# Patient Record
Sex: FEMALE | Race: WHITE | Employment: OTHER | URBAN - METROPOLITAN AREA
[De-identification: names, ages, dates, MRNs, and addresses within clinical notes are randomized per-mention and may not be internally consistent; named-entity substitution may affect disease eponyms.]

---

## 2017-09-19 ENCOUNTER — TELEPHONE (OUTPATIENT)
Dept: INTERNAL MEDICINE CLINIC | Age: 62
End: 2017-09-19

## 2017-09-20 PROBLEM — M85.80 OSTEOPENIA: Status: ACTIVE | Noted: 2017-09-20

## 2017-09-20 PROBLEM — J30.9 ALLERGIC RHINITIS: Status: ACTIVE | Noted: 2017-09-20

## 2017-09-21 ENCOUNTER — OFFICE VISIT (OUTPATIENT)
Dept: INTERNAL MEDICINE CLINIC | Age: 62
End: 2017-09-21

## 2017-09-21 VITALS
HEART RATE: 82 BPM | BODY MASS INDEX: 23.82 KG/M2 | SYSTOLIC BLOOD PRESSURE: 130 MMHG | HEIGHT: 65 IN | DIASTOLIC BLOOD PRESSURE: 72 MMHG | WEIGHT: 143 LBS

## 2017-09-21 DIAGNOSIS — N39.0 URINARY TRACT INFECTION WITHOUT HEMATURIA, SITE UNSPECIFIED: Primary | ICD-10-CM

## 2017-09-21 LAB
BACTERIA UA POCT, BACTPOCT: ABNORMAL
BILIRUB UR QL STRIP: NEGATIVE
CASTS UA POCT: ABNORMAL
CLUE CELLS, CLUEPOCT: NEGATIVE
CRYSTALS UA POCT, CRYSPOCT: NEGATIVE
EPITHELIAL CELLS POCT, EPITHPOCT: ABNORMAL
GLUCOSE UR-MCNC: NEGATIVE MG/DL
KETONES P FAST UR STRIP-MCNC: NEGATIVE MG/DL
MUCUS UA POCT, MUCPOCT: ABNORMAL
PH UR STRIP: 6.5 [PH] (ref 5–7)
PROTEIN,URINE POC: ABNORMAL MG/DL
RBC UA POCT, RBCPOCT: ABNORMAL
SP GR UR STRIP: 1.01 (ref 1.01–1.02)
TRICH UA POCT, TRICHPOC: NEGATIVE
UA UROBILINOGEN AMB POC: NORMAL (ref 0.2–1)
URINALYSIS CLARITY POC: ABNORMAL
URINALYSIS COLOR POC: ABNORMAL
URINE BLOOD POC: ABNORMAL
URINE LEUKOCYTES POC: ABNORMAL
URINE NITRITES POC: POSITIVE
WBC UA POCT, WBCPOCT: ABNORMAL
YEAST UA POCT, YEASTPOC: NEGATIVE

## 2017-09-21 RX ORDER — CIPROFLOXACIN 250 MG/1
250 TABLET, FILM COATED ORAL 2 TIMES DAILY
Qty: 14 TAB | Refills: 0 | Status: SHIPPED | OUTPATIENT
Start: 2017-09-21 | End: 2017-09-28

## 2017-09-21 RX ORDER — BISMUTH SUBSALICYLATE 262 MG
1 TABLET,CHEWABLE ORAL DAILY
COMMUNITY
End: 2022-10-06

## 2017-09-21 NOTE — PATIENT INSTRUCTIONS
Urinary Tract Infection in Pregnancy: Care Instructions  Your Care Instructions    A urinary tract infection, or UTI, is an infection of the bladder and other urinary structures. Most UTIs occur in the bladder. They often cause pain or burning when you urinate. UTI is the most common bacterial infection in pregnancy. If untreated, a UTI could lead to problems such as a kidney infection or  labor. Most UTIs can be cured with antibiotics. Your doctor will prescribe an antibiotic that is safe during pregnancy. Be sure to finish your medicine so that the infection does not spread to your kidneys. Follow-up care is a key part of your treatment and safety. Be sure to make and go to all appointments, and call your doctor if you are having problems. It's also a good idea to know your test results and keep a list of the medicines you take. How can you care for yourself at home? · Take your antibiotics as directed. Do not stop taking them just because you feel better. You need to take the full course of antibiotics. · Drink extra water and other fluids for the next day or two. This will help wash out the bacteria causing the infection. If you have kidney, heart, or liver disease and have to limit fluids, talk with your doctor before you increase the amount of fluids you drink. · Do not drink alcohol. · Urinate often. Try to empty your bladder each time. Preventing UTIs  · Drink plenty of fluids, enough so that your urine is light yellow or clear like water. This helps you urinate often, which clears bacteria from your system. If you have kidney, heart, or liver disease and have to limit fluids, talk with your doctor before you increase the amount of fluids you drink. · Urinate when you first have the urge. · Urinate right after you have sex. This is the best way for women to avoid UTIs. · When going to the bathroom, wipe from front to back to keep bacteria from entering the vagina or urethra.   When should you call for help? Call your doctor now or seek immediate medical care if:  · Symptoms such as fever, chills, nausea, or vomiting get worse or appear for the first time. · You have new pain in your back just below your rib cage. This is called flank pain. · There is new blood or pus in your urine. · You have any problems with your antibiotic medicine. Watch closely for changes in your health, and be sure to contact your doctor if:  · You are not getting better after 1 day (24 hours). · You have new symptoms, such as blood in your urine. Where can you learn more? Go to http://joslyn-josy.info/. Enter M982 in the search box to learn more about \"Urinary Tract Infection in Pregnancy: Care Instructions. \"  Current as of: March 16, 2017  Content Version: 11.3  © 9432-6311 [employer]. Care instructions adapted under license by [employer] (which disclaims liability or warranty for this information). If you have questions about a medical condition or this instruction, always ask your healthcare professional. [address] any warranty or liability for your use of this information.

## 2017-09-21 NOTE — PROGRESS NOTES
Subjective: This note will not be viewable in [address] The patient presents to the office with complaints of a possible UTI. Complains of odor, frequency and urgency ongoing for 7 days. Denies hematuria, abdominal pain or flank pain. No fever, chills, nausea or vomiting. Past Medical History:   Diagnosis Date    Allergic rhinitis 9/20/2017    Osteopenia 9/20/2017     History reviewed. No pertinent surgical history. No Known Allergies  Current Outpatient Prescriptions   Medication Sig Dispense Refill    multivitamin (ONE A DAY) tablet Take 1 Tab by mouth daily. Social History     Social History    Marital status:      Spouse name: N/A    Number of children: N/A    Years of education: N/A     Social History Main Topics    Smoking status: Never Smoker    Smokeless tobacco: Never Used    Alcohol use No    Drug use: No    Sexual activity: Not Asked     Other Topics Concern    None     Social History Narrative     Family History   Problem Relation Age of Onset    Elevated Lipids Mother     Cancer Mother      breast    Hypertension Father     Diabetes Father     Macular Degen Father     Heart Disease Father        Review of Systems:  GEN: no fever,chills or sweats  CV: no PND, orthopnea, or chest pain  Resp: no dyspnea on exertion, no cough  Abd: no nausea, vomiting or diarrhea or abdominal pain  Back: denies flank pain  : positive for dysuria, frequency and urgency  Neurological ROS: no TIA or stroke symptoms  ROS otherwise negative      Objective:     Visit Vitals    /72 (BP 1 Location: Right arm, BP Patient Position: Sitting)    Pulse 82    Ht 5' 5\" (1.651 m)    Wt 143 lb (64.9 kg)    BMI 23.8 kg/m2     Body mass index is 23.8 kg/(m^2). General:   alert, cooperative and no distress   Skin: No rash appreciated   Neck: Supple   Heart: RRR without murmur   Lungs: Clear to auscultation bilaterally   Abdomen: Soft, nontender.   Normal bowel sounds   Back: No CVAT present Neuro: No focal deficits appreciated     Physical exam otherwise negative         Assessment/Plan:     Diagnoses and all orders for this visit:    Urinary tract infection without hematuria, site unspecified  -     AMB POC URINALYSIS DIP STICK AUTO W/ MICRO   -     CULTURE, URINE        Other instructions:   Await results of urine culture    Antibiotic held pending results of urine studies    Increase po fluids    Follow-up Disposition:  Return for As previously scheduled.     Floridalma Cano MD I was present during the key portion of the procedure.

## 2017-09-21 NOTE — MR AVS SNAPSHOT
Visit Information Date & Time Provider Department Dept. Phone Encounter #  
 9/21/2017  9:20 AM Santo Trejo MD [address] [id number] Follow-up Instructions Return for As previously scheduled. Follow-up and Disposition History Upcoming Health Maintenance Date Due Hepatitis C Screening 1955 DTaP/Tdap/Td series (1 - Tdap) 9/18/1975 PAP AKA CERVICAL CYTOLOGY 9/18/1975 BREAST CANCER SCRN MAMMOGRAM 9/18/2004 FOBT Q 1 YEAR AGE 50-75 9/18/2004 ZOSTER VACCINE AGE 60> 7/18/2014 INFLUENZA AGE 9 TO ADULT 8/1/2017 Allergies as of 9/21/2017  Review Complete On: 9/21/2017 By: Santo Trejo MD  
 No Known Allergies Current Immunizations  Never Reviewed No immunizations on file. Not reviewed this visit You Were Diagnosed With   
  
 Codes Comments Urinary tract infection without hematuria, site unspecified    -  Primary ICD-10-CM: N39.0 ICD-9-CM: 599.0 Vitals BP Pulse Height(growth percentile) Weight(growth percentile) BMI Smoking Status 130/72 (BP 1 Location: Right arm, BP Patient Position: Sitting) 82 5' 5\" (1.651 m) 143 lb (64.9 kg) 23.8 kg/m2 Never Smoker BMI and BSA Data Body Mass Index Body Surface Area  
 23.8 kg/m 2 1.73 m 2 Your Updated Medication List  
  
   
This list is accurate as of: 9/21/17  9:48 AM.  Always use your most recent med list.  
  
  
  
  
 multivitamin tablet Commonly known as:  ONE A DAY Take 1 Tab by mouth daily. We Performed the Following AMB POC URINALYSIS DIP STICK AUTO W/ MICRO  [34179 CPT(R)] CULTURE, URINE [id number] CPT(R)] Follow-up Instructions Return for As previously scheduled. Patient Instructions Urinary Tract Infection in Pregnancy: Care Instructions Your Care Instructions A urinary tract infection, or UTI, is an infection of the bladder and other urinary structures. Most UTIs occur in the bladder. They often cause pain or burning when you urinate. UTI is the most common bacterial infection in pregnancy. If untreated, a UTI could lead to problems such as a kidney infection or  labor. Most UTIs can be cured with antibiotics. Your doctor will prescribe an antibiotic that is safe during pregnancy. Be sure to finish your medicine so that the infection does not spread to your kidneys. Follow-up care is a key part of your treatment and safety. Be sure to make and go to all appointments, and call your doctor if you are having problems. It's also a good idea to know your test results and keep a list of the medicines you take. How can you care for yourself at home? · Take your antibiotics as directed. Do not stop taking them just because you feel better. You need to take the full course of antibiotics. · Drink extra water and other fluids for the next day or two. This will help wash out the bacteria causing the infection. If you have kidney, heart, or liver disease and have to limit fluids, talk with your doctor before you increase the amount of fluids you drink. · Do not drink alcohol. · Urinate often. Try to empty your bladder each time. Preventing UTIs · Drink plenty of fluids, enough so that your urine is light yellow or clear like water. This helps you urinate often, which clears bacteria from your system. If you have kidney, heart, or liver disease and have to limit fluids, talk with your doctor before you increase the amount of fluids you drink. · Urinate when you first have the urge. · Urinate right after you have sex. This is the best way for women to avoid UTIs. · When going to the bathroom, wipe from front to back to keep bacteria from entering the vagina or urethra. When should you call for help? Call your doctor now or seek immediate medical care if: · Symptoms such as fever, chills, nausea, or vomiting get worse or appear for the first time. · You have new pain in your back just below your rib cage. This is called flank pain. · There is new blood or pus in your urine. · You have any problems with your antibiotic medicine. Watch closely for changes in your health, and be sure to contact your doctor if: 
· You are not getting better after 1 day (24 hours). · You have new symptoms, such as blood in your urine. Where can you learn more? Go to http://joslyn-josy.info/. Enter M982 in the search box to learn more about \"Urinary Tract Infection in Pregnancy: Care Instructions. \" Current as of: March 16, 2017 Content Version: 11.3 © 3305-6899 [employer]. Care instructions adapted under license by [employer] (which disclaims liability or warranty for this information). If you have questions about a medical condition or this instruction, always ask your healthcare professional. [address] any warranty or liability for your use of this information. Patient Instructions History Introducing [facility] & HEALTH SERVICES! Al[employer] Perry introduces [employer]oyo patient portal. Now you can access parts of your medical record, email your doctor's office, and request medication refills online. 1. In your internet browser, go to [email or web address] 2. Click on the First Time User? Click Here link in the Sign In box. You will see the New Member Sign Up page. 3. Enter your Yoyo Access Code exactly as it appears below. You will not need to use this code after youve completed the sign-up process. If you do not sign up before the expiration date, you must request a new code. · Yoyo Access Code: [email or web address] Expires: 12/20/2017  9:25 AM 
 
4. Enter the last four digits of your Social Security Number (xxxx) and Date of Birth (mm/dd/yyyy) as indicated and click Submit. You will be taken to the next sign-up page. 5. Create a Trak ID. This will be your Trak login ID and cannot be changed, so think of one that is secure and easy to remember. 6. Create a Trak password. You can change your password at any time. 7. Enter your Password Reset Question and Answer. This can be used at a later time if you forget your password. 8. Enter your e-mail address. You will receive e-mail notification when new information is available in [address] 9. Click Sign Up. You can now view and download portions of your medical record. 10. Click the Download Summary menu link to download a portable copy of your medical information. If you have questions, please visit the Frequently Asked Questions section of the Trak website. Remember, Trak is NOT to be used for urgent needs. For medical emergencies, dial 911. Now available from your iPhone and Android! Please provide this summary of care documentation to your next provider. Your primary care clinician is listed as GABE Huizar. If you have any questions after today's visit, please call [phone].

## 2017-09-21 NOTE — PROGRESS NOTES
Vinay Whaley is a 60 y.o. female presenting for Urinary Odor  . 1. Have you been to the ER, urgent care clinic since your last visit? Hospitalized since your last visit? No    2. Have you seen or consulted any other health care providers outside of the [address] [place] [address] since your last visit? Include any pap smears or colon screening. No    No flowsheet data found. Abuse Screening Questionnaire 9/21/2017   Do you ever feel afraid of your partner? N   Are you in a relationship with someone who physically or mentally threatens you? N   Is it safe for you to go home? Y       PHQ over the last two weeks 9/21/2017   Little interest or pleasure in doing things Not at all   Feeling down, depressed or hopeless Not at all   Total Score PHQ 2 0       There are no discontinued medications.

## 2017-09-23 LAB — BACTERIA UR CULT: ABNORMAL

## 2017-12-14 ENCOUNTER — OFFICE VISIT (OUTPATIENT)
Dept: INTERNAL MEDICINE CLINIC | Age: 62
End: 2017-12-14

## 2017-12-14 VITALS
OXYGEN SATURATION: 99 % | WEIGHT: 144 LBS | HEART RATE: 94 BPM | TEMPERATURE: 97.7 F | HEIGHT: 65 IN | DIASTOLIC BLOOD PRESSURE: 70 MMHG | BODY MASS INDEX: 23.99 KG/M2 | SYSTOLIC BLOOD PRESSURE: 122 MMHG

## 2017-12-14 DIAGNOSIS — J01.00 ACUTE MAXILLARY SINUSITIS, RECURRENCE NOT SPECIFIED: Primary | ICD-10-CM

## 2017-12-14 RX ORDER — MULTIVITAMIN
1 TABLET ORAL DAILY
COMMUNITY
End: 2022-10-06

## 2017-12-14 RX ORDER — ASCORBIC ACID 500 MG
1000 TABLET ORAL DAILY
COMMUNITY
End: 2022-08-18 | Stop reason: CLARIF

## 2017-12-14 RX ORDER — CEFUROXIME AXETIL 250 MG/1
250 TABLET ORAL 2 TIMES DAILY
Qty: 20 TAB | Refills: 0 | Status: SHIPPED | OUTPATIENT
Start: 2017-12-14 | End: 2018-01-15 | Stop reason: ALTCHOICE

## 2017-12-14 NOTE — PROGRESS NOTES
This note will not be viewable in [address] Carolyn Colón is a 60 y.o. female and presents with Sinus Infection  . Subjective:  Patient presents to the office today with 2 weeks worth of an upper respiratory infection with development of sinus congestion maxillary discomfort and purulent sinus drainage she has been taking Mucinex-D without relief. There is been no fevers or chills she does have some postnasal drainage but this is only caused a minor cough there is been no neck stiffness or rash. Patient Active Problem List   Diagnosis Code    Allergic rhinitis J30.9    Osteopenia M85.80     History reviewed. No pertinent surgical history. No Known Allergies  Current Outpatient Prescriptions   Medication Sig Dispense Refill    calcium-cholecalciferol, D3, (CALTRATE 600+D) tablet Take 1 Tab by mouth daily.  cranberry extract 450 mg tab tablet Take 450 mg by mouth.  ascorbic acid, vitamin C, (VITAMIN C) 500 mg tablet Take 1,000 mg by mouth daily.  cefUROXime (CEFTIN) 250 mg tablet Take 1 Tab by mouth two (2) times a day. 20 Tab 0    multivitamin (ONE A DAY) tablet Take 1 Tab by mouth daily.        Social History     Social History    Marital status:      Spouse name: N/A    Number of children: N/A    Years of education: N/A     Social History Main Topics    Smoking status: Never Smoker    Smokeless tobacco: Never Used    Alcohol use No    Drug use: No    Sexual activity: Not Asked     Other Topics Concern    None     Social History Narrative     Family History   Problem Relation Age of Onset    Elevated Lipids Mother     Cancer Mother      breast    Hypertension Father     Diabetes Father     Macular Degen Father     Heart Disease Father        Review of Systems  Constitutional: negative for fevers, chills, anorexia and weight loss  Eyes:   negative for visual disturbance and irritation  ENT:   Positive for sinus congestion, maxillary discomfort, purulent psotnasal draingage and throat irritation  Respiratory:  negative for cough, hemoptysis, dyspnea,wheezing  CV:   negative for chest pain, palpitations, lower extremity edema  GI:   negative for nausea, vomiting, diarrhea, abdominal pain,melena  Integumentary: negative for rash and pruritus  Neurological:  negative for headaches, dizziness, vertigo, memory problems and gait       Objective:  Visit Vitals    /70 (BP 1 Location: Right arm, BP Patient Position: Sitting)    Pulse 94    Temp 97.7 °F (36.5 °C)    Ht 5' 5\" (1.651 m)    Wt 144 lb (65.3 kg)    SpO2 99%    BMI 23.96 kg/m2     Body mass index is 23.96 kg/(m^2). Physical Exam:   General appearance - alert, well appearing, and in no distress  Mental status - alert, oriented to person, place, and time  EYE-KIMBER, EOMI, sclera clear. No lid swelling or purulent drainage  ENT- TM's clear without A/F level. Pharynx slightly erythematous with drainage noted  Nose - normal and patent, no erythema,  Neck - supple, no significant adenopathy   Chest - clear to auscultation, no wheezes, rales or rhonchi, symmetric air entry   Heart - normal rate, regular rhythm, normal S1, S2, no murmurs, rubs, clicks or gallops   Skin-No rash appreciated  Neuro -alert, oriented, normal speech, no focal findings. Assessment/Plan:  Diagnoses and all orders for this visit:    Acute maxillary sinusitis, recurrence not specified  -     cefUROXime (CEFTIN) 250 mg tablet; Take 1 Tab by mouth two (2) times a day., Normal, Disp-20 Tab, R-0        Other Instructions:  Mucinex as directed    Increase po fluids    Follow-up Disposition:  Return if symptoms worsen or fail to improve. I have reviewed with the patient details of the assessment and plan and all questions were answered. Relevent patient education was performed. An After Visit Summary was printed and given to the patient.     Nika Nugent MD

## 2017-12-14 NOTE — PROGRESS NOTES
Amy Andres is a 60 y.o. female presenting for Sinus Infection  . 1. Have you been to the ER, urgent care clinic since your last visit? Hospitalized since your last visit? No    2. Have you seen or consulted any other health care providers outside of the [address] [place] [address] since your last visit? Include any pap smears or colon screening. No    No flowsheet data found. Abuse Screening Questionnaire 9/21/2017   Do you ever feel afraid of your partner? N   Are you in a relationship with someone who physically or mentally threatens you? N   Is it safe for you to go home? Y       PHQ over the last two weeks 9/21/2017   Little interest or pleasure in doing things Not at all   Feeling down, depressed or hopeless Not at all   Total Score PHQ 2 0       There are no discontinued medications.

## 2017-12-14 NOTE — MR AVS SNAPSHOT
Visit Information Date & Time Provider Department Dept. Phone Encounter #  
 12/14/2017  3:10 PM Nanette Hilton MD [facility] [id number] Follow-up Instructions Return if symptoms worsen or fail to improve. Upcoming Health Maintenance Date Due Hepatitis C Screening 1955 DTaP/Tdap/Td series (1 - Tdap) 9/18/1975 PAP AKA CERVICAL CYTOLOGY 9/18/1975 FOBT Q 1 YEAR AGE 50-75 9/18/2004 ZOSTER VACCINE AGE 60> 7/18/2014 Influenza Age 5 to Adult 8/1/2017 Allergies as of 12/14/2017  Review Complete On: 12/14/2017 By: Nanette Hilton MD  
 No Known Allergies Current Immunizations  Never Reviewed No immunizations on file. Not reviewed this visit You Were Diagnosed With   
  
 Codes Comments Acute maxillary sinusitis, recurrence not specified    -  Primary ICD-10-CM: J01.00 ICD-9-CM: 461.0 Vitals BP Pulse Temp Height(growth percentile) Weight(growth percentile) SpO2  
 122/70 (BP 1 Location: Right arm, BP Patient Position: Sitting) 94 97.7 °F (36.5 °C) 5' 5\" (1.651 m) 144 lb (65.3 kg) 99% BMI Smoking Status 23.96 kg/m2 Never Smoker BMI and BSA Data Body Mass Index Body Surface Area  
 23.96 kg/m 2 1.73 m 2 Preferred Pharmacy Pharmacy Name Phone [id number] [address] [phone] Your Updated Medication List  
  
   
This list is accurate as of: 12/14/17  3:26 PM.  Always use your most recent med list.  
  
  
  
  
 calcium-cholecalciferol (D3) tablet Commonly known as:  CALTRATE 600+D Take 1 Tab by mouth daily. cefUROXime 250 mg tablet Commonly known as:  CEFTIN Take 1 Tab by mouth two (2) times a day. cranberry extract 450 mg Tab tablet Take 450 mg by mouth.  
  
 multivitamin tablet Commonly known as:  ONE A DAY Take 1 Tab by mouth daily. VITAMIN C 500 mg tablet Generic drug:  ascorbic acid (vitamin C) Take 1,000 mg by mouth daily. Prescriptions Sent to Pharmacy Refills  
 cefUROXime (CEFTIN) 250 mg tablet 0 Sig: Take 1 Tab by mouth two (2) times a day. Class: Normal  
 Pharmacy: [facility] [id number] [facility] [address]  #: [phone] Route: Oral  
  
Follow-up Instructions Return if symptoms worsen or fail to improve. Introducing [facility] & HEALTH SERVICES! [employer] introduces [employer] patient portal. Now you can access parts of your medical record, email your doctor's office, and request medication refills online. 1. In your internet browser, go to [email or web address] 2. Click on the First Time User? Click Here link in the Sign In box. You will see the New Member Sign Up page. 3. Enter your [employer] Access Code exactly as it appears below. You will not need to use this code after youve completed the sign-up process. If you do not sign up before the expiration date, you must request a new code. · [employer] Access Code: [email or web address] Expires: 12/20/2017  8:25 AM 
 
4. Enter the last four digits of your Social Security Number (xxxx) and Date of Birth (mm/dd/yyyy) as indicated and click Submit. You will be taken to the next sign-up page. 5. Create a [employer] ID. This will be your [employer] login ID and cannot be changed, so think of one that is secure and easy to remember. 6. Create a [employer] password. You can change your password at any time. 7. Enter your Password Reset Question and Answer. This can be used at a later time if you forget your password. 8. Enter your e-mail address. You will receive e-mail notification when new information is available in [address] 9. Click Sign Up. You can now view and download portions of your medical record.  
10. Click the Download Summary menu link to download a portable copy of your medical information. If you have questions, please visit the Frequently Asked Questions section of the [employer] website. Remember, [employer]uxeo is NOT to be used for urgent needs. For medical emergencies, dial 911. Now available from your iPhone and Android! Please provide this summary of care documentation to your next provider. Your primary care clinician is listed as GABE Huizar. If you have any questions after today's visit, please call [phone].

## 2018-01-15 ENCOUNTER — OFFICE VISIT (OUTPATIENT)
Dept: INTERNAL MEDICINE CLINIC | Age: 63
End: 2018-01-15

## 2018-01-15 VITALS
HEIGHT: 65 IN | WEIGHT: 147.2 LBS | DIASTOLIC BLOOD PRESSURE: 72 MMHG | SYSTOLIC BLOOD PRESSURE: 122 MMHG | BODY MASS INDEX: 24.53 KG/M2 | TEMPERATURE: 100.2 F

## 2018-01-15 DIAGNOSIS — N39.0 URINARY TRACT INFECTION WITHOUT HEMATURIA, SITE UNSPECIFIED: Primary | ICD-10-CM

## 2018-01-15 RX ORDER — LEVOFLOXACIN 250 MG/1
250 TABLET ORAL DAILY
Qty: 5 TAB | Refills: 0 | Status: SHIPPED | OUTPATIENT
Start: 2018-01-15 | End: 2018-01-20

## 2018-01-15 NOTE — PROGRESS NOTES
Heriberto Rowe is a 60 y.o. female presenting for Bladder Infection  . 1. Have you been to the ER, urgent care clinic since your last visit? Hospitalized since your last visit? Yes When: 1-12-18 [facility]: [facility] Reason for visit: UTI- given Bactrim    2. Have you seen or consulted any other health care providers outside of the [address] [place] [address] since your last visit? Include any pap smears or colon screening. No    No flowsheet data found. Abuse Screening Questionnaire 9/21/2017   Do you ever feel afraid of your partner? N   Are you in a relationship with someone who physically or mentally threatens you? N   Is it safe for you to go home? Y       PHQ over the last two weeks 9/21/2017   Little interest or pleasure in doing things Not at all   Feeling down, depressed or hopeless Not at all   Total Score PHQ 2 0       There are no discontinued medications.

## 2018-01-15 NOTE — MR AVS SNAPSHOT
Visit Information Date & Time Provider Department Dept. Phone Encounter #  
 1/15/2018  8:50 AM [facility] [id number] Follow-up Instructions Return if symptoms worsen or fail to improve. Upcoming Health Maintenance Date Due Hepatitis C Screening 1955 DTaP/Tdap/Td series (1 - Tdap) 9/18/1975 PAP AKA CERVICAL CYTOLOGY 9/18/1975 BREAST CANCER SCRN MAMMOGRAM 9/18/2004 FOBT Q 1 YEAR AGE 50-75 9/18/2004 ZOSTER VACCINE AGE 60> 7/18/2014 Influenza Age 5 to Adult 8/1/2017 Allergies as of 1/15/2018  Review Complete On: 1/15/2018 By: Ann Matos MD  
 No Known Allergies Current Immunizations  Never Reviewed No immunizations on file. Not reviewed this visit You Were Diagnosed With   
  
 Codes Comments Urinary tract infection without hematuria, site unspecified    -  Primary ICD-10-CM: N39.0 ICD-9-CM: 599.0 Vitals BP Temp Height(growth percentile) Weight(growth percentile) BMI Smoking Status 122/72 (BP 1 Location: Left arm, BP Patient Position: Sitting) 100.2 °F (37.9 °C) (Oral) 5' 5\" (1.651 m) 147 lb 3.2 oz (66.8 kg) 24.5 kg/m2 Never Smoker BMI and BSA Data Body Mass Index Body Surface Area 24.5 kg/m 2 1.75 m 2 Preferred Pharmacy Pharmacy Name Phone [id number] [address] [phone] Your Updated Medication List  
  
   
This list is accurate as of: 1/15/18  9:16 AM.  Always use your most recent med list.  
  
  
  
  
 calcium-cholecalciferol (D3) tablet Commonly known as:  CALTRATE 600+D Take 1 Tab by mouth daily. cranberry extract 450 mg Tab tablet Take 450 mg by mouth.  
  
 levoFLOXacin 250 mg tablet Commonly known as:  Ave Patient Take 1 Tab by mouth daily for 5 days. multivitamin tablet Commonly known as:  ONE A DAY  
 Take 1 Tab by mouth daily. VITAMIN C 500 mg tablet Generic drug:  ascorbic acid (vitamin C) Take 1,000 mg by mouth daily. Prescriptions Sent to Pharmacy Refills  
 levoFLOXacin (LEVAQUIN) 250 mg tablet 0 Sig: Take 1 Tab by mouth daily for 5 days. Class: Normal  
 [facility] [address]  #: [phone] Route: Oral  
  
Follow-up Instructions Return if symptoms worsen or fail to improve. Patient Instructions Urinary Tract Infection in Women: Care Instructions Your Care Instructions A urinary tract infection, or UTI, is a general term for an infection anywhere between the kidneys and the urethra (where urine comes out). Most UTIs are bladder infections. They often cause pain or burning when you urinate. UTIs are caused by bacteria and can be cured with antibiotics. Be sure to complete your treatment so that the infection goes away. Follow-up care is a key part of your treatment and safety. Be sure to make and go to all appointments, and call your doctor if you are having problems. It's also a good idea to know your test results and keep a list of the medicines you take. How can you care for yourself at home? · Take your antibiotics as directed. Do not stop taking them just because you feel better. You need to take the full course of antibiotics. · Drink extra water and other fluids for the next day or two. This may help wash out the bacteria that are causing the infection. (If you have kidney, heart, or liver disease and have to limit fluids, talk with your doctor before you increase your fluid intake.) · Avoid drinks that are carbonated or have caffeine. They can irritate the bladder. · Urinate often. Try to empty your bladder each time.  
· To relieve pain, take a hot bath or lay a heating pad set on low over your lower belly or genital area. Never go to sleep with a heating pad in place. To prevent UTIs · Drink plenty of water each day. This helps you urinate often, which clears bacteria from your system. (If you have kidney, heart, or liver disease and have to limit fluids, talk with your doctor before you increase your fluid intake.) · Urinate when you need to. · Urinate right after you have sex. · Change sanitary pads often. · Avoid douches, bubble baths, feminine hygiene sprays, and other feminine hygiene products that have deodorants. · After going to the bathroom, wipe from front to back. When should you call for help? Call your doctor now or seek immediate medical care if: 
? · Symptoms such as fever, chills, nausea, or vomiting get worse or appear for the first time. ? · You have new pain in your back just below your rib cage. This is called flank pain. ? · There is new blood or pus in your urine. ? · You have any problems with your antibiotic medicine. ? Watch closely for changes in your health, and be sure to contact your doctor if: 
? · You are not getting better after taking an antibiotic for 2 days. ? · Your symptoms go away but then come back. Where can you learn more? Go to http://joslyn-josy.info/. Enter J261 in the search box to learn more about \"Urinary Tract Infection in Women: Care Instructions. \" Current as of: May 12, 2017 Content Version: 11.4 © 7914-8433 [employer]. Care instructions adapted under license by [employer] (which disclaims liability or warranty for this information). If you have questions about a medical condition or this instruction, always ask your healthcare professional. [place] [address] any warranty or liability for your use of this information. Introducing [facility]!    
 [address] introduces [employer] patient portal. Now you can access parts of your medical record, email your doctor's office, and request medication refills online. 1. In your internet browser, go to [email or web address] 2. Click on the First Time User? Click Here link in the Sign In box. You will see the New Member Sign Up page. 3. Enter your [employer] Access Code exactly as it appears below. You will not need to use this code after youve completed the sign-up process. If you do not sign up before the expiration date, you must request a new code. · [employer] Access Code: [email or web address] Expires: 4/15/2018  8:51 AM 
 
4. Enter the last four digits of your Social Security Number (xxxx) and Date of Birth (mm/dd/yyyy) as indicated and click Submit. You will be taken to the next sign-up page. 5. Create a [employer] ID. This will be your [employer] login ID and cannot be changed, so think of one that is secure and easy to remember. 6. Create a [employer] password. You can change your password at any time. 7. Enter your Password Reset Question and Answer. This can be used at a later time if you forget your password. 8. Enter your e-mail address. You will receive e-mail notification when new information is available in [address] 9. Click Sign Up. You can now view and download portions of your medical record. 10. Click the Download Summary menu link to download a portable copy of your medical information. If you have questions, please visit the Frequently Asked Questions section of the [employer] website. Remember, [employer] is NOT to be used for urgent needs. For medical emergencies, dial 911. Now available from your iPhone and Android! Please provide this summary of care documentation to your next provider. Your primary care clinician is listed as GABE Huizar. If you have any questions after today's visit, please call [phone].

## 2018-01-15 NOTE — PATIENT INSTRUCTIONS

## 2018-05-22 ENCOUNTER — OFFICE VISIT (OUTPATIENT)
Dept: INTERNAL MEDICINE CLINIC | Age: 63
End: 2018-05-22

## 2018-05-22 VITALS
HEIGHT: 65 IN | DIASTOLIC BLOOD PRESSURE: 78 MMHG | SYSTOLIC BLOOD PRESSURE: 132 MMHG | TEMPERATURE: 99.1 F | WEIGHT: 149 LBS | HEART RATE: 104 BPM | OXYGEN SATURATION: 95 % | BODY MASS INDEX: 24.83 KG/M2

## 2018-05-22 DIAGNOSIS — J01.00 ACUTE MAXILLARY SINUSITIS, RECURRENCE NOT SPECIFIED: Primary | ICD-10-CM

## 2018-05-22 RX ORDER — CEFUROXIME AXETIL 250 MG/1
250 TABLET ORAL 2 TIMES DAILY
Qty: 20 TAB | Refills: 0 | Status: SHIPPED | OUTPATIENT
Start: 2018-05-22 | End: 2019-02-27 | Stop reason: ALTCHOICE

## 2018-05-22 NOTE — PATIENT INSTRUCTIONS
Sinusitis: Care Instructions  Your Care Instructions    Sinusitis is an infection of the lining of the sinus cavities in your head. Sinusitis often follows a cold. It causes pain and pressure in your head and face. In most cases, sinusitis gets better on its own in 1 to 2 weeks. But some mild symptoms may last for several weeks. Sometimes antibiotics are needed. Follow-up care is a key part of your treatment and safety. Be sure to make and go to all appointments, and call your doctor if you are having problems. It's also a good idea to know your test results and keep a list of the medicines you take. How can you care for yourself at home? · Take an over-the-counter pain medicine, such as acetaminophen (Tylenol), ibuprofen (Advil, Motrin), or naproxen (Aleve). Read and follow all instructions on the label. · If the doctor prescribed antibiotics, take them as directed. Do not stop taking them just because you feel better. You need to take the full course of antibiotics. · Be careful when taking over-the-counter cold or flu medicines and Tylenol at the same time. Many of these medicines have acetaminophen, which is Tylenol. Read the labels to make sure that you are not taking more than the recommended dose. Too much acetaminophen (Tylenol) can be harmful. · Breathe warm, moist air from a steamy shower, a hot bath, or a sink filled with hot water. Avoid cold, dry air. Using a humidifier in your home may help. Follow the directions for cleaning the machine. · Use saline (saltwater) nasal washes to help keep your nasal passages open and wash out mucus and bacteria. You can buy saline nose drops at a grocery store or drugstore. Or you can make your own at home by adding 1 teaspoon of salt and 1 teaspoon of baking soda to 2 cups of distilled water. If you make your own, fill a bulb syringe with the solution, insert the tip into your nostril, and squeeze gently. Gera Clonts your nose.   · Put a hot, wet towel or a warm gel pack on your face 3 or 4 times a day for 5 to 10 minutes each time. · Try a decongestant nasal spray like oxymetazoline (Afrin). Do not use it for more than 3 days in a row. Using it for more than 3 days can make your congestion worse. When should you call for help? Call your doctor now or seek immediate medical care if:  ? · You have new or worse swelling or redness in your face or around your eyes. ? · You have a new or higher fever. ? Watch closely for changes in your health, and be sure to contact your doctor if:  ? · You have new or worse facial pain. ? · The mucus from your nose becomes thicker (like pus) or has new blood in it. ? · You are not getting better as expected. Where can you learn more? Go to http://joslyn-josy.info/. Enter B787 in the search box to learn more about \"Sinusitis: Care Instructions. \"  Current as of: May 12, 2017  Content Version: 11.4  © 7518-4328 [facility]. Care instructions adapted under license by [employer] (which disclaims liability or warranty for this information). If you have questions about a medical condition or this instruction, always ask your healthcare professional. [address] any warranty or liability for your use of this information.

## 2018-05-22 NOTE — PROGRESS NOTES
Ellie Heck is a 60 y.o. female presenting for Cold Symptoms  . 1. Have you been to the ER, urgent care clinic since your last visit? Hospitalized since your last visit? No    2. Have you seen or consulted any other health care providers outside of the [address] [place] [address] since your last visit? Include any pap smears or colon screening. No    No flowsheet data found. Abuse Screening Questionnaire 9/21/2017   Do you ever feel afraid of your partner? N   Are you in a relationship with someone who physically or mentally threatens you? N   Is it safe for you to go home? Y       PHQ over the last two weeks 5/22/2018   Little interest or pleasure in doing things Not at all   Feeling down, depressed or hopeless Not at all   Total Score PHQ 2 0       There are no discontinued medications.

## 2018-05-22 NOTE — PROGRESS NOTES
This note will not be viewable in [address] Erasmo Angel is a 60 y.o. female and presents with Cold Symptoms  . Subjective:  Mrs. Anali Rosales presents to the office today with complaints of an upper respiratory infection which began approximately a month ago with sinus congestion drainage without cough. Over the last 2 weeks she is started to develop some facial pressure but denies any severe headaches. She has been running low-grade fevers and having postnasal drainage with mild  irritation. She has had no significant cough. She is tried over-the-counter medication without relief she denies neck stiffness or rash. Patient Active Problem List   Diagnosis Code    Allergic rhinitis J30.9    Osteopenia M85.80     History reviewed. No pertinent surgical history. No Known Allergies  Current Outpatient Prescriptions   Medication Sig Dispense Refill    cefUROXime (CEFTIN) 250 mg tablet Take 1 Tab by mouth two (2) times a day. 20 Tab 0    calcium-cholecalciferol, D3, (CALTRATE 600+D) tablet Take 1 Tab by mouth daily.  cranberry extract 450 mg tab tablet Take 450 mg by mouth.  ascorbic acid, vitamin C, (VITAMIN C) 500 mg tablet Take 1,000 mg by mouth daily.  multivitamin (ONE A DAY) tablet Take 1 Tab by mouth daily.        Social History     Social History    Marital status:      Spouse name: N/A    Number of children: N/A    Years of education: N/A     Social History Main Topics    Smoking status: Never Smoker    Smokeless tobacco: Never Used    Alcohol use No    Drug use: No    Sexual activity: Not Asked     Other Topics Concern    None     Social History Narrative     Family History   Problem Relation Age of Onset    Elevated Lipids Mother     Cancer Mother      breast    Hypertension Father     Diabetes Father     Macular Degen Father     Heart Disease Father        Review of Systems  Constitutional: negative for fevers, chills, anorexia and weight loss  Eyes: negative for visual disturbance and irritation  ENT:   Positive for sinus congestion, maxillary discomfort, purulent psotnasal draingage and throat irritation  Respiratory:  negative for cough, hemoptysis, dyspnea,wheezing  CV:   negative for chest pain, palpitations, lower extremity edema  GI:   negative for nausea, vomiting, diarrhea, abdominal pain,melena  Integumentary: negative for rash and pruritus  Neurological:  negative for headaches, dizziness, vertigo, memory problems and gait       Objective:  Visit Vitals    /78 (BP 1 Location: Right arm, BP Patient Position: Sitting)    Pulse (!) 104    Temp 99.1 °F (37.3 °C)    Ht 5' 5\" (1.651 m)    Wt 149 lb (67.6 kg)    SpO2 95%    BMI 24.79 kg/m2     Body mass index is 24.79 kg/(m^2). Physical Exam:   General appearance - alert, well appearing, and in no distress  Mental status - alert, oriented to person, place, and time  EYE-KIMBER, EOMI, sclera clear. No lid swelling or purulent drainage  ENT- TM's clear without A/F level. Pharynx slightly erythematous with drainage noted  Nose - normal and patent, no erythema,  Neck - supple, no significant adenopathy   Chest - clear to auscultation, no wheezes, rales or rhonchi, symmetric air entry   Heart - normal rate, regular rhythm, normal S1, S2, no murmurs, rubs, clicks or gallops   Skin-No rash appreciated  Neuro -alert, oriented, normal speech, no focal findings. Assessment/Plan:  Diagnoses and all orders for this visit:    Acute maxillary sinusitis, recurrence not specified  -     cefUROXime (CEFTIN) 250 mg tablet; Take 1 Tab by mouth two (2) times a day., Normal, Disp-20 Tab, R-0        Other Instructions:  Mucinex as directed    Increase po fluids    Follow-up Disposition:  Return if symptoms worsen or fail to improve. I have reviewed with the patient details of the assessment and plan and all questions were answered. Relevent patient education was performed.     An After Visit Summary was printed and given to the patient.     Cherrie Dancer, MD

## 2018-05-22 NOTE — MR AVS SNAPSHOT
Alexys Burgos 
 
 
 [address] [id number] [phone] Patient: Vandana Ramirez MRN: [id number] Visit Information Date & Time Provider Department Dept. Phone Encounter #  
 5/22/2018  3:40 PM Abel Salazar MD [facility] [id number] Follow-up Instructions Return if symptoms worsen or fail to improve. Upcoming Health Maintenance Date Due Hepatitis C Screening 1955 DTaP/Tdap/Td series (1 - Tdap) 9/18/1975 PAP AKA CERVICAL CYTOLOGY 9/18/1975 BREAST CANCER SCRN MAMMOGRAM 9/18/2004 FOBT Q 1 YEAR AGE 50-75 9/18/2004 ZOSTER VACCINE AGE 60> 7/18/2014 Influenza Age 5 to Adult 8/1/2018 Allergies as of 5/22/2018  Review Complete On: 5/22/2018 By: Abel Salazar MD  
 No Known Allergies Current Immunizations  Never Reviewed No immunizations on file. Not reviewed this visit You Were Diagnosed With   
  
 Codes Comments Acute maxillary sinusitis, recurrence not specified    -  Primary ICD-10-CM: J01.00 ICD-9-CM: 461.0 Vitals BP Pulse Temp Height(growth percentile) Weight(growth percentile) SpO2  
 132/78 (BP 1 Location: Right arm, BP Patient Position: Sitting) (!) 104 99.1 °F (37.3 °C) 5' 5\" (1.651 m) 149 lb (67.6 kg) 95% BMI Smoking Status 24.79 kg/m2 Never Smoker BMI and BSA Data Body Mass Index Body Surface Area 24.79 kg/m 2 1.76 m 2 Preferred Pharmacy Pharmacy Name Phone [id number], [place] [facility] [address] [phone] Your Updated Medication List  
  
   
This list is accurate as of 5/22/18  4:03 PM.  Always use your most recent med list.  
  
  
  
  
 calcium-cholecalciferol (D3) tablet Commonly known as:  CALTRATE 600+D Take 1 Tab by mouth daily. cefUROXime 250 mg tablet Commonly known as:  CEFTIN Take 1 Tab by mouth two (2) times a day. cranberry extract 450 mg Tab tablet Take 450 mg by mouth.  
  
 multivitamin tablet Commonly known as:  ONE A DAY Take 1 Tab by mouth daily. VITAMIN C 500 mg tablet Generic drug:  ascorbic acid (vitamin C) Take 1,000 mg by mouth daily. Prescriptions Sent to Pharmacy Refills  
 cefUROXime (CEFTIN) 250 mg tablet 0 Sig: Take 1 Tab by mouth two (2) times a day. Class: Normal  
 Pharmacy: [facility]/pharmacy [address] [place] [address] [address]  #: [phone] Route: Oral  
  
Follow-up Instructions Return if symptoms worsen or fail to improve. Introducing [facility]! [facility] introduces [employer] patient portal. Now you can access parts of your medical record, email your doctor's office, and request medication refills online. 1. In your internet browser, go to [email or web address] 2. Click on the First Time User? Click Here link in the Sign In box. You will see the New Member Sign Up page. 3. Enter your [employer] Access Code exactly as it appears below. You will not need to use this code after youve completed the sign-up process. If you do not sign up before the expiration date, you must request a new code. · [employer] Access Code: [email or web address] Expires: 8/20/2018  4:03 PM 
 
4. Enter the last four digits of your Social Security Number (xxxx) and Date of Birth (mm/dd/yyyy) as indicated and click Submit. You will be taken to the next sign-up page. 5. Create a [employer] ID. This will be your [employer] login ID and cannot be changed, so think of one that is secure and easy to remember. 6. Create a [employer] password. You can change your password at any time. 7. Enter your Password Reset Question and Answer. This can be used at a later time if you forget your password. 8. Enter your e-mail address. You will receive e-mail notification when new information is available in [address] 9. Click Sign Up. You can now view and download portions of your medical record. 10. Click the Download Summary menu link to download a portable copy of your medical information. If you have questions, please visit the Frequently Asked Questions section of the [employer] website. Remember, [employer] is NOT to be used for urgent needs. For medical emergencies, dial 911. Now available from your iPhone and Android! Please provide this summary of care documentation to your next provider. Your primary care clinician is listed as GABE Palomino 21. If you have any questions after today's visit, please call [phone].

## 2019-02-27 ENCOUNTER — OFFICE VISIT (OUTPATIENT)
Dept: INTERNAL MEDICINE CLINIC | Age: 64
End: 2019-02-27

## 2019-02-27 VITALS
OXYGEN SATURATION: 97 % | DIASTOLIC BLOOD PRESSURE: 76 MMHG | TEMPERATURE: 98 F | WEIGHT: 154.2 LBS | HEIGHT: 65 IN | SYSTOLIC BLOOD PRESSURE: 118 MMHG | BODY MASS INDEX: 25.69 KG/M2 | HEART RATE: 94 BPM

## 2019-02-27 DIAGNOSIS — J01.00 ACUTE MAXILLARY SINUSITIS, RECURRENCE NOT SPECIFIED: Primary | ICD-10-CM

## 2019-02-27 RX ORDER — SULFAMETHOXAZOLE AND TRIMETHOPRIM 800; 160 MG/1; MG/1
1 TABLET ORAL 2 TIMES DAILY
Qty: 20 TAB | Refills: 0 | Status: SHIPPED | OUTPATIENT
Start: 2019-02-27 | End: 2019-03-09

## 2019-02-27 NOTE — PROGRESS NOTES
Narayan Rust is a 58 y.o. female presenting for Sinus Infection  . 1. Have you been to the ER, urgent care clinic since your last visit? Hospitalized since your last visit? No    2. Have you seen or consulted any other health care providers outside of the [address] [place] [address] since your last visit? Include any pap smears or colon screening. No    No flowsheet data found. Abuse Screening Questionnaire 9/21/2017   Do you ever feel afraid of your partner? N   Are you in a relationship with someone who physically or mentally threatens you? N   Is it safe for you to go home? Y       3 most recent PHQ Screens 5/22/2018   Little interest or pleasure in doing things Not at all   Feeling down, depressed, irritable, or hopeless Not at all   Total Score PHQ 2 0       There are no discontinued medications.

## 2019-02-27 NOTE — PATIENT INSTRUCTIONS
Sinusitis: Care Instructions  Your Care Instructions    Sinusitis is an infection of the lining of the sinus cavities in your head. Sinusitis often follows a cold. It causes pain and pressure in your head and face. In most cases, sinusitis gets better on its own in 1 to 2 weeks. But some mild symptoms may last for several weeks. Sometimes antibiotics are needed. Follow-up care is a key part of your treatment and safety. Be sure to make and go to all appointments, and call your doctor if you are having problems. It's also a good idea to know your test results and keep a list of the medicines you take. How can you care for yourself at home? · Take an over-the-counter pain medicine, such as acetaminophen (Tylenol), ibuprofen (Advil, Motrin), or naproxen (Aleve). Read and follow all instructions on the label. · If the doctor prescribed antibiotics, take them as directed. Do not stop taking them just because you feel better. You need to take the full course of antibiotics. · Be careful when taking over-the-counter cold or flu medicines and Tylenol at the same time. Many of these medicines have acetaminophen, which is Tylenol. Read the labels to make sure that you are not taking more than the recommended dose. Too much acetaminophen (Tylenol) can be harmful. · Breathe warm, moist air from a steamy shower, a hot bath, or a sink filled with hot water. Avoid cold, dry air. Using a humidifier in your home may help. Follow the directions for cleaning the machine. · Use saline (saltwater) nasal washes to help keep your nasal passages open and wash out mucus and bacteria. You can buy saline nose drops at a grocery store or drugstore. Or you can make your own at home by adding 1 teaspoon of salt and 1 teaspoon of baking soda to 2 cups of distilled water. If you make your own, fill a bulb syringe with the solution, insert the tip into your nostril, and squeeze gently. Magalie Stager your nose.   · Put a hot, wet towel or a warm gel pack on your face 3 or 4 times a day for 5 to 10 minutes each time. · Try a decongestant nasal spray like oxymetazoline (Afrin). Do not use it for more than 3 days in a row. Using it for more than 3 days can make your congestion worse. When should you call for help? Call your doctor now or seek immediate medical care if:    · You have new or worse swelling or redness in your face or around your eyes.     · You have a new or higher fever.    Watch closely for changes in your health, and be sure to contact your doctor if:    · You have new or worse facial pain.     · The mucus from your nose becomes thicker (like pus) or has new blood in it.     · You are not getting better as expected. Where can you learn more? Go to http://joslyn-josy.info/. Enter M971 in the search box to learn more about \"Sinusitis: Care Instructions. \"  Current as of: March 27, 2018  Content Version: 11.9  © 4040-7138 [employer]. Care instructions adapted under license by [employer] (which disclaims liability or warranty for this information). If you have questions about a medical condition or this instruction, always ask your healthcare professional. [place] [address] any warranty or liability for your use of this information. Learning About Cutting Calories  How do calories affect your weight? Food gives your body energy. Energy from the food you eat is measured in calories. This energy keeps your heart beating, your brain active, and your muscles working. Your body needs a certain number of calories each day. After your body uses the calories it needs, it stores extra calories as fat. To lose weight safely, you have to eat fewer calories while eating in a healthy way. How many calories do you need each day? The more active you are, the more calories you need. When you are less active, you need fewer calories.  How many calories you need each day also depends on several things, including your age and whether you are male or female. Here are some general guidelines for adults:  · Less active women and older adults need 1,600 to 2,000 calories each day. · Active women and less active men need 2,000 to 2,400 calories each day. · Active men need 2,400 to 3,000 calories each day. How can you cut calories and eat healthy meals? Whole grains, vegetables and fruits, and dried beans are good lower-calorie foods. They give you lots of nutrients and fiber. And they fill you up. Sweets, energy drinks, and soda pop are high in calories. They give you few nutrients and no fiber. Try to limit soda pop, fruit juice, and energy drinks. Drink water instead. Some fats can be part of a healthy diet. But cutting back on fats from highly processed foods like fast foods and many snack foods is a good way to lower the calories in your diet. Also, use smaller amounts of fats like butter, margarine, salad dressing, and mayonnaise. Add fresh garlic, lemon, or herbs to your meals to add flavor without adding fat. Meats and dairy products can be a big source of hidden fats. Try to choose lean or low-fat versions of these products. Fat-free cookies, candies, chips, and frozen treats can still be high in sugar and calories. Some fat-free foods have more calories than regular ones. Eat fat-free treats in moderation, as you would other foods. If your favorite foods are high in fat, salt, sugar, or calories, limit how often you eat them. Eat smaller servings, or look for healthy substitutes. Fill up on fruits, vegetables, and whole grains. Eating at home  · Use meat as a side dish instead of as the main part of your meal.  · Try main dishes that use whole wheat pasta, brown rice, dried beans, or vegetables. · Find ways to cook with little or no fat, such as broiling, steaming, or grilling. · Use cooking spray instead of oil.  If you use oil, use a monounsaturated oil, such as canola or olive oil.  · Trim fat from meats before you cook them. · Drain off fat after you brown the meat or while you roast it. · Chill soups and stews after you cook them. Then skim the fat off the top after it hardens. Eating out  · Order foods that are broiled or poached rather than fried or breaded. · Cut back on the amount of butter or margarine that you use on bread. · Order sauces, gravies, and salad dressings on the side, and use only a little. · When you order pasta, choose tomato sauce rather than cream sauce. · Ask for salsa with your baked potato instead of sour cream, butter, cheese, or riggins. · Order meals in a small size instead of upgrading to a large. · Share an entree, or take part of your food home to eat as another meal.  · Share appetizers and desserts. Where can you learn more? Go to http://joslyn-josy.info/. Enter 99 [id number] in the search box to learn more about \"Learning About Cutting Calories. \"  Current as of: March 28, 2018  Content Version: 11.9  © 6812-4343 [employer]. Care instructions adapted under license by [employer] (which disclaims liability or warranty for this information). If you have questions about a medical condition or this instruction, always ask your healthcare professional. [address] any warranty or liability for your use of this information.

## 2019-02-27 NOTE — PROGRESS NOTES
This note will not be viewable in [address] Vinay Whaley is a 58 y.o. female and presents with Sinus Infection  . Subjective: This Hunsucker presents to the office today with over 10 days worth of an upper respiratory infection with the development of sinus congestion maxillary discomfort retro-orbital headaches and purulent sinus drainage. She has had a slight scratchy throat but no significant cough. She has been taking Mucinex and Sudafed along with Afrin nasal spray. She denies any neck stiffness or rash. Patient Active Problem List   Diagnosis Code    Allergic rhinitis J30.9    Osteopenia M85.80     History reviewed. No pertinent surgical history. No Known Allergies  Current Outpatient Medications   Medication Sig Dispense Refill    trimethoprim-sulfamethoxazole (BACTRIM DS, SEPTRA DS) 160-800 mg per tablet Take 1 Tab by mouth two (2) times a day for 10 days. 20 Tab 0    calcium-cholecalciferol, D3, (CALTRATE 600+D) tablet Take 1 Tab by mouth daily.  cranberry extract 450 mg tab tablet Take 450 mg by mouth.  ascorbic acid, vitamin C, (VITAMIN C) 500 mg tablet Take 1,000 mg by mouth daily.  multivitamin (ONE A DAY) tablet Take 1 Tab by mouth daily.        Social History     Socioeconomic History    Marital status:      Spouse name: Not on file    Number of children: Not on file    Years of education: Not on file    Highest education level: Not on file   Tobacco Use    Smoking status: Never Smoker    Smokeless tobacco: Never Used   Substance and Sexual Activity    Alcohol use: No    Drug use: No     Family History   Problem Relation Age of Onset    Elevated Lipids Mother     Cancer Mother         breast    Hypertension Father     Diabetes Father     Macular Degen Father     Heart Disease Father        Review of Systems  Constitutional: negative for fevers, chills, anorexia and weight loss  Eyes:   negative for visual disturbance and irritation  ENT: Positive for sinus congestion, maxillary discomfort, purulent psotnasal draingage and throat irritation  Respiratory:  negative for cough, hemoptysis, dyspnea,wheezing  CV:   negative for chest pain, palpitations, lower extremity edema  GI:   negative for nausea, vomiting, diarrhea, abdominal pain,melena  Integumentary: negative for rash and pruritus  Neurological:  negative for headaches, dizziness, vertigo, memory problems and gait       Objective:  Visit Vitals  /76 (BP 1 Location: Left arm, BP Patient Position: Sitting)   Pulse 94   Temp 98 °F (36.7 °C) (Oral)   Ht 5' 5\" (1.651 m)   Wt 154 lb 3.2 oz (69.9 kg)   SpO2 97%   BMI 25.66 kg/m²     Body mass index is 25.66 kg/m². Physical Exam:   General appearance - alert, well appearing, and in no distress  Mental status - alert, oriented to person, place, and time  EYE-KIMBER, EOMI, sclera clear. No lid swelling or purulent drainage  ENT- TM's clear without A/F level. Pharynx slightly erythematous with drainage noted  Nose - normal and patent, no erythema,  Neck - supple, no significant adenopathy   Chest - clear to auscultation, no wheezes, rales or rhonchi, symmetric air entry   Heart - normal rate, regular rhythm, normal S1, S2, no murmurs, rubs, clicks or gallops   Skin-No rash appreciated  Neuro -alert, oriented, normal speech, no focal findings. Assessment/Plan:  Diagnoses and all orders for this visit:    Acute maxillary sinusitis, recurrence not specified    Other orders  -     trimethoprim-sulfamethoxazole (BACTRIM DS, SEPTRA DS) 160-800 mg per tablet; Take 1 Tab by mouth two (2) times a day for 10 days. , Normal, Disp-20 Tab, R-0        Other Instructions:  Mucinex and Sudafed as directed    Increase po fluids    Follow-up Disposition:  Return if symptoms worsen or fail to improve. I have reviewed with the patient details of the assessment and plan and all questions were answered. Relevent patient education was performed.     An After Visit Summary was printed and given to the patient.     Amber Vazquez MD

## 2019-04-15 ENCOUNTER — OFFICE VISIT (OUTPATIENT)
Dept: INTERNAL MEDICINE CLINIC | Age: 64
End: 2019-04-15

## 2019-04-15 VITALS
HEART RATE: 84 BPM | SYSTOLIC BLOOD PRESSURE: 128 MMHG | TEMPERATURE: 98.2 F | RESPIRATION RATE: 18 BRPM | HEIGHT: 65 IN | OXYGEN SATURATION: 97 % | WEIGHT: 154 LBS | BODY MASS INDEX: 25.66 KG/M2 | DIASTOLIC BLOOD PRESSURE: 78 MMHG

## 2019-04-15 DIAGNOSIS — J01.00 ACUTE MAXILLARY SINUSITIS, RECURRENCE NOT SPECIFIED: Primary | ICD-10-CM

## 2019-04-15 RX ORDER — CEFUROXIME AXETIL 500 MG/1
500 TABLET ORAL 2 TIMES DAILY
Qty: 20 TAB | Refills: 0 | Status: SHIPPED | OUTPATIENT
Start: 2019-04-15 | End: 2019-04-25

## 2019-04-15 NOTE — PROGRESS NOTES
Jasson Palencia is a 58 y.o. female presenting for Sinus Infection  . 1. Have you been to the ER, urgent care clinic since your last visit? Hospitalized since your last visit? No    2. Have you seen or consulted any other health care providers outside of the [address] [place] [address] since your last visit? Include any pap smears or colon screening. No    No flowsheet data found. Abuse Screening Questionnaire 4/15/2019   Do you ever feel afraid of your partner? N   Are you in a relationship with someone who physically or mentally threatens you? N   Is it safe for you to go home? Y       3 most recent PHQ Screens 4/15/2019   Little interest or pleasure in doing things Not at all   Feeling down, depressed, irritable, or hopeless Not at all   Total Score PHQ 2 0       There are no discontinued medications.

## 2019-04-15 NOTE — PROGRESS NOTES
This note will not be viewable in [address] Alen Serrano is a 58 y.o. female and presents with Sinus Infection  . Subjective:  Mrs. Humble Abdi presents to the office today with 7-10 days worth of an upper respiratory infection with the development of sinus congestion, maxillary discomfort and purulent sinus drainage. The drainage has been yellow in color. She has had mild retro-orbital headaches but no facial pain or gum discomfort. She notes that the postnasal drainage is caused a slight cough. There is been no fevers or chills. She has been taking over-the-counter medication without relief. Patient Active Problem List   Diagnosis Code    Allergic rhinitis J30.9    Osteopenia M85.80     History reviewed. No pertinent surgical history. No Known Allergies  Current Outpatient Medications   Medication Sig Dispense Refill    cefUROXime (CEFTIN) 500 mg tablet Take 1 Tab by mouth two (2) times a day for 10 days. 20 Tab 0    calcium-cholecalciferol, D3, (CALTRATE 600+D) tablet Take 1 Tab by mouth daily.  cranberry extract 450 mg tab tablet Take 450 mg by mouth.  ascorbic acid, vitamin C, (VITAMIN C) 500 mg tablet Take 1,000 mg by mouth daily.  multivitamin (ONE A DAY) tablet Take 1 Tab by mouth daily.        Social History     Socioeconomic History    Marital status:      Spouse name: Not on file    Number of children: Not on file    Years of education: Not on file    Highest education level: Not on file   Tobacco Use    Smoking status: Never Smoker    Smokeless tobacco: Never Used   Substance and Sexual Activity    Alcohol use: No    Drug use: No     Family History   Problem Relation Age of Onset    Elevated Lipids Mother     Cancer Mother         breast    Hypertension Father     Diabetes Father     Macular Degen Father     Heart Disease Father        Review of Systems  Constitutional: negative for fevers, chills, anorexia and weight loss  Eyes:   negative for visual disturbance and irritation  ENT:   Positive for sinus congestion, maxillary discomfort, purulent psotnasal draingage and throat irritation  Respiratory:  negative for cough, hemoptysis, dyspnea,wheezing  CV:   negative for chest pain, palpitations, lower extremity edema  GI:   negative for nausea, vomiting, diarrhea, abdominal pain,melena  Integumentary: negative for rash and pruritus  Neurological:  negative for headaches, dizziness, vertigo, memory problems and gait       Objective:  Visit Vitals  /78 (BP 1 Location: Right arm, BP Patient Position: Sitting)   Pulse 84   Temp 98.2 °F (36.8 °C) (Oral)   Resp 18   Ht 5' 5\" (1.651 m)   Wt 154 lb (69.9 kg)   SpO2 97%   BMI 25.63 kg/m²     Body mass index is 25.63 kg/m². Physical Exam:   General appearance - alert, well appearing, and in no distress  Mental status - alert, oriented to person, place, and time  EYE-KIMBER, EOMI, sclera clear. No lid swelling or purulent drainage  ENT- TM's clear without A/F level. Pharynx slightly erythematous with drainage noted  Nose - normal and patent, no erythema,  Neck - supple, no significant adenopathy   Chest - clear to auscultation, no wheezes, rales or rhonchi, symmetric air entry   Heart - normal rate, regular rhythm, normal S1, S2, no murmurs, rubs, clicks or gallops   Skin-No rash appreciated  Neuro -alert, oriented, normal speech, no focal findings. Assessment/Plan:  Diagnoses and all orders for this visit:    Acute maxillary sinusitis, recurrence not specified  -     cefUROXime (CEFTIN) 500 mg tablet; Take 1 Tab by mouth two (2) times a day for 10 days. , Normal, Disp-20 Tab, R-0        Other Instructions:  Mucinex as directed    Increase po fluids    Follow-up and Dispositions    · Return if symptoms worsen or fail to improve. I have reviewed with the patient details of the assessment and plan and all questions were answered. Relevent patient education was performed.     An After Visit Summary was printed and given to the patient.     Mary Neumann MD

## 2019-04-15 NOTE — PATIENT INSTRUCTIONS
Sinusitis: Care Instructions  Your Care Instructions    Sinusitis is an infection of the lining of the sinus cavities in your head. Sinusitis often follows a cold. It causes pain and pressure in your head and face. In most cases, sinusitis gets better on its own in 1 to 2 weeks. But some mild symptoms may last for several weeks. Sometimes antibiotics are needed. Follow-up care is a key part of your treatment and safety. Be sure to make and go to all appointments, and call your doctor if you are having problems. It's also a good idea to know your test results and keep a list of the medicines you take. How can you care for yourself at home? · Take an over-the-counter pain medicine, such as acetaminophen (Tylenol), ibuprofen (Advil, Motrin), or naproxen (Aleve). Read and follow all instructions on the label. · If the doctor prescribed antibiotics, take them as directed. Do not stop taking them just because you feel better. You need to take the full course of antibiotics. · Be careful when taking over-the-counter cold or flu medicines and Tylenol at the same time. Many of these medicines have acetaminophen, which is Tylenol. Read the labels to make sure that you are not taking more than the recommended dose. Too much acetaminophen (Tylenol) can be harmful. · Breathe warm, moist air from a steamy shower, a hot bath, or a sink filled with hot water. Avoid cold, dry air. Using a humidifier in your home may help. Follow the directions for cleaning the machine. · Use saline (saltwater) nasal washes to help keep your nasal passages open and wash out mucus and bacteria. You can buy saline nose drops at a grocery store or drugstore. Or you can make your own at home by adding 1 teaspoon of salt and 1 teaspoon of baking soda to 2 cups of distilled water. If you make your own, fill a bulb syringe with the solution, insert the tip into your nostril, and squeeze gently. Alease Ruffini your nose.   · Put a hot, wet towel or a warm gel pack on your face 3 or 4 times a day for 5 to 10 minutes each time. · Try a decongestant nasal spray like oxymetazoline (Afrin). Do not use it for more than 3 days in a row. Using it for more than 3 days can make your congestion worse. When should you call for help? Call your doctor now or seek immediate medical care if:    · You have new or worse swelling or redness in your face or around your eyes.     · You have a new or higher fever.    Watch closely for changes in your health, and be sure to contact your doctor if:    · You have new or worse facial pain.     · The mucus from your nose becomes thicker (like pus) or has new blood in it.     · You are not getting better as expected. Where can you learn more? Go to http://joslyn-josy.info/. Enter Y842 in the search box to learn more about \"Sinusitis: Care Instructions. \"  Current as of: March 27, 2018  Content Version: 11.9  © 2908-4472 [employer]. Care instructions adapted under license by [employer] (which disclaims liability or warranty for this information). If you have questions about a medical condition or this instruction, always ask your healthcare professional. [place] [address] any warranty or liability for your use of this information.

## 2019-05-30 ENCOUNTER — OFFICE VISIT (OUTPATIENT)
Dept: INTERNAL MEDICINE CLINIC | Age: 64
End: 2019-05-30

## 2019-05-30 VITALS
WEIGHT: 156.2 LBS | DIASTOLIC BLOOD PRESSURE: 80 MMHG | OXYGEN SATURATION: 96 % | BODY MASS INDEX: 26.02 KG/M2 | HEIGHT: 65 IN | HEART RATE: 100 BPM | TEMPERATURE: 98.4 F | SYSTOLIC BLOOD PRESSURE: 118 MMHG

## 2019-05-30 DIAGNOSIS — N39.0 URINARY TRACT INFECTION WITHOUT HEMATURIA, SITE UNSPECIFIED: Primary | ICD-10-CM

## 2019-05-30 LAB
BACTERIA,BACTU: ABNORMAL
BILIRUB UR QL: NEGATIVE
CLARITY: ABNORMAL
COLOR UR: ABNORMAL
GLUCOSE 24H UR-MRATE: NEGATIVE G/(24.H)
HGB UR QL STRIP: ABNORMAL
KETONES UR QL STRIP.AUTO: NEGATIVE
LEUKOCYTE ESTERASE: ABNORMAL
NITRITE UR QL STRIP.AUTO: NEGATIVE
PH UR STRIP: 7 [PH] (ref 5–7)
PROT UR STRIP-MCNC: ABNORMAL MG/DL
RBC #/AREA URNS HPF: ABNORMAL #/HPF
SP GR UR REFRACTOMETRY: 1.01 (ref 1–1.03)
UROBILINOGEN UR QL STRIP.AUTO: NEGATIVE
WBC URNS QL MICRO: ABNORMAL #/HPF

## 2019-05-30 RX ORDER — SULFAMETHOXAZOLE AND TRIMETHOPRIM 800; 160 MG/1; MG/1
1 TABLET ORAL 2 TIMES DAILY
Qty: 14 TAB | Refills: 0 | Status: SHIPPED | OUTPATIENT
Start: 2019-05-30 | End: 2019-06-06

## 2019-05-30 NOTE — PROGRESS NOTES
This note will not be viewable in [address] Subjective: The patient presents to the office with complaints of a possible UTI. Complains of dysuria, frequency and urgency ongoing for 3-4 days. Denies hematuria, abdominal pain or flank pain. No fever, chills, nausea or vomiting. Past Medical History:   Diagnosis Date    Allergic rhinitis 9/20/2017    Osteopenia 9/20/2017     History reviewed. No pertinent surgical history. No Known Allergies  Current Outpatient Medications   Medication Sig Dispense Refill    trimethoprim-sulfamethoxazole (BACTRIM DS, SEPTRA DS) 160-800 mg per tablet Take 1 Tab by mouth two (2) times a day for 7 days. 14 Tab 0    calcium-cholecalciferol, D3, (CALTRATE 600+D) tablet Take 1 Tab by mouth daily.  cranberry extract 450 mg tab tablet Take 450 mg by mouth.  ascorbic acid, vitamin C, (VITAMIN C) 500 mg tablet Take 1,000 mg by mouth daily.  multivitamin (ONE A DAY) tablet Take 1 Tab by mouth daily.        Social History     Socioeconomic History    Marital status:      Spouse name: Not on file    Number of children: Not on file    Years of education: Not on file    Highest education level: Not on file   Tobacco Use    Smoking status: Never Smoker    Smokeless tobacco: Never Used   Substance and Sexual Activity    Alcohol use: No    Drug use: No     Family History   Problem Relation Age of Onset    Elevated Lipids Mother     Cancer Mother         breast    Hypertension Father     Diabetes Father     Macular Degen Father     Heart Disease Father        Review of Systems:  GEN: no fever,chills or sweats  CV: no PND, orthopnea, or chest pain  Resp: no dyspnea on exertion, no cough  Abd: no nausea, vomiting or diarrhea or abdominal pain  Back: denies flank pain  : positive for dysuria, frequency and urgency  Neurological ROS: no TIA or stroke symptoms  ROS otherwise negative      Objective:     Visit Vitals  /80 (BP 1 Location: Right arm, BP Patient Position: Sitting)   Pulse 100   Temp 98.4 °F (36.9 °C) (Oral)   Ht 5' 5\" (1.651 m)   Wt 156 lb 3.2 oz (70.9 kg)   SpO2 96%   BMI 25.99 kg/m²     Body mass index is 25.99 kg/m². General:   alert, cooperative and no distress   Skin: No rash appreciated   Neck: Supple   Heart: RRR without murmur   Lungs: Clear to auscultation bilaterally   Abdomen: Soft, nontender. Normal bowel sounds   Back: No CVAT present   Neuro: No focal deficits appreciated     Physical exam otherwise negative         Assessment/Plan:     Diagnoses and all orders for this visit:    Urinary tract infection without hematuria, site unspecified  -     CULTURE, URINE  -     URINALYSIS W/MICROSCOPIC  -     trimethoprim-sulfamethoxazole (BACTRIM DS, SEPTRA DS) 160-800 mg per tablet; Take 1 Tab by mouth two (2) times a day for 7 days. , Normal, Disp-14 Tab, R-0        Other instructions:   Await results of urine culture    Azo-standard as needed for discomfort    Increase po fluids    Follow-up and Dispositions    · Return if symptoms worsen or fail to improve.          Tita Cabrera MD

## 2019-05-30 NOTE — PATIENT INSTRUCTIONS
Urinary Tract Infection in Women: Care Instructions Your Care Instructions A urinary tract infection, or UTI, is a general term for an infection anywhere between the kidneys and the urethra (where urine comes out). Most UTIs are bladder infections. They often cause pain or burning when you urinate. UTIs are caused by bacteria and can be cured with antibiotics. Be sure to complete your treatment so that the infection goes away. Follow-up care is a key part of your treatment and safety. Be sure to make and go to all appointments, and call your doctor if you are having problems. It's also a good idea to know your test results and keep a list of the medicines you take. How can you care for yourself at home? · Take your antibiotics as directed. Do not stop taking them just because you feel better. You need to take the full course of antibiotics. · Drink extra water and other fluids for the next day or two. This may help wash out the bacteria that are causing the infection. (If you have kidney, heart, or liver disease and have to limit fluids, talk with your doctor before you increase your fluid intake.) · Avoid drinks that are carbonated or have caffeine. They can irritate the bladder. · Urinate often. Try to empty your bladder each time. · To relieve pain, take a hot bath or lay a heating pad set on low over your lower belly or genital area. Never go to sleep with a heating pad in place. To prevent UTIs · Drink plenty of water each day. This helps you urinate often, which clears bacteria from your system. (If you have kidney, heart, or liver disease and have to limit fluids, talk with your doctor before you increase your fluid intake.) · Urinate when you need to. · Urinate right after you have sex. · Change sanitary pads often. · Avoid douches, bubble baths, feminine hygiene sprays, and other feminine hygiene products that have deodorants. · After going to the bathroom, wipe from front to back. When should you call for help? Call your doctor now or seek immediate medical care if: 
  · Symptoms such as fever, chills, nausea, or vomiting get worse or appear for the first time.  
  · You have new pain in your back just below your rib cage. This is called flank pain.  
  · There is new blood or pus in your urine.  
  · You have any problems with your antibiotic medicine.  
 Watch closely for changes in your health, and be sure to contact your doctor if: 
  · You are not getting better after taking an antibiotic for 2 days.  
  · Your symptoms go away but then come back. Where can you learn more? Go to http://joslyn-josy.info/. Enter E400 in the search box to learn more about \"Urinary Tract Infection in Women: Care Instructions. \" Current as of: March 20, 2018 Content Version: 11.9 © 3806-1320 [employer]. Care instructions adapted under license by [employer] (which disclaims liability or warranty for this information). If you have questions about a medical condition or this instruction, always ask your healthcare professional. [address] any warranty or liability for your use of this information.

## 2019-05-30 NOTE — PROGRESS NOTES
Mel Guerrero is a 58 y.o. female presenting for Bladder Infection  . 1. Have you been to the ER, urgent care clinic since your last visit? Hospitalized since your last visit? No    2. Have you seen or consulted any other health care providers outside of the [address] [place] [address] since your last visit? Include any pap smears or colon screening. Yes When: 5-22-19 Where: ENT Reason for visit: ear infection. No flowsheet data found. Abuse Screening Questionnaire 4/15/2019   Do you ever feel afraid of your partner? N   Are you in a relationship with someone who physically or mentally threatens you? N   Is it safe for you to go home? Y       3 most recent PHQ Screens 4/15/2019   Little interest or pleasure in doing things Not at all   Feeling down, depressed, irritable, or hopeless Not at all   Total Score PHQ 2 0       There are no discontinued medications.

## 2019-06-02 LAB — BACTERIA UR CULT: ABNORMAL

## 2019-06-02 NOTE — PROGRESS NOTES
Urine infected with bacteria resistant to current antibiotic - stop this  Change to ceftin 250 mg BID #14

## 2019-06-03 ENCOUNTER — TELEPHONE (OUTPATIENT)
Dept: INTERNAL MEDICINE CLINIC | Age: 64
End: 2019-06-03

## 2019-06-03 RX ORDER — CEFUROXIME AXETIL 250 MG/1
250 TABLET ORAL 2 TIMES DAILY
Qty: 14 TAB | Refills: 0 | Status: SHIPPED | OUTPATIENT
Start: 2019-06-03 | End: 2019-09-10 | Stop reason: ALTCHOICE

## 2019-06-03 NOTE — TELEPHONE ENCOUNTER
Requested Prescriptions     Pending Prescriptions Disp Refills    cefUROXime (CEFTIN) 250 mg tablet 14 Tab 0     Sig: Take 1 Tab by mouth two (2) times a day.

## 2019-06-03 NOTE — TELEPHONE ENCOUNTER
----- Message from Doug Bass MD sent at 6/2/2019  7:41 AM EDT -----  Urine infected with bacteria resistant to current antibiotic - stop this  Change to ceftin 250 mg BID #14

## 2019-06-03 NOTE — TELEPHONE ENCOUNTER
Patient is calling in regards to a missed call from [facility] in regards to her most recent urinalysis.     Best call back # for patient: [phone]

## 2019-09-10 ENCOUNTER — TELEPHONE (OUTPATIENT)
Dept: INTERNAL MEDICINE CLINIC | Age: 64
End: 2019-09-10

## 2019-09-10 ENCOUNTER — OFFICE VISIT (OUTPATIENT)
Dept: INTERNAL MEDICINE CLINIC | Age: 64
End: 2019-09-10

## 2019-09-10 VITALS
WEIGHT: 156.2 LBS | HEART RATE: 94 BPM | SYSTOLIC BLOOD PRESSURE: 130 MMHG | RESPIRATION RATE: 20 BRPM | HEIGHT: 65 IN | BODY MASS INDEX: 26.02 KG/M2 | TEMPERATURE: 98.4 F | DIASTOLIC BLOOD PRESSURE: 80 MMHG | OXYGEN SATURATION: 97 %

## 2019-09-10 DIAGNOSIS — J01.00 ACUTE MAXILLARY SINUSITIS, RECURRENCE NOT SPECIFIED: Primary | ICD-10-CM

## 2019-09-10 RX ORDER — CEFDINIR 300 MG/1
300 CAPSULE ORAL 2 TIMES DAILY
Qty: 20 CAP | Refills: 0 | Status: SHIPPED | OUTPATIENT
Start: 2019-09-10 | End: 2019-09-20

## 2019-09-10 NOTE — PATIENT INSTRUCTIONS
Sinusitis: Care Instructions  Your Care Instructions    Sinusitis is an infection of the lining of the sinus cavities in your head. Sinusitis often follows a cold. It causes pain and pressure in your head and face. In most cases, sinusitis gets better on its own in 1 to 2 weeks. But some mild symptoms may last for several weeks. Sometimes antibiotics are needed. Follow-up care is a key part of your treatment and safety. Be sure to make and go to all appointments, and call your doctor if you are having problems. It's also a good idea to know your test results and keep a list of the medicines you take. How can you care for yourself at home? · Take an over-the-counter pain medicine, such as acetaminophen (Tylenol), ibuprofen (Advil, Motrin), or naproxen (Aleve). Read and follow all instructions on the label. · If the doctor prescribed antibiotics, take them as directed. Do not stop taking them just because you feel better. You need to take the full course of antibiotics. · Be careful when taking over-the-counter cold or flu medicines and Tylenol at the same time. Many of these medicines have acetaminophen, which is Tylenol. Read the labels to make sure that you are not taking more than the recommended dose. Too much acetaminophen (Tylenol) can be harmful. · Breathe warm, moist air from a steamy shower, a hot bath, or a sink filled with hot water. Avoid cold, dry air. Using a humidifier in your home may help. Follow the directions for cleaning the machine. · Use saline (saltwater) nasal washes to help keep your nasal passages open and wash out mucus and bacteria. You can buy saline nose drops at a grocery store or drugstore. Or you can make your own at home by adding 1 teaspoon of salt and 1 teaspoon of baking soda to 2 cups of distilled water. If you make your own, fill a bulb syringe with the solution, insert the tip into your nostril, and squeeze gently. Roberto Ok your nose.   · Put a hot, wet towel or a warm gel pack on your face 3 or 4 times a day for 5 to 10 minutes each time. · Try a decongestant nasal spray like oxymetazoline (Afrin). Do not use it for more than 3 days in a row. Using it for more than 3 days can make your congestion worse. When should you call for help? Call your doctor now or seek immediate medical care if:    · You have new or worse swelling or redness in your face or around your eyes.     · You have a new or higher fever.    Watch closely for changes in your health, and be sure to contact your doctor if:    · You have new or worse facial pain.     · The mucus from your nose becomes thicker (like pus) or has new blood in it.     · You are not getting better as expected. Where can you learn more? Go to http://joslyn-josy.info/. Enter R319 in the search box to learn more about \"Sinusitis: Care Instructions. \"  Current as of: October 21, 2018  Content Version: 12.1  © 0136-0512 [employer]. Care instructions adapted under license by [employer] (which disclaims liability or warranty for this information). If you have questions about a medical condition or this instruction, always ask your healthcare professional. [address] any warranty or liability for your use of this information.

## 2019-09-10 NOTE — PROGRESS NOTES
Esme Lima is a 58 y.o. female presenting for Cough and Sinus Infection  . 1. Have you been to the ER, urgent care clinic since your last visit? Hospitalized since your last visit? No    2. Have you seen or consulted any other health care providers outside of the [address] [place] [address] since your last visit? Include any pap smears or colon screening. No    No flowsheet data found. Abuse Screening Questionnaire 4/15/2019   Do you ever feel afraid of your partner? N   Are you in a relationship with someone who physically or mentally threatens you? N   Is it safe for you to go home? Y       3 most recent PHQ Screens 4/15/2019   Little interest or pleasure in doing things Not at all   Feeling down, depressed, irritable, or hopeless Not at all   Total Score PHQ 2 0       There are no discontinued medications.

## 2019-09-10 NOTE — PROGRESS NOTES
This note will not be viewable in [address] Felicita Seay is a 58 y.o. female and presents with Cough and Sinus Infection  . Subjective:  Mrs. Jaz Brunson presents to the office today with complaints of an upper respiratory infection ongoing over the last week with the development of sinus congestion maxillary discomfort retro-orbital headaches and purulent green postnasal drainage. She is started with a cough which is been somewhat paroxysmal.  She has been taking Mucinex D without relief. She denies high fever or shaking chills and there is been no neck stiffness or rash. Patient Active Problem List   Diagnosis Code    Allergic rhinitis J30.9    Osteopenia M85.80     History reviewed. No pertinent surgical history. No Known Allergies  Current Outpatient Medications   Medication Sig Dispense Refill    cefdinir (OMNICEF) 300 mg capsule Take 1 Cap by mouth two (2) times a day for 10 days. 20 Cap 0    calcium-cholecalciferol, D3, (CALTRATE 600+D) tablet Take 1 Tab by mouth daily.  cranberry extract 450 mg tab tablet Take 450 mg by mouth.  ascorbic acid, vitamin C, (VITAMIN C) 500 mg tablet Take 1,000 mg by mouth daily.  multivitamin (ONE A DAY) tablet Take 1 Tab by mouth daily.        Social History     Socioeconomic History    Marital status:      Spouse name: Not on file    Number of children: Not on file    Years of education: Not on file    Highest education level: Not on file   Tobacco Use    Smoking status: Never Smoker    Smokeless tobacco: Never Used   Substance and Sexual Activity    Alcohol use: No    Drug use: No     Family History   Problem Relation Age of Onset    Elevated Lipids Mother     Cancer Mother         breast    Hypertension Father     Diabetes Father     Macular Degen Father     Heart Disease Father        Review of Systems  Constitutional: negative for fevers, chills, anorexia and weight loss  Eyes:   negative for visual disturbance and irritation  ENT:   Positive for sinus congestion, maxillary discomfort, purulent psotnasal draingage and throat irritation  Respiratory:  negative for cough, hemoptysis, dyspnea,wheezing  CV:   negative for chest pain, palpitations, lower extremity edema  GI:   negative for nausea, vomiting, diarrhea, abdominal pain,melena  Integumentary: negative for rash and pruritus  Neurological:  negative for headaches, dizziness, vertigo, memory problems and gait       Objective:  Visit Vitals  /80 (BP 1 Location: Right arm, BP Patient Position: Sitting)   Pulse 94   Temp 98.4 °F (36.9 °C) (Oral)   Resp 20   Ht 5' 5\" (1.651 m)   Wt 156 lb 3.2 oz (70.9 kg)   SpO2 97%   BMI 25.99 kg/m²     Body mass index is 25.99 kg/m². Physical Exam:   General appearance - alert, well appearing, and in no distress  Mental status - alert, oriented to person, place, and time  EYE-KIMBER, EOMI, sclera clear. No lid swelling or purulent drainage  ENT- TM's clear without A/F level. Pharynx slightly erythematous with drainage noted  Nose - normal and patent, no erythema,  Neck - supple, no significant adenopathy   Chest - clear to auscultation, no wheezes, rales or rhonchi, symmetric air entry   Heart - normal rate, regular rhythm, normal S1, S2, no murmurs, rubs, clicks or gallops   Skin-No rash appreciated  Neuro -alert, oriented, normal speech, no focal findings. Assessment/Plan:  Diagnoses and all orders for this visit:    Acute maxillary sinusitis, recurrence not specified  -     cefdinir (OMNICEF) 300 mg capsule; Take 1 Cap by mouth two (2) times a day for 10 days. , Normal, Disp-20 Cap, R-0        Other Instructions:  Mucinex as directed    Increase po fluids    Follow-up and Dispositions    · Return if symptoms worsen or fail to improve. I have reviewed with the patient details of the assessment and plan and all questions were answered. Relevent patient education was performed.     An After Visit Summary was printed and given to the patient.     Halle Huerta MD

## 2019-11-05 ENCOUNTER — TELEPHONE (OUTPATIENT)
Dept: INTERNAL MEDICINE CLINIC | Age: 64
End: 2019-11-05

## 2019-11-05 NOTE — TELEPHONE ENCOUNTER
Tremors in her hand. Advised she may need to be evaluated first. Would like an appointment on a Wednesday if possible.

## 2019-11-08 ENCOUNTER — OFFICE VISIT (OUTPATIENT)
Dept: INTERNAL MEDICINE CLINIC | Age: 64
End: 2019-11-08

## 2019-11-08 VITALS
OXYGEN SATURATION: 97 % | SYSTOLIC BLOOD PRESSURE: 126 MMHG | HEART RATE: 77 BPM | HEIGHT: 65 IN | BODY MASS INDEX: 25.89 KG/M2 | RESPIRATION RATE: 20 BRPM | TEMPERATURE: 98.1 F | DIASTOLIC BLOOD PRESSURE: 80 MMHG | WEIGHT: 155.4 LBS

## 2019-11-08 DIAGNOSIS — R25.1 TREMOR OF LEFT HAND: Primary | ICD-10-CM

## 2019-11-08 NOTE — PATIENT INSTRUCTIONS
Benign Essential Tremor: Care Instructions  Your Care Instructions    Benign essential tremor is a medical term for shaking that you can't control. Your hand or fingers may shake when you lift a cup or point at something. Or your voice may shake when you speak. This type of tremor is not harmful. It is not caused by a stroke or Parkinson's disease. Some things can affect how much you shake. For example, drinking or eating something with caffeine may make tremors worse for a while. Some medicines also can increase tremors. These include antidepressants and too much thyroid replacement. Talk to your doctor if you think one of your medicines makes your tremors worse. If you are self-conscious about your tremors, there are some things you can do to reduce them or make them less noticeable. This includes taking medicine. Follow-up care is a key part of your treatment and safety. Be sure to make and go to all appointments, and call your doctor if you are having problems. It's also a good idea to know your test results and keep a list of the medicines you take. How can you care for yourself at home? · Take your medicines exactly as prescribed. Call your doctor if you think you are having a problem with your medicine. Some medicines that help control tremors have to be taken every day, even if you are not having tremors. You will get more details on the specific medicines your doctor prescribes. · Get plenty of rest.  · Eat a balanced, healthy diet. · Try to reduce stress. Regular exercise and massages may help. · Limit alcohol. Heavy drinking can make your tremors worse. · Avoid drinks or foods with caffeine if they make your tremors worse. These include tea, cola, coffee, and chocolate. · Wear a heavy bracelet or watch. This adds a little weight to your hand. The extra weight may reduce tremors. · Drink from cups or glasses that are only half full. You may also want to try drinking with a straw.   When should you call for help? Watch closely for changes in your health, and be sure to contact your doctor if:    · You notice your tremors are getting worse.     · You can't do your everyday activities because of your tremors.     · You are sad and embarrassed about your shaking. Where can you learn more? Go to http://joslyn-josy.info/. Enter B746 in the search box to learn more about \"Benign Essential Tremor: Care Instructions. \"  Current as of: March 28, 2019  Content Version: 12.2  © 0723-3908 [employer]. Care instructions adapted under license by [employer] (which disclaims liability or warranty for this information). If you have questions about a medical condition or this instruction, always ask your healthcare professional. [address] any warranty or liability for your use of this information.

## 2019-11-08 NOTE — PROGRESS NOTES
Aracely Frye is a 71 y.o. female presenting for Tremors (L hand)  . 1. Have you been to the ER, urgent care clinic since your last visit? Hospitalized since your last visit? No    2. Have you seen or consulted any other health care providers outside of the [address] [place] [address] since your last visit? Include any pap smears or colon screening. No    No flowsheet data found. Abuse Screening Questionnaire 4/15/2019   Do you ever feel afraid of your partner? N   Are you in a relationship with someone who physically or mentally threatens you? N   Is it safe for you to go home? Y       3 most recent PHQ Screens 4/15/2019   Little interest or pleasure in doing things Not at all   Feeling down, depressed, irritable, or hopeless Not at all   Total Score PHQ 2 0       There are no discontinued medications.

## 2019-11-08 NOTE — PROGRESS NOTES
This note will not be viewable in [address] Subjective:     Mrs. Yaneli Mishra presents to the office today with complaints of a fine tremor involving her left hand. She is only recently noticed this within the last month. She notes that the tremor occurs generally at rest and is very fine. She notes no tremor in her arm or any other extremity. She has had no difficulty with walking and denies any neurological dysfunction otherwise. She notes that the tremor does not seem to be present when she is moving or using her hand. She has been concerned because another family member has recently been diagnosed with Parkinson's disease. Past Medical History:   Diagnosis Date    Allergic rhinitis 9/20/2017    Osteopenia 9/20/2017     History reviewed. No pertinent surgical history. No Known Allergies  Current Outpatient Medications   Medication Sig Dispense Refill    calcium-cholecalciferol, D3, (CALTRATE 600+D) tablet Take 1 Tab by mouth daily.  cranberry extract 450 mg tab tablet Take 450 mg by mouth.  ascorbic acid, vitamin C, (VITAMIN C) 500 mg tablet Take 1,000 mg by mouth daily.  multivitamin (ONE A DAY) tablet Take 1 Tab by mouth daily.        Social History     Socioeconomic History    Marital status:      Spouse name: Not on file    Number of children: Not on file    Years of education: Not on file    Highest education level: Not on file   Tobacco Use    Smoking status: Never Smoker    Smokeless tobacco: Never Used   Substance and Sexual Activity    Alcohol use: No    Drug use: No     Family History   Problem Relation Age of Onset    Elevated Lipids Mother     Cancer Mother         breast    Hypertension Father     Diabetes Father     Macular Degen Father     Heart Disease Father        Review of Systems:  GEN: no weight loss, weight gain, fatigue or night sweats  CV: no PND, orthopnea, or palpitations  Resp: no dyspnea on exertion, no cough  Abd: no nausea, vomiting or diarrhea  EXT: denies edema, claudication  Endocrine: no hair loss, excessive thirst or polyuria  Neurological ROS: no TIA or stroke symptoms. Complains of fine left hand tremor  ROS otherwise negative      Objective:     Visit Vitals  /80 (BP 1 Location: Left arm, BP Patient Position: Sitting)   Pulse 77   Temp 98.1 °F (36.7 °C) (Oral)   Resp 20   Ht 5' 5\" (1.651 m)   Wt 155 lb 6.4 oz (70.5 kg)   SpO2 97%   BMI 25.86 kg/m²     Body mass index is 25.86 kg/m². General:   alert, cooperative and no distress   Eyes: conjunctivae/sclerae clear. PERRL, EOM's intact   Mouth:  No oral lesions, no pharyngeal erythema, no exudates   Neck: Trachea midline, no thyromegaly, no bruits   Heart: S1 and S2 normal,no murmurs noted    Lungs: Clear to auscultation bilaterally, no increased work of breathing   Abdomen: Soft, nontender. Normal bowel sounds   Extremities: No edema or cyanosis   Neuro: ..alert, oriented x3,speech normal in context and clarity, cranial nerves II-XII intact,motor strength: full proximally and distally,gait: normal  reflexes: full and symmetric. A very fine hand tremor is noted but is not constant. A pill-rolling tremor was not identified. Physical exam otherwise negative         Assessment/Plan:     Diagnoses and all orders for this visit:    Tremor of left hand  -     REFERRAL TO NEUROLOGY        Other instructions: The patient's medications were reviewed and reconciled. She has a very fine intermittent tremor of her left hand suggestive of a benign familial tremor. A neurological consultation will be obtained to make sure that she does not have a early Parkinson's symptoms. Follow-up here as previously scheduled    Follow-up and Dispositions    · Return for As previously scheduled.          Angus Decker MD

## 2019-11-22 ENCOUNTER — OFFICE VISIT (OUTPATIENT)
Dept: INTERNAL MEDICINE CLINIC | Age: 64
End: 2019-11-22

## 2019-11-22 VITALS
SYSTOLIC BLOOD PRESSURE: 120 MMHG | HEIGHT: 63 IN | TEMPERATURE: 97.4 F | RESPIRATION RATE: 18 BRPM | OXYGEN SATURATION: 97 % | HEART RATE: 68 BPM | DIASTOLIC BLOOD PRESSURE: 80 MMHG | BODY MASS INDEX: 27.29 KG/M2 | WEIGHT: 154 LBS

## 2019-11-22 DIAGNOSIS — N39.0 URINARY TRACT INFECTION WITHOUT HEMATURIA, SITE UNSPECIFIED: Primary | ICD-10-CM

## 2019-11-22 LAB
BACTERIA,BACTU: ABNORMAL
BILIRUB UR QL: NEGATIVE
CLARITY: ABNORMAL
COLOR UR: ABNORMAL
GLUCOSE 24H UR-MRATE: NEGATIVE G/(24.H)
HGB UR QL STRIP: ABNORMAL
KETONES UR QL STRIP.AUTO: NEGATIVE
LEUKOCYTE ESTERASE: ABNORMAL
NITRITE UR QL STRIP.AUTO: NEGATIVE
PH UR STRIP: 6.5 [PH] (ref 5–7)
PROT UR STRIP-MCNC: ABNORMAL MG/DL
RBC #/AREA URNS HPF: ABNORMAL #/HPF
SP GR UR REFRACTOMETRY: 1.01 (ref 1–1.03)
UROBILINOGEN UR QL STRIP.AUTO: NEGATIVE
WBC URNS QL MICRO: ABNORMAL #/HPF

## 2019-11-22 RX ORDER — NITROFURANTOIN 25; 75 MG/1; MG/1
100 CAPSULE ORAL 2 TIMES DAILY
Qty: 14 CAP | Refills: 0 | Status: SHIPPED | OUTPATIENT
Start: 2019-11-22 | End: 2019-11-29

## 2019-11-22 NOTE — PROGRESS NOTES
This note will not be viewable in [address] Subjective: The patient presents to the office with complaints of a possible UTI. Complains of dysuria, frequency and urgency ongoing for 7-10 days. Denies hematuria, abdominal pain or flank pain. No fever, chills, nausea or vomiting. Past Medical History:   Diagnosis Date    Allergic rhinitis 9/20/2017    Osteopenia 9/20/2017     History reviewed. No pertinent surgical history. No Known Allergies  Current Outpatient Medications   Medication Sig Dispense Refill    nitrofurantoin, macrocrystal-monohydrate, (MACROBID) 100 mg capsule Take 1 Cap by mouth two (2) times a day for 7 days. 14 Cap 0    calcium-cholecalciferol, D3, (CALTRATE 600+D) tablet Take 1 Tab by mouth daily.  cranberry extract 450 mg tab tablet Take 450 mg by mouth.  ascorbic acid, vitamin C, (VITAMIN C) 500 mg tablet Take 1,000 mg by mouth daily.  multivitamin (ONE A DAY) tablet Take 1 Tab by mouth daily.        Social History     Socioeconomic History    Marital status:      Spouse name: Not on file    Number of children: Not on file    Years of education: Not on file    Highest education level: Not on file   Tobacco Use    Smoking status: Never Smoker    Smokeless tobacco: Never Used   Substance and Sexual Activity    Alcohol use: No    Drug use: No     Family History   Problem Relation Age of Onset    Elevated Lipids Mother     Cancer Mother         breast    Hypertension Father     Diabetes Father     Macular Degen Father     Heart Disease Father        Review of Systems:  GEN: no fever,chills or sweats  CV: no PND, orthopnea, or chest pain  Resp: no dyspnea on exertion, no cough  Abd: no nausea, vomiting or diarrhea or abdominal pain  Back: denies flank pain  : positive for dysuria, frequency and urgency  Neurological ROS: no TIA or stroke symptoms  ROS otherwise negative      Objective:     Visit Vitals  /80 (BP 1 Location: Left arm, BP Patient Position: Sitting)   Pulse 68   Temp 97.4 °F (36.3 °C) (Oral)   Resp 18   Ht 5' 3.25\" (1.607 m)   Wt 154 lb (69.9 kg)   SpO2 97%   BMI 27.06 kg/m²     Body mass index is 27.06 kg/m². General:   alert, cooperative and no distress   Skin: No rash appreciated   Neck: Supple   Heart: RRR without murmur   Lungs: Clear to auscultation bilaterally   Abdomen: Soft, nontender. Normal bowel sounds   Back: No CVAT present   Neuro: No focal deficits appreciated     Physical exam otherwise negative         Assessment/Plan:     Diagnoses and all orders for this visit:    Urinary tract infection without hematuria, site unspecified  -     URINALYSIS W/O MICRO  -     CULTURE, URINE  -     nitrofurantoin, macrocrystal-monohydrate, (MACROBID) 100 mg capsule; Take 1 Cap by mouth two (2) times a day for 7 days. , Normal, Disp-14 Cap, R-0        Other instructions:   Await results of urine culture    Azo-standard as needed for discomfort    Increase po fluids    Follow-up and Dispositions    · Return if symptoms worsen or fail to improve.          Ama Cam MD

## 2019-11-22 NOTE — PATIENT INSTRUCTIONS

## 2019-11-22 NOTE — PROGRESS NOTES
Orlando Duran is a 71 y.o. female presenting for Bladder Infection  . 1. Have you been to the ER, urgent care clinic since your last visit? Hospitalized since your last visit? No    2. Have you seen or consulted any other health care providers outside of the [address] [place] [address] since your last visit? Include any pap smears or colon screening. No    Fall Risk Assessment, last 12 mths 11/8/2019   Able to walk? Yes   Fall in past 12 months? No         Abuse Screening Questionnaire 4/15/2019   Do you ever feel afraid of your partner? N   Are you in a relationship with someone who physically or mentally threatens you? N   Is it safe for you to go home? Y       3 most recent PHQ Screens 4/15/2019   Little interest or pleasure in doing things Not at all   Feeling down, depressed, irritable, or hopeless Not at all   Total Score PHQ 2 0       There are no discontinued medications.

## 2019-11-24 LAB — BACTERIA UR CULT: NORMAL

## 2019-11-24 NOTE — PROGRESS NOTES
Urine had too numerous to count WBC's but urine culture did not grow out any specific organism. Would finish course of antibiotics if she is improved since she started it.

## 2020-03-12 ENCOUNTER — OFFICE VISIT (OUTPATIENT)
Dept: INTERNAL MEDICINE CLINIC | Age: 65
End: 2020-03-12

## 2020-03-12 VITALS
WEIGHT: 154.6 LBS | BODY MASS INDEX: 27.39 KG/M2 | HEART RATE: 85 BPM | OXYGEN SATURATION: 97 % | TEMPERATURE: 97.5 F | DIASTOLIC BLOOD PRESSURE: 74 MMHG | RESPIRATION RATE: 20 BRPM | SYSTOLIC BLOOD PRESSURE: 110 MMHG | HEIGHT: 63 IN

## 2020-03-12 DIAGNOSIS — J01.00 ACUTE MAXILLARY SINUSITIS, RECURRENCE NOT SPECIFIED: Primary | ICD-10-CM

## 2020-03-12 RX ORDER — CEFDINIR 300 MG/1
300 CAPSULE ORAL 2 TIMES DAILY
Qty: 20 CAP | Refills: 0 | Status: SHIPPED | OUTPATIENT
Start: 2020-03-12 | End: 2020-03-22

## 2020-03-12 NOTE — PATIENT INSTRUCTIONS
Sinusitis: Care Instructions  Your Care Instructions    Sinusitis is an infection of the lining of the sinus cavities in your head. Sinusitis often follows a cold. It causes pain and pressure in your head and face. In most cases, sinusitis gets better on its own in 1 to 2 weeks. But some mild symptoms may last for several weeks. Sometimes antibiotics are needed. Follow-up care is a key part of your treatment and safety. Be sure to make and go to all appointments, and call your doctor if you are having problems. It's also a good idea to know your test results and keep a list of the medicines you take. How can you care for yourself at home? · Take an over-the-counter pain medicine, such as acetaminophen (Tylenol), ibuprofen (Advil, Motrin), or naproxen (Aleve). Read and follow all instructions on the label. · If the doctor prescribed antibiotics, take them as directed. Do not stop taking them just because you feel better. You need to take the full course of antibiotics. · Be careful when taking over-the-counter cold or flu medicines and Tylenol at the same time. Many of these medicines have acetaminophen, which is Tylenol. Read the labels to make sure that you are not taking more than the recommended dose. Too much acetaminophen (Tylenol) can be harmful. · Breathe warm, moist air from a steamy shower, a hot bath, or a sink filled with hot water. Avoid cold, dry air. Using a humidifier in your home may help. Follow the directions for cleaning the machine. · Use saline (saltwater) nasal washes to help keep your nasal passages open and wash out mucus and bacteria. You can buy saline nose drops at a grocery store or drugstore. Or you can make your own at home by adding 1 teaspoon of salt and 1 teaspoon of baking soda to 2 cups of distilled water. If you make your own, fill a bulb syringe with the solution, insert the tip into your nostril, and squeeze gently. Ryan Chad your nose.   · Put a hot, wet towel or a warm gel pack on your face 3 or 4 times a day for 5 to 10 minutes each time. · Try a decongestant nasal spray like oxymetazoline (Afrin). Do not use it for more than 3 days in a row. Using it for more than 3 days can make your congestion worse. When should you call for help? Call your doctor now or seek immediate medical care if:    · You have new or worse swelling or redness in your face or around your eyes.     · You have a new or higher fever.    Watch closely for changes in your health, and be sure to contact your doctor if:    · You have new or worse facial pain.     · The mucus from your nose becomes thicker (like pus) or has new blood in it.     · You are not getting better as expected. Where can you learn more? Go to http://joslyn-josy.info/. Enter D825 in the search box to learn more about \"Sinusitis: Care Instructions. \"  Current as of: October 21, 2018  Content Version: 12.2  © 9946-6614 [employer]. Care instructions adapted under license by [employer] (which disclaims liability or warranty for this information). If you have questions about a medical condition or this instruction, always ask your healthcare professional. [place] [address] any warranty or liability for your use of this information.

## 2020-03-12 NOTE — PROGRESS NOTES
This note will not be viewable in [address] Erich Marroquin is a 71 y.o. female and presents with Cough and Nasal Congestion  . Subjective:  Mrs. Juwan Reyes presents the office today with 2 to 2-1/2 weeks worth of an upper respiratory infection with the development of sinus congestion maxillary discomfort and yellowish-green postnasal drainage. She has had some facial discomfort but no severe headaches. She denies any fevers or chills and has had no significant cough or shortness of breath. Patient Active Problem List   Diagnosis Code    Allergic rhinitis J30.9    Osteopenia M85.80    Tremor of left hand R25.1     No past surgical history on file. No Known Allergies  Current Outpatient Medications   Medication Sig Dispense Refill    cefdinir (OMNICEF) 300 mg capsule Take 1 Cap by mouth two (2) times a day for 10 days. 20 Cap 0    calcium-cholecalciferol, D3, (CALTRATE 600+D) tablet Take 1 Tab by mouth daily.  cranberry extract 450 mg tab tablet Take 450 mg by mouth.  ascorbic acid, vitamin C, (VITAMIN C) 500 mg tablet Take 1,000 mg by mouth daily.  multivitamin (ONE A DAY) tablet Take 1 Tab by mouth daily.        Social History     Socioeconomic History    Marital status:      Spouse name: Not on file    Number of children: Not on file    Years of education: Not on file    Highest education level: Not on file   Tobacco Use    Smoking status: Never Smoker    Smokeless tobacco: Never Used   Substance and Sexual Activity    Alcohol use: No    Drug use: No     Family History   Problem Relation Age of Onset    Elevated Lipids Mother     Cancer Mother         breast    Hypertension Father     Diabetes Father     Macular Degen Father     Heart Disease Father        Review of Systems  Constitutional: negative for fevers, chills, anorexia and weight loss  Eyes:   negative for visual disturbance and irritation  ENT:   Positive for sinus congestion, maxillary discomfort, purulent psotnasal draingage and throat irritation  Respiratory:  negative for cough, hemoptysis, dyspnea,wheezing  CV:   negative for chest pain, palpitations, lower extremity edema  GI:   negative for nausea, vomiting, diarrhea, abdominal pain,melena  Integumentary: negative for rash and pruritus  Neurological:  negative for headaches, dizziness, vertigo, memory problems and gait       Objective:  Visit Vitals  /74 (BP 1 Location: Left arm, BP Patient Position: Sitting)   Pulse 85   Temp 97.5 °F (36.4 °C) (Oral)   Resp 20   Ht 5' 3.25\" (1.607 m)   Wt 154 lb 9.6 oz (70.1 kg)   SpO2 97%   BMI 27.17 kg/m²     Body mass index is 27.17 kg/m². Physical Exam:   General appearance - alert, well appearing, and in no distress  Mental status - alert, oriented to person, place, and time  EYE-KIMBER, EOMI, sclera clear. No lid swelling or purulent drainage  ENT- TM's clear without A/F level. Pharynx slightly erythematous with drainage noted  Nose - normal and patent, no erythema,  Neck - supple, no significant adenopathy   Chest - clear to auscultation, no wheezes, rales or rhonchi, symmetric air entry   Heart - normal rate, regular rhythm, normal S1, S2, no murmurs, rubs, clicks or gallops   Skin-No rash appreciated  Neuro -alert, oriented, normal speech, no focal findings. Assessment/Plan:  Diagnoses and all orders for this visit:    Acute maxillary sinusitis, recurrence not specified  -     cefdinir (OMNICEF) 300 mg capsule; Take 1 Cap by mouth two (2) times a day for 10 days. , Normal, Disp-20 Cap, R-0        Other Instructions:  Mucinex as directed    Increase po fluids    Follow-up and Dispositions    · Return if symptoms worsen or fail to improve. Please note that this dictation was completed with [employer] the [employer] voice recognition software. Quite often unanticipated grammatical, syntax, homophones, and other interpretive errors are inadvertently transcribed by the computer software.   Please disregard these errors. Please excuse any errors that have escaped final proofreading. I have reviewed with the patient details of the assessment and plan and all questions were answered. Relevent patient education was performed. An After Visit Summary was printed and given to the patient.     Max Peacock MD

## 2020-03-12 NOTE — PROGRESS NOTES
Reviewed record in preparation for visit and have obtained necessary documentation. Identified pt with two pt identifiers(name and ). Chief Complaint   Patient presents with    Cough    Nasal Congestion        Coordination of Care Questionnaire:  :     1) Have you been to an emergency room, urgent care clinic since your last visit? No     Hospitalized since your last visit? No               2) Have you seen or consulted any other health care providers outside of [address] [place] [address] since your last visit?   No

## 2020-06-29 ENCOUNTER — TELEPHONE (OUTPATIENT)
Dept: INTERNAL MEDICINE CLINIC | Age: 65
End: 2020-06-29

## 2020-06-29 NOTE — TELEPHONE ENCOUNTER
Patient has been dizzy since yesterday. BP has been normal. She would like to come in to be evaluated and to have her blood sugar checked.

## 2020-06-30 ENCOUNTER — TELEPHONE (OUTPATIENT)
Dept: INTERNAL MEDICINE CLINIC | Age: 65
End: 2020-06-30

## 2020-06-30 NOTE — TELEPHONE ENCOUNTER
Patient states she was seen at [facility] and was diagnosed with a UTI. .     States she would like to see Dr Stormy Butts because she feels they gave her the wrong antibiotic      [phone]

## 2020-07-01 NOTE — TELEPHONE ENCOUNTER
These check to see what antibiotic they gave her. Also we will need to have the results of any urine culture that was done in the emergency room before we can make any changes in her antibiotic.

## 2020-07-02 NOTE — TELEPHONE ENCOUNTER
Patient was put on Keflex Monday at [facility] ER for UTI. She said she is feeling better each day. Would like to come in to give a follow up urine next week to make sure shes clear.

## 2020-07-02 NOTE — TELEPHONE ENCOUNTER
Needs to be off of the antibiotic greater than 48 hours prior to giving us a follow-up urine specimen

## 2020-07-17 ENCOUNTER — TELEPHONE (OUTPATIENT)
Dept: INTERNAL MEDICINE CLINIC | Age: 65
End: 2020-07-17

## 2020-07-17 NOTE — TELEPHONE ENCOUNTER
Patient states her bladder infection has not improved. States she went to [facility] urgent [facility] in [place]. States the medication they gave her did not work.

## 2022-01-11 ENCOUNTER — TELEPHONE (OUTPATIENT)
Dept: INTERNAL MEDICINE CLINIC | Age: 67
End: 2022-01-11

## 2022-01-11 ENCOUNTER — LAB ONLY (OUTPATIENT)
Dept: INTERNAL MEDICINE CLINIC | Age: 67
End: 2022-01-11

## 2022-01-11 DIAGNOSIS — N39.0 URINARY TRACT INFECTION WITHOUT HEMATURIA, SITE UNSPECIFIED: Primary | ICD-10-CM

## 2022-01-11 NOTE — TELEPHONE ENCOUNTER
Patient states she has a uti. She has urgency and pressure when she urinates. She states she has not been here in a while because when she calls it  would go to voicemail and when she got an appt it was too far out. She has been to [facility] for this before and would like it taken care of here first if possible. Please advise.      [phone]

## 2022-01-12 LAB
APPEARANCE UR: ABNORMAL
BACTERIA URNS QL MICRO: ABNORMAL /HPF
BILIRUB UR QL: NEGATIVE
COLOR UR: ABNORMAL
EPITH CASTS URNS QL MICRO: ABNORMAL /LPF
GLUCOSE UR STRIP.AUTO-MCNC: NEGATIVE MG/DL
HGB UR QL STRIP: ABNORMAL
KETONES UR QL STRIP.AUTO: NEGATIVE MG/DL
LEUKOCYTE ESTERASE UR QL STRIP.AUTO: ABNORMAL
NITRITE UR QL STRIP.AUTO: NEGATIVE
PH UR STRIP: 6 [PH] (ref 5–8)
PROT UR STRIP-MCNC: NEGATIVE MG/DL
RBC #/AREA URNS HPF: ABNORMAL /HPF (ref 0–5)
SP GR UR REFRACTOMETRY: 1.01 (ref 1–1.03)
UROBILINOGEN UR QL STRIP.AUTO: 0.2 EU/DL (ref 0.2–1)
WBC URNS QL MICRO: >100 /HPF (ref 0–4)

## 2022-01-14 ENCOUNTER — TELEPHONE (OUTPATIENT)
Dept: INTERNAL MEDICINE CLINIC | Age: 67
End: 2022-01-14

## 2022-01-14 LAB
BACTERIA SPEC CULT: ABNORMAL
BACTERIA SPEC CULT: ABNORMAL
CC UR VC: ABNORMAL
SERVICE CMNT-IMP: ABNORMAL

## 2022-01-14 RX ORDER — SULFAMETHOXAZOLE AND TRIMETHOPRIM 800; 160 MG/1; MG/1
1 TABLET ORAL 2 TIMES DAILY
Qty: 14 TABLET | Refills: 0 | Status: SHIPPED | OUTPATIENT
Start: 2022-01-14 | End: 2022-05-10

## 2022-01-14 NOTE — TELEPHONE ENCOUNTER
----- Message from Claudette Preciado MD sent at 1/14/2022 10:18 AM EST -----  Urine culture positive for infection with 2 organisms. Rx Bactrim DS twice daily #14

## 2022-01-14 NOTE — TELEPHONE ENCOUNTER
Patient advised of lab results- please send pended Rx to [facility]:  Requested Prescriptions     Pending Prescriptions Disp Refills    trimethoprim-sulfamethoxazole (BACTRIM DS, SEPTRA DS) 160-800 mg per tablet 14 Tablet 0     Sig: Take 1 Tablet by mouth two (2) times a day.

## 2022-03-18 PROBLEM — M85.80 OSTEOPENIA: Status: ACTIVE | Noted: 2017-09-20

## 2022-03-19 PROBLEM — R25.1 TREMOR OF LEFT HAND: Status: ACTIVE | Noted: 2019-11-08

## 2022-03-19 PROBLEM — J30.9 ALLERGIC RHINITIS: Status: ACTIVE | Noted: 2017-09-20

## 2022-05-05 NOTE — PROGRESS NOTES
Subjective:     Chief Complaint   Patient presents with   [address]       Bridgette Clark is a 78 y.o. F.  She has not been seen in this clinic in a couple years. Her previous primary care provider was Dr. Narcisa Rush, with her last appointment being in March 2020. She had complained at the time of a cough with nasal congestion. Physical examination at the time had been unremarkable. She was felt to have acute maxillary sinusitis and was given a prescription for cefdinir. Today, the patient comes in for establishment and a request for laboratory studies. Since her last visit here, she has been diagnosed with Parkinson's disease and continues to be followed by a neurologist for this, though she does not currently use any medications for this. She says that the main symptom she has related to this is her tremor, which does not overly bother her, and she wishes to avoid medications as much as possible. She has a history of eczema, for which she has seen a number of dermatologists. She had apparently been offered Dupixent but did not want to start this; she instead is now seeing a homeopathic provider who has advised her to obtain numerous labs, notably including Vitamin D, TSH, and ferritin levels. She is using a homeopathic remedy for this at the moment but is not using other medications otherwise. She gets much of her preventive care at [facility]. She is noted to have a history of osteopenia but says that her last DEXA last year was stable; I do not see a FRAX score. She also notes that her last mammogram and pap smear were both done very recently there as well. She is overdue for screening colonoscopy but requests a Cologuard as her  relies upon her and she does not have another . She denies any family history of colon cancer or any active symptoms of hematochezia, melena, abdominal pain, or weight loss. She declines vaccinations today.  Her ROS is otherwise negative. Physical examination is notable for eczematous rash on her extensor surfaces but otherwise negative. There is a prominent pill rolling tremor in the left hand. No bradykinesia or cogwheeling is noted. Gait is normal.    Past Medical History:  Past Medical History:   Diagnosis Date    Allergic rhinitis 9/20/2017    Eczema 2019    Prev numerous dermatology referrals; currently under care with homeopathy    Osteopenia 9/20/2017    Parkinson's disease ([facility] [address] 2019    L handed tremor; no Rx as of 5/22; Neurology Following    Vitamin D deficiency        Past Surgical Histor:  History reviewed. No pertinent surgical history. Allergies:  No Known Allergies    Medications:  Current Outpatient Medications   Medication Sig Dispense Refill    calcium-cholecalciferol, D3, (CALTRATE 600+D) tablet Take 1 Tab by mouth daily.  cranberry extract 450 mg tab tablet Take 450 mg by mouth.  ascorbic acid, vitamin C, (VITAMIN C) 500 mg tablet Take 1,000 mg by mouth daily.  multivitamin (ONE A DAY) tablet Take 1 Tab by mouth daily.          Social History:  Social History     Socioeconomic History    Marital status:    Tobacco Use    Smoking status: Never Smoker    Smokeless tobacco: Never Used   Substance and Sexual Activity    Alcohol use: No    Drug use: No       Family History:  Family History   Problem Relation Age of Onset    Elevated Lipids Mother     Cancer Mother         breast    Hypertension Father     Diabetes Father     Macular Degen Father     Heart Disease Father        Immunizations:  Immunization History   Administered Date(s) Administered    Influenza Vaccine 10/01/2018    Influenza Vaccine (Quad) PF (>6 Mo Flulaval, Fluarix, and >3 Yrs Afluria, Fluzone 83685) 10/18/2016, 10/20/2017    Influenza Vaccine PF 10/26/2018        Healthcare Maintenance:  Health Maintenance   Topic Date Due    Hepatitis C Screening  Never done    Depression Screen  Never done    COVID-19 Vaccine (1) Never done    DTaP/Tdap/Td series (1 - Tdap) Never done    Lipid Screen  Never done    Colorectal Cancer Screening Combo  Never done    Shingrix Vaccine Age 50> (1 of 2) Never done    Breast Cancer Screen Mammogram  Never done    Bone Densitometry (Dexa) Screening  Never done    Pneumococcal 65+ years (1 - PCV) Never done    Flu Vaccine (Season Ended) 09/01/2022    Medicare Yearly Exam  05/11/2023        Review of Systems:  ROS:  Review of Systems   Constitutional: Negative. HENT: Negative. Eyes: Negative. Respiratory: Negative. Cardiovascular: Negative. Gastrointestinal: Negative. Genitourinary: Negative. Musculoskeletal: Negative. Skin: Positive for itching and rash. Neurological: Negative. Endo/Heme/Allergies: Negative. Psychiatric/Behavioral: Negative. ROS otherwise negative      Objective:     Vital Signs:  Visit Vitals  /74 (BP 1 Location: Right arm, BP Patient Position: Sitting, BP Cuff Size: Large adult)   Pulse 86   Temp 98.4 °F (36.9 °C) (Oral)   Resp 20   Ht 5' 3\" (1.6 m)   Wt 144 lb 12.8 oz (65.7 kg)   SpO2 97%   BMI 25.65 kg/m²       BMI:  Body mass index is 25.65 kg/m². Physical Examination:  Physical Exam  Constitutional:       Appearance: Normal appearance. She is normal weight. HENT:      Head: Normocephalic and atraumatic. Right Ear: External ear normal.      Left Ear: External ear normal.      Nose: Nose normal.      Mouth/Throat:      Mouth: Mucous membranes are moist.      Pharynx: Oropharynx is clear. No oropharyngeal exudate or posterior oropharyngeal erythema. Cardiovascular:      Rate and Rhythm: Normal rate and regular rhythm. Pulses: Normal pulses. Heart sounds: Normal heart sounds. No murmur heard. No friction rub. No gallop. Pulmonary:      Effort: Pulmonary effort is normal. No respiratory distress. Breath sounds: Normal breath sounds. No wheezing, rhonchi or rales.    Abdominal:      General: Abdomen is flat. Bowel sounds are normal. There is no distension. Palpations: Abdomen is soft. Tenderness: There is no abdominal tenderness. There is no guarding. Musculoskeletal:         General: No swelling, tenderness or deformity. Normal range of motion. Cervical back: Normal range of motion and neck supple. No rigidity or tenderness. Skin:     General: Skin is warm and dry. Findings: Rash present. No erythema or lesion. Neurological:      General: No focal deficit present. Mental Status: She is alert and oriented to person, place, and time. Mental status is at baseline. Sensory: No sensory deficit. Motor: No weakness.       Gait: Gait normal.      Deep Tendon Reflexes: Reflexes normal.   Psychiatric:         Mood and Affect: Mood normal.         Behavior: Behavior normal.         Judgment: Judgment normal.          Physical exam otherwise negative    Diagnostic Testing:    Laboratory Studies:  Lab Only on 01/11/2022   Component Date Value Ref Range Status    Special Requests: 01/11/2022 NO SPECIAL REQUESTS    Final    Fenwick Count 01/11/2022     Final                    Value:>100,000  COLONIES/mL      Culture result: 01/11/2022 STAPHYLOCOCCUS AUREUS*   Final    Culture result: 01/11/2022 PROTEUS MIRABILIS (10,000 COL/ML)*   Final    Color 01/11/2022 YELLOW/STRAW    Final    Color Reference Range: Straw, Yellow or Dark Yellow    Appearance 01/11/2022 TURBID* CLEAR   Final    Specific gravity 01/11/2022 1.014  1.003 - 1.030   Final    pH (UA) 01/11/2022 6.0  5.0 - 8.0   Final    Protein 01/11/2022 Negative  Negative mg/dL Final    Glucose 01/11/2022 Negative  Negative mg/dL Final    Ketone 01/11/2022 Negative  Negative mg/dL Final    Bilirubin 01/11/2022 Negative  Negative   Final    Blood 01/11/2022 SMALL* Negative   Final    Urobilinogen 01/11/2022 0.2  0.2 - 1.0 EU/dL Final    Nitrites 01/11/2022 Negative  Negative   Final    Leukocyte Esterase 01/11/2022 LARGE* Negative   Final    WBC 01/11/2022 >100* 0 - 4 /hpf Final    RBC 01/11/2022 0-5  0 - 5 /hpf Final    Epithelial cells 01/11/2022 FEW  FEW /lpf Final    Bacteria 01/11/2022 1+* Negative /hpf Final         Radiographic Studies:  XR Results (most recent):  No results found for this or any previous visit. MELIDA Results (most recent):  No results found for this or any previous visit. CT Results (most recent):  No results found for this or any previous visit. DEXA Results (most recent):  No results found for this or any previous visit. MRI Results (most recent):  Results from [place] encounter on 02/14/12    MRI ANKLE RIGHT WITHOUT CONTRAST    Narrative  **Final Report**      ICD Codes / Adm. Diagnosis: 845.00  727.06 / SPRAIN OF ANKLE, UNSPECIFIED S  TENOSYNOVITIS OF FOOT AND AN  Examination:  MR ANKLE MRI WO CON RT  - [id number] - Feb 14 2012  2:35PM  Accession No:  [id number]  Reason:  Pain in limb, tenosynovitis of ankle, sprain of ankle      REPORT:  INDICATION: Right ankle pain. EXAM: MRI right ankle without contrast.    Comparisons: None    Sequences include sagittal T1, sagittal, coronal, and axial T2 fat  saturation, axial proton density with fat saturation. Sagittal  fat-suppressed spoiled gradient echo images. Findings: Examination of the marrow demonstrates no evidence of stress  reaction, fracture or marrow replacing process. Alignment of the ankle is  normal. The talar dome is intact. There are no discrete cartilage defect. No  evidence of coalition. The sinus tarsi fat is normal. The plantar fascia is  not thickened or inflamed. There is high-grade partial thickness tearing of  the peroneus brevis and distal to the lateral malleolus. The peroneus longus  is thickened proximal to the lateral malleolus compatible with tendinopathy. Remaining ankle tendons are intact. Ankle ligaments are intact. No discrete  mass lesions. IMPRESSION:  1.  High-grade partial thickness tear of the peroneus brevis distal to the  lateral malleolus  2. Significant tendinopathy of the peroneus longus        Signing/Reading Doctor: Ej Salgado ([id number])  Approved: Ej Salgado ([id number])  02/14/2012       Assessment/Plan:       ICD-10-CM ICD-9-CM    1. Routine adult health maintenance  Z00.00 V70.0 HEMOGLOBIN A1C WITH EAG      CBC WITH AUTOMATED DIFF      METABOLIC PANEL, COMPREHENSIVE      LIPID PANEL      HEMOGLOBIN A1C WITH EAG      CBC WITH AUTOMATED DIFF      METABOLIC PANEL, COMPREHENSIVE      LIPID PANEL   2. Need for hepatitis C screening test  Z11.59 V73.89 HEPATITIS C AB      HEPATITIS C AB   3. Screen for colon cancer  Z12.11 V76.51 COLOGUARD TEST (FECAL DNA COLORECTAL CANCER SCREENING)   4. Parkinson's disease ([facility] [address]  G20 332.0    5. Osteopenia, unspecified location  M85.80 733.90    6. Eczema, unspecified type  L30.9 692.9 FERRITIN      TSH 3RD GENERATION      FERRITIN      TSH 3RD GENERATION   7. Pruritus, unspecified   L29.9 698.9 FERRITIN      TSH 3RD GENERATION      FERRITIN      TSH 3RD GENERATION   8. Medicare annual wellness visit, subsequent  Z00.00 V70.0    9. Vitamin D deficiency  E55.9 268.9 VITAMIN D, 25 HYDROXY      VITAMIN D, 25 HYDROXY          HCM:  - labs as per above  - Discussed TDAP, Prevnar-20, Shingrix vaccines; patient precontemplative  - HCV screening as per above  - Cologuard today  - Pt reports UTD on MMG, Pap, DEXA-- advised to bring in records for incorporation here    Parkinson's Disease:  - Tremor noted on examination as per above  - Following with Neurology; no active Rx at the moment  - Continue to monitor    Osteopenia:  - unclear FRAX risk  - Advised patient to get records from [facility] to bring here for review  - Consider bisphosphonate vs. Prolia if meets WHO/NOF criteria    Eczema:  - Labs as per above  - I am not convinced that homeopathy will prove successful.  Awaiting labs; consider referral back to Derm fo consideration of Dupixent. Follow-up and Dispositions    · Return in about 1 year (around 5/10/2023), or if symptoms worsen or fail to improve. Génesis Jackson MD    Please note that this dictation was completed with MogiMe, the computer voice recognition software. Quite often unanticipated grammatical, syntax, homophones, and other interpretive errors are inadvertently transcribed by the computer software. Please disregard these errors. Please excuse any errors that have escaped final proofreading. This is the Subsequent Medicare Annual Wellness Exam, performed 12 months or more after the Initial AWV or the last Subsequent AWV    I have reviewed the patient's medical history in detail and updated the computerized patient record. Assessment/Plan   Education and counseling provided:  Are appropriate based on today's review and evaluation  Cardiovascular screening blood test  Diabetes screening test    1. Routine adult health maintenance  -     HEMOGLOBIN A1C WITH EAG; Future  -     CBC WITH AUTOMATED DIFF; Future  -     METABOLIC PANEL, COMPREHENSIVE; Future  -     LIPID PANEL; Future  2. Need for hepatitis C screening test  -     HEPATITIS C AB; Future  3. Screen for colon cancer  -     COLOGUARD TEST (FECAL DNA COLORECTAL CANCER SCREENING); Future  4. Parkinson's disease ([facility] [address]  5. Osteopenia, unspecified location  6. Eczema, unspecified type  -     FERRITIN; Future  -     TSH 3RD GENERATION; Future  7. Pruritus, unspecified   -     FERRITIN; Future  -     TSH 3RD GENERATION; Future  8. Medicare annual wellness visit, subsequent  9.  Vitamin D deficiency  -     VITAMIN D, 25 HYDROXY; Future       Depression Risk Factor Screening     3 most recent PHQ Screens 4/15/2019   Little interest or pleasure in doing things Not at all   Feeling down, depressed, irritable, or hopeless Not at all   Total Score PHQ 2 0       Alcohol & Drug Abuse Risk Screen    Do you average more than 1 drink per night or more than 7 drinks a week:  No    On any one occasion in the past three months have you have had more than 3 drinks containing alcohol:  No          Functional Ability and Level of Safety    Hearing: Hearing is good. Activities of Daily Living: The home contains: no safety equipment. Patient does total self care      Ambulation: with no difficulty     Fall Risk:  Fall Risk Assessment, last 12 mths 11/8/2019   Able to walk? Yes   Fall in past 12 months? No      Abuse Screen:  Patient is not abused       Cognitive Screening    Has your family/caregiver stated any concerns about your memory: no     Cognitive Screening: Normal - Verbal Fluency Test    Health Maintenance Due     Health Maintenance Due   Topic Date Due    Hepatitis C Screening  Never done    Depression Screen  Never done    COVID-19 Vaccine (1) Never done    DTaP/Tdap/Td series (1 - Tdap) Never done    Lipid Screen  Never done    Colorectal Cancer Screening Combo  Never done    Shingrix Vaccine Age 50> (1 of 2) Never done    Breast Cancer Screen Mammogram  Never done    Bone Densitometry (Dexa) Screening  Never done    Pneumococcal 65+ years (1 - PCV) Never done       Patient Care Team   Patient Care Team:  Chen Johnson MD as PCP - General (Internal Medicine Physician)  Chen Johnson MD as PCP [facility] Empaneled Provider    History     Patient Active Problem List   Diagnosis Code    Allergic rhinitis J30.9    Osteopenia M85.80    Tremor of left hand R25.1    Parkinson's disease ([address] G20    Eczema L30.9    Vitamin D deficiency E55.9     Past Medical History:   Diagnosis Date    Allergic rhinitis 9/20/2017    Eczema 2019    Prev numerous dermatology referrals; currently under care with homeopathy    Osteopenia 9/20/2017    Parkinson's disease ([address] 2019    L handed tremor; no Rx as of 5/22; Neurology Following    Vitamin D deficiency       History reviewed. No pertinent surgical history.   Current Outpatient Medications   Medication Sig Dispense Refill    calcium-cholecalciferol, D3, (CALTRATE 600+D) tablet Take 1 Tab by mouth daily.  cranberry extract 450 mg tab tablet Take 450 mg by mouth.  ascorbic acid, vitamin C, (VITAMIN C) 500 mg tablet Take 1,000 mg by mouth daily.  multivitamin (ONE A DAY) tablet Take 1 Tab by mouth daily.        No Known Allergies    Family History   Problem Relation Age of Onset    Elevated Lipids Mother     Cancer Mother         breast    Hypertension Father     Diabetes Father     Macular Degen Father     Heart Disease Father      Social History     Tobacco Use    Smoking status: Never Smoker    Smokeless tobacco: Never Used   Substance Use Topics    Alcohol use: No         Aime Rust MD

## 2022-05-10 ENCOUNTER — OFFICE VISIT (OUTPATIENT)
Dept: INTERNAL MEDICINE CLINIC | Age: 67
End: 2022-05-10
Payer: MEDICARE

## 2022-05-10 VITALS
SYSTOLIC BLOOD PRESSURE: 131 MMHG | WEIGHT: 144.8 LBS | BODY MASS INDEX: 25.66 KG/M2 | DIASTOLIC BLOOD PRESSURE: 74 MMHG | TEMPERATURE: 98.4 F | HEART RATE: 86 BPM | OXYGEN SATURATION: 97 % | HEIGHT: 63 IN | RESPIRATION RATE: 20 BRPM

## 2022-05-10 DIAGNOSIS — L30.9 ECZEMA, UNSPECIFIED TYPE: ICD-10-CM

## 2022-05-10 DIAGNOSIS — M85.80 OSTEOPENIA, UNSPECIFIED LOCATION: ICD-10-CM

## 2022-05-10 DIAGNOSIS — Z00.00 ROUTINE ADULT HEALTH MAINTENANCE: Primary | ICD-10-CM

## 2022-05-10 DIAGNOSIS — Z11.59 NEED FOR HEPATITIS C SCREENING TEST: ICD-10-CM

## 2022-05-10 DIAGNOSIS — Z00.00 MEDICARE ANNUAL WELLNESS VISIT, SUBSEQUENT: ICD-10-CM

## 2022-05-10 DIAGNOSIS — Z12.11 SCREEN FOR COLON CANCER: ICD-10-CM

## 2022-05-10 DIAGNOSIS — L29.9 PRURITUS, UNSPECIFIED: ICD-10-CM

## 2022-05-10 DIAGNOSIS — G20 PARKINSON'S DISEASE (HCC): ICD-10-CM

## 2022-05-10 DIAGNOSIS — E55.9 VITAMIN D DEFICIENCY: ICD-10-CM

## 2022-05-10 PROBLEM — G20.A1 PARKINSON'S DISEASE: Status: ACTIVE | Noted: 2019-01-01

## 2022-05-10 PROCEDURE — G0439 PPPS, SUBSEQ VISIT: HCPCS | Performed by: INTERNAL MEDICINE

## 2022-05-10 PROCEDURE — 99213 OFFICE O/P EST LOW 20 MIN: CPT | Performed by: INTERNAL MEDICINE

## 2022-05-10 NOTE — PATIENT INSTRUCTIONS
I have included information about the Prevnar (antipneumococcal) vaccination below. Please consider getting this to prevent invasive, high-risk pneumonia. Please bring in your records of your bone density test, mammogram, and other testing done by [facility] as soon as you can. Medicare Wellness Visit, Female     The best way to live healthy is to have a lifestyle where you eat a well-balanced diet, exercise regularly, limit alcohol use, and quit all forms of tobacco/nicotine, if applicable. Regular preventive services are another way to keep healthy. Preventive services (vaccines, screening tests, monitoring & exams) can help personalize your care plan, which helps you manage your own care. Screening tests can find health problems at the earliest stages, when they are easiest to treat. [facility] follows the current, evidence-based guidelines published by the [facility] [place] (USPSTF) when recommending preventive services for our patients. Because we follow these guidelines, sometimes recommendations change over time as research supports it. (For example, mammograms used to be recommended annually. Even though Medicare will still pay for an annual mammogram, the newer guidelines recommend a mammogram every two years for women of average risk). Of course, you and your doctor may decide to screen more often for some diseases, based on your risk and your co-morbidities (chronic disease you are already diagnosed with). Preventive services for you include:  - Medicare offers their members a free annual wellness visit, which is time for you and your primary care provider to discuss and plan for your preventive service needs. Take advantage of this benefit every year!  -All adults over the age of 72 should receive the recommended pneumonia vaccines.  Current USPSTF guidelines recommend a series of two vaccines for the best pneumonia protection.   -All adults should have a flu vaccine yearly and a tetanus vaccine every 10 years.   -All adults age 48 and older should receive the shingles vaccines (series of two vaccines). -All adults age 38-68 who are overweight should have a diabetes screening test once every three years.   -All adults born between [birth year] and 1966 should be screened once for Hepatitis C.  -Other screening tests and preventive services for persons with diabetes include: an eye exam to screen for diabetic retinopathy, a kidney function test, a foot exam, and stricter control over your cholesterol.   -Cardiovascular screening for adults with routine risk involves an electrocardiogram (ECG) at intervals determined by your doctor.   -Colorectal cancer screenings should be done for adults age 54-65 with no increased risk factors for colorectal cancer. There are a number of acceptable methods of screening for this type of cancer. Each test has its own benefits and drawbacks. Discuss with your doctor what is most appropriate for you during your annual wellness visit. The different tests include: colonoscopy (considered the best screening method), a fecal occult blood test, a fecal DNA test, and sigmoidoscopy.    -A bone mass density test is recommended when a woman turns 65 to screen for osteoporosis. This test is only recommended one time, as a screening. Some providers will use this same test as a disease monitoring tool if you already have osteoporosis. -Breast cancer screenings are recommended every other year for women of normal risk, age 54-69.  -Cervical cancer screenings for women over age 72 are only recommended with certain risk factors.      Here is a list of your current Health Maintenance items (your personalized list of preventive services) with a due date:  Health Maintenance Due   Topic Date Due    Hepatitis C Test  Never done    Depresssion Screening  Never done    COVID-19 Vaccine (1) Never done    DTaP/Tdap/Td (1 - Tdap) Never done    Cholesterol Test   Never done    Colorectal Screening  Never done    Shingles Vaccine (1 of 2) Never done    Mammogram  Never done    Bone Mineral Density   Never done    Pneumococcal Vaccine (1 - PCV) Never done

## 2022-05-10 NOTE — PROGRESS NOTES
Chief Complaint   Patient presents with    Establish Care     Visit Vitals  /74 (BP 1 Location: Right arm, BP Patient Position: Sitting, BP Cuff Size: Large adult)   Pulse 86   Temp 98.4 °F (36.9 °C) (Oral)   Resp 20   Ht 5' 3\" (1.6 m)   Wt 144 lb 12.8 oz (65.7 kg)   SpO2 97%   BMI 25.65 kg/m²         1. \"Have you been to the ER, urgent care clinic since your last visit? Hospitalized since your last visit? \" No    2. \"Have you seen or consulted any other health care providers outside of the [address] [place] [address] since your last visit? \" No     3. For patients aged 39-70: Has the patient had a colonoscopy / FIT/ Cologuard? No      If the patient is female:    4. For patients aged 41-77: Has the patient had a mammogram within the past 2 years? No      5. For patients aged 21-65: Has the patient had a pap smear?  No

## 2022-05-11 DIAGNOSIS — Z12.11 SPECIAL SCREENING FOR MALIGNANT NEOPLASMS, COLON: Primary | ICD-10-CM

## 2022-05-11 LAB
25(OH)D3 SERPL-MCNC: 46.9 NG/ML (ref 30–100)
ALBUMIN SERPL-MCNC: 4.2 G/DL (ref 3.5–5)
ALBUMIN/GLOB SERPL: 1.2 {RATIO} (ref 1.1–2.2)
ALP SERPL-CCNC: 137 U/L (ref 45–117)
ALT SERPL-CCNC: 84 U/L (ref 12–78)
ANION GAP SERPL CALC-SCNC: 6 MMOL/L (ref 5–15)
AST SERPL-CCNC: 71 U/L (ref 15–37)
BASOPHILS # BLD: 0.1 K/UL (ref 0–0.1)
BASOPHILS NFR BLD: 1 % (ref 0–1)
BILIRUB SERPL-MCNC: 0.4 MG/DL (ref 0.2–1)
BUN SERPL-MCNC: 12 MG/DL (ref 6–20)
BUN/CREAT SERPL: 18 (ref 12–20)
CALCIUM SERPL-MCNC: 9.9 MG/DL (ref 8.5–10.1)
CHLORIDE SERPL-SCNC: 105 MMOL/L (ref 97–108)
CHOLEST SERPL-MCNC: 217 MG/DL
CO2 SERPL-SCNC: 26 MMOL/L (ref 21–32)
CREAT SERPL-MCNC: 0.68 MG/DL (ref 0.55–1.02)
DIFFERENTIAL METHOD BLD: NORMAL
EOSINOPHIL # BLD: 0.3 K/UL (ref 0–0.4)
EOSINOPHIL NFR BLD: 5 % (ref 0–7)
ERYTHROCYTE [DISTWIDTH] IN BLOOD BY AUTOMATED COUNT: 13.1 % (ref 11.5–14.5)
EST. AVERAGE GLUCOSE BLD GHB EST-MCNC: 126 MG/DL
FERRITIN SERPL-MCNC: 88 NG/ML (ref 8–252)
GLOBULIN SER CALC-MCNC: 3.4 G/DL (ref 2–4)
GLUCOSE SERPL-MCNC: 124 MG/DL (ref 65–100)
HBA1C MFR BLD: 6 % (ref 4–5.6)
HCT VFR BLD AUTO: 41.3 % (ref 35–47)
HCV AB SERPL QL IA: NONREACTIVE
HDLC SERPL-MCNC: 74 MG/DL
HDLC SERPL: 2.9 {RATIO} (ref 0–5)
HGB BLD-MCNC: 13.6 G/DL (ref 11.5–16)
IMM GRANULOCYTES # BLD AUTO: 0 K/UL (ref 0–0.04)
IMM GRANULOCYTES NFR BLD AUTO: 0 % (ref 0–0.5)
LDLC SERPL CALC-MCNC: 122.8 MG/DL (ref 0–100)
LYMPHOCYTES # BLD: 1 K/UL (ref 0.8–3.5)
LYMPHOCYTES NFR BLD: 18 % (ref 12–49)
MCH RBC QN AUTO: 29.4 PG (ref 26–34)
MCHC RBC AUTO-ENTMCNC: 32.9 G/DL (ref 30–36.5)
MCV RBC AUTO: 89.4 FL (ref 80–99)
MONOCYTES # BLD: 0.4 K/UL (ref 0–1)
MONOCYTES NFR BLD: 7 % (ref 5–13)
NEUTS SEG # BLD: 4 K/UL (ref 1.8–8)
NEUTS SEG NFR BLD: 69 % (ref 32–75)
NRBC # BLD: 0 K/UL (ref 0–0.01)
NRBC BLD-RTO: 0 PER 100 WBC
PLATELET # BLD AUTO: 251 K/UL (ref 150–400)
PMV BLD AUTO: 10.9 FL (ref 8.9–12.9)
POTASSIUM SERPL-SCNC: 4.1 MMOL/L (ref 3.5–5.1)
PROT SERPL-MCNC: 7.6 G/DL (ref 6.4–8.2)
RBC # BLD AUTO: 4.62 M/UL (ref 3.8–5.2)
SODIUM SERPL-SCNC: 137 MMOL/L (ref 136–145)
TRIGL SERPL-MCNC: 101 MG/DL (ref ?–150)
TSH SERPL DL<=0.05 MIU/L-ACNC: 1.11 UIU/ML (ref 0.36–3.74)
VLDLC SERPL CALC-MCNC: 20.2 MG/DL
WBC # BLD AUTO: 5.8 K/UL (ref 3.6–11)

## 2022-05-13 DIAGNOSIS — Z11.59 ENCOUNTER FOR SCREENING FOR OTHER VIRAL DISEASES: ICD-10-CM

## 2022-05-13 DIAGNOSIS — R74.01 TRANSAMINITIS: Primary | ICD-10-CM

## 2022-05-13 NOTE — PROGRESS NOTES
ICD-10-CM ICD-9-CM    1. Transaminitis  R74.01 790.4 US ABD LTD      ANTI-NUCLEAR AB BY IFA (RDL)      MITOCHONDRIAL M2 AB      ACTIN (SMOOTH MUSCLE) ANTIBODY      HBV CORE AB, IGG/IGM      HEP B SURFACE AB      HEP B SURFACE AG   2. Encounter for screening for other viral diseases   Z11.59 V73.89 HBV CORE AB, IGG/IGM      HEP B SURFACE AB      HEP B SURFACE AG       Lab Results   Component Value Date/Time    ALT (SGPT) 84 (H) 05/10/2022 12:19 PM    AST (SGOT) 71 (H) 05/10/2022 12:19 PM    Alk.  phosphatase 137 (H) 05/10/2022 12:19 PM    Bilirubin, total 0.4 05/10/2022 12:19 PM

## 2022-05-13 NOTE — PROGRESS NOTES
Please let the patient know that her labs were consistent with pre diabetes, with an elevated A1C and blood glucose. In addition, her cholesterol was elevated. She also had a mild elevation in her liver enzymes. While this was mild, additional evaluation would be warranted. Additional labs and an US will be ordered.

## 2022-05-18 ENCOUNTER — LAB ONLY (OUTPATIENT)
Dept: INTERNAL MEDICINE CLINIC | Age: 67
End: 2022-05-18

## 2022-05-18 DIAGNOSIS — Z11.59 ENCOUNTER FOR SCREENING FOR OTHER VIRAL DISEASES: ICD-10-CM

## 2022-05-18 DIAGNOSIS — R74.01 TRANSAMINITIS: ICD-10-CM

## 2022-05-19 LAB
HBV SURFACE AB SER QL: REACTIVE
HBV SURFACE AB SER-ACNC: 11.4 MIU/ML
HBV SURFACE AG SER QL: 0.38 INDEX
HBV SURFACE AG SER QL: NEGATIVE

## 2022-05-20 ENCOUNTER — HOSPITAL ENCOUNTER (OUTPATIENT)
Dept: ULTRASOUND IMAGING | Age: 67
Discharge: HOME | End: 2022-05-20
Attending: INTERNAL MEDICINE
Payer: MEDICARE

## 2022-05-20 DIAGNOSIS — R74.01 TRANSAMINITIS: ICD-10-CM

## 2022-05-20 LAB
HBV CORE AB SERPL QL IA: NEGATIVE
HBV CORE IGM SERPL QL IA: NEGATIVE
MITOCHONDRIA M2 IGG SER-ACNC: <20 UNITS (ref 0–20)
SMA IGG SER-ACNC: 22 UNITS (ref 0–19)

## 2022-05-20 PROCEDURE — 76705 ECHO EXAM OF ABDOMEN: CPT

## 2022-05-21 NOTE — PROGRESS NOTES
Please let the patient know that her auto antibody panels so far suggest the possibility of autoimmune hepatitis, although they are not conclusive. 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 We are still waiting for one more test to come back. I will refer the patient to gastroenterology for additional evaluation and treatment.

## 2022-05-22 DIAGNOSIS — R74.01 TRANSAMINITIS: Primary | ICD-10-CM

## 2022-05-29 LAB — ANTI-NUCLEAR AB BY IFA (RDL): NEGATIVE

## 2022-05-31 ENCOUNTER — TELEPHONE (OUTPATIENT)
Dept: INTERNAL MEDICINE CLINIC | Age: 67
End: 2022-05-31

## 2022-05-31 NOTE — TELEPHONE ENCOUNTER
Patient called regarding her results from her most recent lab work and imaging. Please call and advise as soon as possible. Patient also wanted to advise Dr. Saurabh Knox that she went to [facility] in [place] on Saturday because she believed that she had a uti. She was experiencing some frequency of urination and pain with urination. Stated she was given macrobid and they did complete a culture which is pending. Records requested.

## 2022-05-31 NOTE — TELEPHONE ENCOUNTER
Please let the patient know that her right upper quadrant ultrasound was effectively unremarkable except for the presence of gallstones. It is not clear that these are contributory to her liver enzyme elevation. More recently, the patient had other laboratory studies obtained to evaluate a finding of transaminitis. As mentioned previously, her liver enzyme elevation was relatively mild. All of the different serologies obtained so far, the only abnormal study was her anti-smooth muscle antibody, which can sometimes be elevated in situations of autoimmune hepatitis, less likely primary biliary cirrhosis. The patient was previously referred to gastroenterology for additional evaluation, and I recommend that she maintain this appointment, although it is possible that no alteration in her management may be warranted by these findings.

## 2022-06-02 DIAGNOSIS — R19.5 POSITIVE COLORECTAL CANCER SCREENING USING COLOGUARD TEST: Primary | ICD-10-CM

## 2022-06-02 NOTE — PROGRESS NOTES
ICD-10-CM ICD-9-CM    1.  Positive colorectal cancer screening using Cologuard test  R19.5 787.7 REFERRAL TO GASTROENTEROLOGY

## 2022-06-09 NOTE — TELEPHONE ENCOUNTER
Patient states she received the Cologuard test results and she was positive. She states she did the test on 5/25/22 and shipped it and on May 25th she went to [facility] for a uti. She is taking Augmentin for the uti. She states she would like an order for a colonoscopy with Dr Ayse Angela and [facility]. Please call when complete.      [phone]

## 2022-08-19 ENCOUNTER — ANESTHESIA EVENT (OUTPATIENT)
Dept: ENDOSCOPY | Age: 67
End: 2022-08-19
Payer: MEDICARE

## 2022-08-19 ENCOUNTER — ANESTHESIA (OUTPATIENT)
Dept: ENDOSCOPY | Age: 67
End: 2022-08-19
Payer: MEDICARE

## 2022-08-19 ENCOUNTER — HOSPITAL ENCOUNTER (OUTPATIENT)
Age: 67
Setting detail: OUTPATIENT SURGERY
Discharge: HOME | End: 2022-08-19
Attending: INTERNAL MEDICINE | Admitting: INTERNAL MEDICINE
Payer: MEDICARE

## 2022-08-19 VITALS
DIASTOLIC BLOOD PRESSURE: 55 MMHG | HEART RATE: 71 BPM | BODY MASS INDEX: 23.05 KG/M2 | SYSTOLIC BLOOD PRESSURE: 132 MMHG | OXYGEN SATURATION: 100 % | TEMPERATURE: 97.9 F | WEIGHT: 135 LBS | RESPIRATION RATE: 15 BRPM | HEIGHT: 64 IN

## 2022-08-19 PROCEDURE — 76040000019: Performed by: INTERNAL MEDICINE

## 2022-08-19 PROCEDURE — 74011250636 HC RX REV CODE- 250/636: Performed by: INTERNAL MEDICINE

## 2022-08-19 PROCEDURE — 2709999900 HC NON-CHARGEABLE SUPPLY: Performed by: INTERNAL MEDICINE

## 2022-08-19 PROCEDURE — 76060000031 HC ANESTHESIA FIRST 0.5 HR: Performed by: INTERNAL MEDICINE

## 2022-08-19 PROCEDURE — 74011250636 HC RX REV CODE- 250/636: Performed by: NURSE ANESTHETIST, CERTIFIED REGISTERED

## 2022-08-19 RX ORDER — DEXTROMETHORPHAN/PSEUDOEPHED 2.5-7.5/.8
1.2 DROPS ORAL
Status: DISCONTINUED | OUTPATIENT
Start: 2022-08-19 | End: 2022-08-19 | Stop reason: HOSPADM

## 2022-08-19 RX ORDER — NALOXONE HYDROCHLORIDE 0.4 MG/ML
0.4 INJECTION, SOLUTION INTRAMUSCULAR; INTRAVENOUS; SUBCUTANEOUS
Status: DISCONTINUED | OUTPATIENT
Start: 2022-08-19 | End: 2022-08-19 | Stop reason: HOSPADM

## 2022-08-19 RX ORDER — PROPOFOL 10 MG/ML
INJECTION, EMULSION INTRAVENOUS AS NEEDED
Status: DISCONTINUED | OUTPATIENT
Start: 2022-08-19 | End: 2022-08-19 | Stop reason: HOSPADM

## 2022-08-19 RX ORDER — SODIUM CHLORIDE 9 MG/ML
100 INJECTION, SOLUTION INTRAVENOUS CONTINUOUS
Status: DISCONTINUED | OUTPATIENT
Start: 2022-08-19 | End: 2022-08-19 | Stop reason: HOSPADM

## 2022-08-19 RX ORDER — SODIUM CHLORIDE 0.9 % (FLUSH) 0.9 %
5-40 SYRINGE (ML) INJECTION EVERY 8 HOURS
Status: DISCONTINUED | OUTPATIENT
Start: 2022-08-19 | End: 2022-08-19 | Stop reason: HOSPADM

## 2022-08-19 RX ORDER — FLUMAZENIL 0.1 MG/ML
0.2 INJECTION INTRAVENOUS
Status: DISCONTINUED | OUTPATIENT
Start: 2022-08-19 | End: 2022-08-19 | Stop reason: HOSPADM

## 2022-08-19 RX ORDER — ATROPINE SULFATE 0.1 MG/ML
0.5 INJECTION INTRAVENOUS
Status: DISCONTINUED | OUTPATIENT
Start: 2022-08-19 | End: 2022-08-19 | Stop reason: HOSPADM

## 2022-08-19 RX ORDER — MIDAZOLAM HYDROCHLORIDE 1 MG/ML
.25-5 INJECTION, SOLUTION INTRAMUSCULAR; INTRAVENOUS
Status: DISCONTINUED | OUTPATIENT
Start: 2022-08-19 | End: 2022-08-19 | Stop reason: HOSPADM

## 2022-08-19 RX ORDER — SODIUM CHLORIDE 0.9 % (FLUSH) 0.9 %
5-40 SYRINGE (ML) INJECTION AS NEEDED
Status: DISCONTINUED | OUTPATIENT
Start: 2022-08-19 | End: 2022-08-19 | Stop reason: HOSPADM

## 2022-08-19 RX ORDER — EPINEPHRINE 0.1 MG/ML
1 INJECTION INTRACARDIAC; INTRAVENOUS
Status: DISCONTINUED | OUTPATIENT
Start: 2022-08-19 | End: 2022-08-19 | Stop reason: HOSPADM

## 2022-08-19 RX ADMIN — PROPOFOL 200 MG: 10 INJECTION, EMULSION INTRAVENOUS at 10:21

## 2022-08-19 RX ADMIN — SODIUM CHLORIDE 100 ML/HR: 9 INJECTION, SOLUTION INTRAVENOUS at 10:08

## 2022-08-19 NOTE — PROCEDURES
[facility]                  Colonoscopy Operative Report    8/19/2022      Herb Lunsford  [id number]  1955    Procedure Type:   Colonoscopy --diagnostic     Indications:    Occult blood in stool, Positive Cologuard      Pre-operative Diagnosis: see indication above    Post-operative Diagnosis:  See findings below    :  Shannon Todd MD    Referring Provider: Jaime Koenig MD      Sedation:  MAC anesthesia Propofol    Pre-Procedural Exam:      Airway: clear,  No airway problems anticipated  Heart: RRR, without gallops or rubs  Lungs: clear bilaterally without wheezes, crackles, or rhonchi  Abdomen: soft, nontender, nondistended, bowel sounds present  Mental Status: awake, alert and oriented to person, place and time     Procedure Details:  After informed consent was obtained with all risks and benefits of procedure explained and preoperative exam completed, the patient was taken to the endoscopy suite and placed in the left lateral decubitus position. Upon sequential sedation as per above, a digital rectal exam was performed . The Olympus videocolonoscope  was inserted in the rectum and carefully advanced to the cecum, which was identified by the ileocecal valve and appendiceal orifice. The cecum was identified by the ileocecal valve and appendiceal orifice. The quality of preparation was good. The colonoscope was slowly withdrawn with careful evaluation between folds. Retroflexion in the rectum was completed demonstrating internal hemorrhoids. Findings:   Rectum: Grade 2 internal hemorrhoid(s); Sigmoid:     - Diverticulosis  Descending Colon:     - Diverticulosis  Transverse Colon: normal  Ascending Colon: normal  Cecum: normal  Terminal Ileum: not intubated      Specimen Removed:  none    Complications: None. EBL:  None.     Impression:    diverticulosis,  Moderate in degree, involving the descending colon and the sigmoid  hemorrhoids internal, Large in size    Recommendations: --For colon cancer screening in this average-risk patient, colonoscopy may be repeated in 10 years. High fiber diet. Resume normal medication(s). Discharge Disposition:  Home in the company of a  when able to ambulate. Farhat Maher MD    8/19/2022     DONAVON Champion MD  [facility], [address] [facility]  [address]  [phone]  [email or web address]

## 2022-08-19 NOTE — ROUTINE PROCESS
TRANSFER - IN REPORT:    Verbal report received from Janna(name) on Stefanie De La Garza  being received from regino(unit) for routine progression of care      Report consisted of patients Situation, Background, Assessment and   Recommendations(SBAR). Information from the following report(s) SBAR, Procedure Summary, and MAR was reviewed with the receiving nurse. Opportunity for questions and clarification was provided. Assessment completed upon patients arrival to unit and care assumed.

## 2022-08-19 NOTE — DISCHARGE INSTRUCTIONS
Val Copeland MD  [facility], [address]  [facility], [address] [facility]  [phone]  [email or web address]  [id number]  1955    COLON DISCHARGE INSTRUCTIONS  Discomfort:  Redness at IV site- apply warm compress to area; if redness or soreness persist- contact your physician  There may be a slight amount of blood passed from the rectum  Gaseous discomfort- walking, belching will help relieve any discomfort  You may not operate a vehicle for 12 hours  You may not engage in an occupation involving machinery or appliances for rest of today  You may not drink alcoholic beverages for at least 12 hours  Avoid making any critical decisions for at least 24 hour  DIET:   High fiber diet. - however -  remember your colon is empty and a heavy meal will produce gas. Avoid these foods:  vegetables, fried / greasy foods, carbonated drinks for today      ACTIVITY:  You may resume your normal daily activities it is recommended that you spend the remainder of the day resting -  avoid any strenuous activity. CALL M.D. ANY SIGN OF:   Increasing pain, nausea, vomiting  Abdominal distension (swelling)  New increased bleeding (oral or rectal)  Fever (chills)  Pain in chest area  Bloody discharge from nose or mouth  Shortness of breath     COLONOSCOPY FINDINGS:  Your colonoscopy showed: large internal hemorrhoids and diverticuloosis. Follow-up Instructions:   Call Dr. Val Copeland if any questions or problems. Telephone # [phone]  Should have a repeat colonoscopy in 10 years.

## 2022-08-19 NOTE — H&P
Funmi Gould MD  [facility], [address]  [phone]  [email or web address]      Gastroenterology Outpatient History and Physical    Patient: Eryn Bill    Physician: Taz Davies MD    Vital Signs: Blood pressure (!) 162/85, pulse (!) 113, temperature 97.3 °F (36.3 °C), resp. rate 16, height 5' 4\" (1.626 m), weight 61.2 kg (135 lb), SpO2 98 %, not currently breastfeeding.     Allergies: No Known Allergies    Chief Complaint: positive Cologuard    History of Present Illness: Here for colonoscopy to evaluate positive Cologuard    History:  Past Medical History:   Diagnosis Date    Allergic rhinitis 2017    Eczema 2019    Prev numerous dermatology referrals; currently under care with homeopathy    Ill-defined condition     parkinson's    Ill-defined condition     osteopenia    Ill-defined condition     essential tremors    Liver disease     elevated liver enzymes    Osteopenia 2017    Parkinson's disease ([facility] [address] 2019    L handed tremor; no Rx as of ; Neurology Following    Vitamin D deficiency       Past Surgical History:   Procedure Laterality Date    HX  SECTION      x 1    HX HEENT      deviated septum repair    HX HYSTERECTOMY      HX OTHER SURGICAL  2008    colonoscopy    HX TONSILLECTOMY      HX UROLOGICAL      bladder sling      Social History     Socioeconomic History    Marital status:    Tobacco Use    Smoking status: Never    Smokeless tobacco: Never   Vaping Use    Vaping Use: Never used   Substance and Sexual Activity    Alcohol use: No    Drug use: No      Family History   Problem Relation Age of Onset    Elevated Lipids Mother     Cancer Mother         breast    Hypertension Father     Diabetes Father     Macular Degen Father     Heart Disease Father       Patient Active Problem List   Diagnosis Code    Allergic rhinitis J30.9    Osteopenia M85.80    Tremor of left hand R25.1    Parkinson's disease ([facility] [address] G20    Eczema L30.9    Vitamin D deficiency E55.9         Medications:   Prior to Admission medications    Medication Sig Start Date End Date Taking? Authorizing Provider   ELDERBERRY FRUIT PO Elderberry  Patient not taking: Reported on 8/18/2022    Provider, Historical   folic acid/multivit-min/lutein (CENTRUM SILVER PO) Centrum Silver  Patient not taking: Reported on 8/18/2022    Provider, Historical   omega 3-dha-epa-fish oil 100-160-1,000 mg cap Take  by mouth. Patient not taking: Reported on 8/18/2022    Provider, Historical   calcium-cholecalciferol, D3, (CALTRATE 600+D) tablet Take 1 Tab by mouth daily. Patient not taking: Reported on 8/18/2022    Provider, Historical   cranberry extract 450 mg tab tablet Take 450 mg by mouth. Patient not taking: Reported on 8/18/2022    Provider, Historical   multivitamin (ONE A DAY) tablet Take 1 Tab by mouth daily.   Patient not taking: Reported on 8/18/2022    Provider, Historical       Physical Exam:     General: well developed, well nourished   HEENT: unremarkable   Heart: regular rhythm no mumur    Lungs: clear   Abdominal:  benign   Neurological: unremarkable   Extremities: no edema     Findings/Diagnosis: Positive Cologuard    Plan of Care/Planned Procedure: Colonoscopy with  monitored anesthesia care sedation       Signed:  Candace Arango MD 8/19/2022

## 2022-08-19 NOTE — ANESTHESIA PREPROCEDURE EVALUATION
Relevant Problems   No relevant active problems       Anesthetic History   No history of anesthetic complications            Review of Systems / Medical History  Patient summary reviewed, nursing notes reviewed and pertinent labs reviewed    Pulmonary  Within defined limits                 Neuro/Psych   Within defined limits      Neuromuscular disease     Cardiovascular  Within defined limits                Exercise tolerance: >4 METS     GI/Hepatic/Renal  Within defined limits         Liver disease     Endo/Other  Within defined limits           Other Findings   Comments: Parkinson's Disease  Essential Tremors           Physical Exam    Airway  Mallampati: II  TM Distance: 4 - 6 cm  Neck ROM: normal range of motion   Mouth opening: Normal     Cardiovascular  Regular rate and rhythm,  S1 and S2 normal,  no murmur, click, rub, or gallop             Dental  No notable dental hx       Pulmonary  Breath sounds clear to auscultation               Abdominal  GI exam deferred       Other Findings            Anesthetic Plan    ASA: 2  Anesthesia type: general and total IV anesthesia          Induction: Intravenous  Anesthetic plan and risks discussed with: Patient      Propofol MAC

## 2022-08-19 NOTE — ANESTHESIA POSTPROCEDURE EVALUATION
Procedure(s):  COLONOSCOPY. general, total IV anesthesia    Anesthesia Post Evaluation        Patient location during evaluation: PACU  Note status: Adequate. Level of consciousness: responsive to verbal stimuli and sleepy but conscious  Pain management: satisfactory to patient  Airway patency: patent  Anesthetic complications: no  Cardiovascular status: acceptable  Respiratory status: acceptable  Hydration status: acceptable  Comments: +Post-Anesthesia Evaluation and Assessment    Patient: Ankita Celestin MRN: [id number]  SSN: [id number]   YOB: 1955  Age: 78 y.o. Sex: female      Cardiovascular Function/Vital Signs    BP (!) 132/55   Pulse 71   Temp 36.6 °C (97.9 °F)   Resp 15   Ht 5' 4\" (1.626 m)   Wt 61.2 kg (135 lb)   SpO2 100%   Breastfeeding No   BMI 23.17 kg/m²     Patient is status post Procedure(s):  COLONOSCOPY. Nausea/Vomiting: Controlled. Postoperative hydration reviewed and adequate. Pain:  Pain Scale 1: Numeric (0 - 10) (08/19/22 1054)  Pain Intensity 1: 0 (08/19/22 1054)   Managed. Neurological Status: At baseline. Mental Status and Level of Consciousness: Arousable. Pulmonary Status:   O2 Device: None (Room air) (08/19/22 1054)   Adequate oxygenation and airway patent. Complications related to anesthesia: None    Post-anesthesia assessment completed. No concerns. Signed By: Madai Alicea MD    8/19/2022  Post anesthesia nausea and vomiting:  controlled      INITIAL Post-op Vital signs:   Vitals Value Taken Time   /64 08/19/22 1057   Temp 36.6 °C (97.9 °F) 08/19/22 1046   Pulse 73 08/19/22 1058   Resp 13 08/19/22 1058   SpO2 99 % 08/19/22 1058   Vitals shown include unvalidated device data.

## 2022-10-06 ENCOUNTER — OFFICE VISIT (OUTPATIENT)
Dept: INTERNAL MEDICINE CLINIC | Age: 67
End: 2022-10-06
Payer: MEDICARE

## 2022-10-06 VITALS
SYSTOLIC BLOOD PRESSURE: 138 MMHG | HEIGHT: 63 IN | OXYGEN SATURATION: 97 % | HEART RATE: 105 BPM | BODY MASS INDEX: 24.66 KG/M2 | RESPIRATION RATE: 20 BRPM | WEIGHT: 139.2 LBS | DIASTOLIC BLOOD PRESSURE: 64 MMHG

## 2022-10-06 DIAGNOSIS — R74.01 TRANSAMINITIS: ICD-10-CM

## 2022-10-06 DIAGNOSIS — R82.998 DARK URINE: ICD-10-CM

## 2022-10-06 DIAGNOSIS — M79.641 RIGHT HAND PAIN: Primary | ICD-10-CM

## 2022-10-06 DIAGNOSIS — R73.03 PREDIABETES: ICD-10-CM

## 2022-10-06 DIAGNOSIS — K75.9 HEPATITIS: ICD-10-CM

## 2022-10-06 PROCEDURE — 99214 OFFICE O/P EST MOD 30 MIN: CPT | Performed by: INTERNAL MEDICINE

## 2022-10-06 PROCEDURE — 1123F ACP DISCUSS/DSCN MKR DOCD: CPT | Performed by: INTERNAL MEDICINE

## 2022-10-06 RX ORDER — TACROLIMUS 1 MG/G
OINTMENT TOPICAL
COMMUNITY
Start: 2022-09-06

## 2022-10-06 RX ORDER — PREDNISONE 20 MG/1
20 TABLET ORAL
Qty: 5 TABLET | Refills: 0 | Status: SHIPPED | OUTPATIENT
Start: 2022-10-06 | End: 2022-10-11

## 2022-10-06 NOTE — PROGRESS NOTES
Chief Complaint   Patient presents with    Wrist Pain       1. \"Have you been to the ER, urgent care clinic since your last visit? Hospitalized since your last visit? \" No    2. \"Have you seen or consulted any other health care providers outside of the [address] [place] [address] since your last visit? \" No     3. For patients aged 39-70: Has the patient had a colonoscopy / FIT/ Cologuard? No      If the patient is female:    4. For patients aged 41-77: Has the patient had a mammogram within the past 2 years? No      5. For patients aged 21-65: Has the patient had a pap smear?  No

## 2022-10-06 NOTE — PROGRESS NOTES
ICD-10-CM ICD-9-CM    1. Right hand pain  M79.641 729.5 XR HAND RT MIN 3 V      predniSONE (DELTASONE) 20 mg tablet      URIC ACID      SED RATE (ESR)      C REACTIVE PROTEIN, QT      RA + CCP ABS      RA + CCP ABS      C REACTIVE PROTEIN, QT      SED RATE (ESR)      URIC ACID      2. Transaminitis  P05.89 814.7 METABOLIC PANEL, COMPREHENSIVE      CBC WITH AUTOMATED DIFF      CBC WITH AUTOMATED DIFF      METABOLIC PANEL, COMPREHENSIVE      3. Prediabetes  R73.03 790.29 HEMOGLOBIN A1C WITH EAG      HEMOGLOBIN A1C WITH EAG      4. Dark urine  R82.998 791.9 URINALYSIS W/ RFLX MICROSCOPIC      CULTURE, URINE      CULTURE, URINE      URINALYSIS W/ RFLX MICROSCOPIC      5. Hepatitis  K75.9 573.3 PROTHROMBIN TIME + INR      PROTHROMBIN TIME + INR               Subjective:     Chief Complaint   Patient presents with    Wrist Pain       Kristal Oconnor is a 75 y.o. F.  He has a past medical history of transaminitis, for which I had previously referred her to gastroenterology for further evaluation. I reviewed and updated the medical record. I saw this patient most recently back in May, for establishment. She has been feeling generally well at the time, except for a history of Parkinson's disease, for which she was being followed by neurology. Subsequent laboratory testing had demonstrated transaminitis, and I had referred her to gastroenterology for further evaluation. Today, the patient comes in for follow-up. She has an acute complaint today of worsening right-sided hand pain, and also has had worsening fatigue over the past month. She notes that she had recently undergone colonoscopy at the direction of gastroenterology, back in September, which apparently had been unremarkable.   She has pending follow-up with her hepatologist now, and continues to be followed by her neurologist for her Parkinson's disease, and notes that he had previously offered her Sinemet, but she has declined this and has not started the medication. Her main complaint today relates to fatigue. Over the past month or so, she has had worsening fatigue, although she cannot otherwise describe this. She denies having had any chest pain or shortness of breath. She notes that her gastroenterologist/hepatologist have been monitoring her blood levels of her liver enzymes over the past several months, and her most recent hepatic function panel recently resulted about a week ago with , and , with alkaline phosphatase 208. Her total bilirubin was normal.  She has not noted any change in the color of her stool but has noted that her urine has been darker. She denies any abdominal pain, or abdominal swelling. No lower extremity edema. Her other complaint relates to right-sided wrist pain. She says that about 48 hours ago, she started having worsening right-sided wrist pain, with associated puffiness in the hands to the point where she felt like she could not squeeze her fingers. She notes significant tenderness at the joints, at her wrist, MCPs, and PIPs. She notes that this is happened to her before. She notes a longstanding history of bilateral eczema in her hands, which she thinks is overall stable. She denies any fevers or chills. Other than this, review of systems is negative. She denies having had any dysuria, urinary frequency, flank pain, or abdominal pain. Routine Healthcare Maintenance issues are reviewed and discussed with the patient as noted below. Orders to update gaps in healthcare maintenance were placed as noted below in the Assessment and Plan, where applicable. 10-year Cardiovascular Risk Araceli Factor, 2013):   The 10-year ASCVD risk score (Marla SIFUENTES, et al., 2019) is: 8.4%    Values used to calculate the score:      Age: 75 years      Sex: Female      Is Non- : No      Diabetic: No      Tobacco smoker: No      Systolic Blood Pressure: 181 mmHg      Is BP treated: No      HDL Cholesterol: 74 MG/DL      Total Cholesterol: 217 MG/DL       Past Medical History:  Past Medical History:   Diagnosis Date    Allergic rhinitis 2017    Diverticulosis 2022    CSP finding    Eczema 2019    Prev numerous dermatology referrals; currently under care with homeopathy    History of gallstones 2022    Hypercholesterolemia 2022    Internal hemorrhoids 2022    CSP finding    Osteopenia 2017    Parkinson's disease ([address] 2019    L handed tremor; no Rx as of ; Neurology Following    Prediabetes 2022    A1C  = 6%    Transaminitis     Vitamin D deficiency        Past Surgical Histor:  Past Surgical History:   Procedure Laterality Date    COLONOSCOPY N/A 2022    COLONOSCOPY performed by Arnulfo Vicente MD at [facility] ENDOSCOPY    HX  SECTION      x 1    HX HEENT      deviated septum repair    HX HYSTERECTOMY      HX OTHER SURGICAL  2008    colonoscopy    HX TONSILLECTOMY      HX UROLOGICAL      bladder sling       Allergies:  No Known Allergies    Medications:  Current Outpatient Medications   Medication Sig Dispense Refill    predniSONE (DELTASONE) 20 mg tablet Take 1 Tablet by mouth daily (with breakfast) for 5 days.  5 Tablet 0    ELDERBERRY FRUIT PO       tacrolimus (PROTOPIC) 0.1 % ointment APPLY TWICE DAILY TO ECZEMA DAILY ( OKAY TO USE ON ALL SKIN WHEN NOT BROKEN)         Social History:  Social History     Socioeconomic History    Marital status:    Tobacco Use    Smoking status: Never    Smokeless tobacco: Never   Vaping Use    Vaping Use: Never used   Substance and Sexual Activity    Alcohol use: No    Drug use: No       Family History:  Family History   Problem Relation Age of Onset    Elevated Lipids Mother     Cancer Mother         breast    Hypertension Father     Diabetes Father     Macular Degen Father     Heart Disease Father        Immunizations:  Immunization History   Administered Date(s) Administered    Influenza Vaccine 10/01/2018    Influenza Vaccine PF 10/26/2018    Influenza, FLUARIX, FLULAVAL, FLUZONE (age 10 mo+) AND AFLURIA, (age 1 y+), PF, 0.5mL 10/18/2016, 10/20/2017        Healthcare Maintenance:  Health Maintenance   Topic Date Due    Depression Screen  Never done    COVID-19 Vaccine (1) Never done    DTaP/Tdap/Td series (1 - Tdap) Never done    Shingrix Vaccine Age 50> (1 of 2) Never done    Pneumococcal 65+ years (1 - PCV) Never done    Flu Vaccine (1) 08/01/2022    A1C test (Diabetic or Prediabetic)  05/10/2023    Medicare Yearly Exam  05/11/2023    Breast Cancer Screen Mammogram  11/29/2023    Lipid Screen  05/10/2027    Colorectal Cancer Screening Combo  08/19/2032    Hepatitis C Screening  Completed    Bone Densitometry (Dexa) Screening  Completed        Review of Systems:  ROS:  Review of Systems   Constitutional:  Positive for malaise/fatigue and weight loss. Musculoskeletal:  Positive for joint pain. Neurological:  Positive for tremors. ROS otherwise negative      Objective:     Vital Signs:  Visit Vitals  /64 (BP 1 Location: Right arm, BP Patient Position: Sitting, BP Cuff Size: Adult)   Pulse (!) 105   Resp 20   Ht 5' 3\" (1.6 m)   Wt 139 lb 3.2 oz (63.1 kg)   SpO2 97%   BMI 24.66 kg/m²       BMI:  Body mass index is 24.66 kg/m². Physical Examination:  Physical Exam  Constitutional:       Appearance: Normal appearance. She is normal weight. HENT:      Head: Normocephalic and atraumatic. Right Ear: External ear normal.      Left Ear: External ear normal.      Nose: Nose normal.      Mouth/Throat:      Mouth: Mucous membranes are moist.      Pharynx: Oropharynx is clear. No oropharyngeal exudate or posterior oropharyngeal erythema. Cardiovascular:      Rate and Rhythm: Normal rate and regular rhythm. Pulses: Normal pulses. Heart sounds: Normal heart sounds. No murmur heard. No friction rub. No gallop.    Pulmonary:      Effort: Pulmonary effort is normal. No respiratory distress. Breath sounds: Normal breath sounds. No wheezing, rhonchi or rales. Abdominal:      General: Abdomen is flat. Bowel sounds are normal. There is no distension. Palpations: Abdomen is soft. Tenderness: no abdominal tenderness There is no right CVA tenderness, left CVA tenderness or guarding. Musculoskeletal:         General: Swelling and tenderness present. No deformity. Normal range of motion. Cervical back: Normal range of motion and neck supple. No rigidity or tenderness. Comments: Swollen wrist and hand on R, + heberdens and bouchards nodes   Skin:     General: Skin is warm and dry. Findings: Rash present. No erythema or lesion. Neurological:      General: No focal deficit present. Mental Status: She is alert and oriented to person, place, and time. Mental status is at baseline. Sensory: No sensory deficit. Motor: No weakness.       Gait: Gait normal.      Deep Tendon Reflexes: Reflexes normal.      Comments: Resting pill rolling tremor   Psychiatric:         Mood and Affect: Mood normal.         Behavior: Behavior normal.         Judgment: Judgment normal.        Physical exam otherwise negative    Diagnostic Testing:    Laboratory Studies:  Lab Only on 2022   Component Date Value Ref Range Status    Hepatitis B surface Ag 2022 0.38  Index Final    Hep B surface Ag Interp. 2022 Negative  Negative   Final    Hepatitis B surface Ab 2022 11.40  mIU/mL Final    Hep B surface Ab Interp. 2022 REACTIVE (A)  NONREACTIVE   Final    Hep B Core Ab, IgM 2022 Negative  Negative   Final    Hep B Core Ab, total 2022 Negative  Negative   Final    Comment: (NOTE)  Performed At: [address] [id number]  Coreen Vidales MD R      Actin (Smooth Muscle) Ab 2022 22 (A)  0 - 19 Units Final    Comment: (NOTE)                  Negative                     0 - 19 Weak positive               20 - 30                  Moderate to strong positive     >30  Actin Antibodies are found in 52-85% of patients with  autoimmune hepatitis or chronic active hepatitis and  in 22% of patients with primary biliary cirrhosis. Performed At: [facility] & [address] [id number]  Maurizio Mcgee MD WF:[id number]      Michochondrial (M2) Ab 05/18/2022 <20.0  0.0 - 20.0 Units Final    Comment: (NOTE)                                 Negative    0.0 - 20.0                                 Equivocal  20.1 - 24.9                                 Positive         >24.9  Mitochondrial (M2) Antibodies are found in 90-96% of  patients with primary biliary cirrhosis. Performed At: [facility] & [address] [id number]  Maurizio Mcgee MD DL:[id number]      Anti-Nuclear Ab by IFA (RDL) 05/18/2022 Negative  Negative   Final    Comment: (NOTE)  This test was developed and its performance characteristics  determined by Kevin Garrido. It has not been cleared or approved  by the Food and Drug Administration.   Performed At: [place] [id number] [de-identified]  Samuel Paul MD MP:[id number]     Office Visit on 05/10/2022   Component Date Value Ref Range Status    Hep C virus Ab Interp. 05/10/2022 NONREACTIVE  NONREACTIVE   Final    Method used is Siemens Advia Centaur    Hemoglobin A1c 05/10/2022 6.0 (A)  4.0 - 5.6 % Final    Comment: NEW METHOD PLEASE NOTE NEW REFERENCE RANGE  (NOTE)  HbA1C Interpretive Ranges  <5.7              Normal  5.7 - 6.4         Consider Prediabetes  >6.5              Consider Diabetes      Est. average glucose 05/10/2022 126  mg/dL Final    WBC 05/10/2022 5.8  3.6 - 11.0 K/uL Final    RBC 05/10/2022 4.62  3.80 - 5.20 M/uL Final    HGB 05/10/2022 13.6  11.5 - 16.0 g/dL Final    HCT 05/10/2022 41.3  35.0 - 47.0 % Final    MCV 05/10/2022 89.4  80.0 - 99.0 FL Final    MCH 05/10/2022 29.4  26.0 - 34.0 PG Final    MCHC 05/10/2022 32.9  30.0 - 36.5 g/dL Final    RDW 05/10/2022 13.1  11.5 - 14.5 % Final    PLATELET 29/04/0571 824  150 - 400 K/uL Final    MPV 05/10/2022 10.9  8.9 - 12.9 FL Final    NRBC 05/10/2022 0.0  0  WBC Final    ABSOLUTE NRBC 05/10/2022 0.00  0.00 - 0.01 K/uL Final    NEUTROPHILS 05/10/2022 69  32 - 75 % Final    LYMPHOCYTES 05/10/2022 18  12 - 49 % Final    MONOCYTES 05/10/2022 7  5 - 13 % Final    EOSINOPHILS 05/10/2022 5  0 - 7 % Final    BASOPHILS 05/10/2022 1  0 - 1 % Final    IMMATURE GRANULOCYTES 05/10/2022 0  0.0 - 0.5 % Final    ABS. NEUTROPHILS 05/10/2022 4.0  1.8 - 8.0 K/UL Final    ABS. LYMPHOCYTES 05/10/2022 1.0  0.8 - 3.5 K/UL Final    ABS. MONOCYTES 05/10/2022 0.4  0.0 - 1.0 K/UL Final    ABS. EOSINOPHILS 05/10/2022 0.3  0.0 - 0.4 K/UL Final    ABS. BASOPHILS 05/10/2022 0.1  0.0 - 0.1 K/UL Final    ABS. IMM. GRANS. 05/10/2022 0.0  0.00 - 0.04 K/UL Final    DF 05/10/2022 AUTOMATED    Final    Sodium 05/10/2022 137  136 - 145 mmol/L Final    Potassium 05/10/2022 4.1  3.5 - 5.1 mmol/L Final    Chloride 05/10/2022 105  97 - 108 mmol/L Final    CO2 05/10/2022 26  21 - 32 mmol/L Final    Anion gap 05/10/2022 6  5 - 15 mmol/L Final    Glucose 05/10/2022 124 (A)  65 - 100 mg/dL Final    BUN 05/10/2022 12  6 - 20 MG/DL Final    Creatinine 05/10/2022 0.68  0.55 - 1.02 MG/DL Final    BUN/Creatinine ratio 05/10/2022 18  12 - 20   Final    GFR est AA 05/10/2022 >60  >60 ml/min/1.73m2 Final    GFR est non-AA 05/10/2022 >60  >60 ml/min/1.73m2 Final    Comment: Estimated GFR is calculated using the IDMS-traceable Modification of Diet in  Renal Disease (MDRD) Study equation, reported for both  Americans  (GFRAA) and non- Americans (GFRNA), and normalized to 1.73m2 body  surface area. The physician must decide which value applies to the patient.       Calcium 05/10/2022 9.9  8.5 - 10.1 MG/DL Final    Bilirubin, total 05/10/2022 0.4  0.2 - 1.0 MG/DL Final    ALT (SGPT) 05/10/2022 84 (A)  12 - 78 U/L Final    AST (SGOT) 05/10/2022 71 (A)  15 - 37 U/L Final    Alk. phosphatase 05/10/2022 137 (A)  45 - 117 U/L Final    Protein, total 05/10/2022 7.6  6.4 - 8.2 g/dL Final    Albumin 05/10/2022 4.2  3.5 - 5.0 g/dL Final    Globulin 05/10/2022 3.4  2.0 - 4.0 g/dL Final    A-G Ratio 05/10/2022 1.2  1.1 - 2.2   Final    Cholesterol, total 05/10/2022 217 (A)  <200 MG/DL Final    Triglyceride 05/10/2022 101  <150 MG/DL Final    Comment: Based on NCEP-ATP III:  Triglycerides <150 mg/dL  is considered normal, 150-199  mg/dL  borderline high,  200-499 mg/dL high and  greater than or equal to 500  mg/dL very high. HDL Cholesterol 05/10/2022 74  MG/DL Final    Comment: Based on NCEP ATP III, HDL Cholesterol <40 mg/dL is considered low and >60  mg/dL is elevated. LDL, calculated 05/10/2022 122.8 (A)  0 - 100 MG/DL Final    Comment: Based on the NCEP-ATP: LDL-C concentrations are considered  optimal <100 mg/dL,  near optimal/above Normal 100-129 mg/dL Borderline High: 130-159, High: 160-189  mg/dL Very High: Greater than or equal to 190 mg/dL      VLDL, calculated 05/10/2022 20.2  MG/DL Final    CHOL/HDL Ratio 05/10/2022 2.9  0.0 - 5.0   Final    Ferritin 05/10/2022 88  8 - 252 NG/ML Final    TSH 05/10/2022 1.11  0.36 - 3.74 uIU/mL Final    Comment:   Due to TSH heterogeneity, both structurally and degree of glycosylation,  monoclonal antibodies used in the TSH assay may not accurately quantitate TSH. Therefore, this result should be correlated with clinical findings as well as  with other assessments of thyroid function, e.g., free T4, free T3.       Vitamin D 25-Hydroxy 05/10/2022 46.9  30 - 100 ng/mL Final    Comment: (NOTE)  Deficiency               <20 ng/mL  Insufficiency          20-30 ng/mL  Sufficient             ng/mL  Possible toxicity       >100 ng/mL    The Method used is Siemens Advia Centaur currently standardized to a   Center of Disease Control and Prevention (CDC) certified reference   method. Samples containing fluorescein dye can produce falsely   elevated values when tested with the ADVIA Centaur Vitamin D Assay. It is recommended that results in the toxic range, >100 ng/mL, be   retested 72 hours post fluorescein exposure. Radiographic Studies:  XR Results (most recent):  No results found for this or any previous visit. MELIDA Results (most recent):  No results found for this or any previous visit. CT Results (most recent):  No results found for this or any previous visit. DEXA Results (most recent):  No results found for this or any previous visit. MRI Results (most recent):  Results from [place] encounter on 02/14/12    MRI ANKLE RIGHT WITHOUT CONTRAST    Narrative  **Final Report**      ICD Codes / Adm. Diagnosis: 845.00  727.06 / SPRAIN OF ANKLE, UNSPECIFIED S  TENOSYNOVITIS OF FOOT AND AN  Examination:  MR ANKLE MRI WO CON RT  - [id number] - Feb 14 2012  2:35PM  Accession No:  [id number]  Reason:  Pain in limb, tenosynovitis of ankle, sprain of ankle      REPORT:  INDICATION: Right ankle pain. EXAM: MRI right ankle without contrast.    Comparisons: None    Sequences include sagittal T1, sagittal, coronal, and axial T2 fat  saturation, axial proton density with fat saturation. Sagittal  fat-suppressed spoiled gradient echo images. Findings: Examination of the marrow demonstrates no evidence of stress  reaction, fracture or marrow replacing process. Alignment of the ankle is  normal. The talar dome is intact. There are no discrete cartilage defect. No  evidence of coalition. The sinus tarsi fat is normal. The plantar fascia is  not thickened or inflamed. There is high-grade partial thickness tearing of  the peroneus brevis and distal to the lateral malleolus. The peroneus longus  is thickened proximal to the lateral malleolus compatible with tendinopathy. Remaining ankle tendons are intact. Ankle ligaments are intact.  No discrete  mass lesions. IMPRESSION:  1. High-grade partial thickness tear of the peroneus brevis distal to the  lateral malleolus  2. Significant tendinopathy of the peroneus longus        Signing/Reading Doctor: Jamaal Blum ([id number])  Approved: Jamaal Blum ([id number])  02/14/2012       Assessment/Plan:       ICD-10-CM ICD-9-CM    1. Right hand pain  M79.641 729.5 XR HAND RT MIN 3 V      predniSONE (DELTASONE) 20 mg tablet      URIC ACID      SED RATE (ESR)      C REACTIVE PROTEIN, QT      RA + CCP ABS      RA + CCP ABS      C REACTIVE PROTEIN, QT      SED RATE (ESR)      URIC ACID      2. Transaminitis  C41.14 216.8 METABOLIC PANEL, COMPREHENSIVE      CBC WITH AUTOMATED DIFF      CBC WITH AUTOMATED DIFF      METABOLIC PANEL, COMPREHENSIVE      3. Prediabetes  R73.03 790.29 HEMOGLOBIN A1C WITH EAG      HEMOGLOBIN A1C WITH EAG      4. Dark urine  R82.998 791.9 URINALYSIS W/ RFLX MICROSCOPIC      CULTURE, URINE      CULTURE, URINE      URINALYSIS W/ RFLX MICROSCOPIC      5. Hepatitis  K75.9 573.3 PROTHROMBIN TIME + INR      PROTHROMBIN TIME + INR             This patient with history of previous transaminitis presents with fatigue. She also has a complaint of right-sided hand swelling and pain. Checking plain films and labs for the hand pain, which I suspect is likely an inflammatory arthritis. However, more concerningly, she has had development of worsening transaminitis and will not be seeing her hepatologist for a couple more weeks. This potentially could explain her fatigue if there is significant liver injury. I am not sure as to the cause of this. Repeating her labs as noted above. Checking for anemia as well. Additional imaging or laboratory studies will depend on these results. Return precautions provided. Patient endorses agreement and understanding. I have reviewed the patient's medical history in detail and updated the computerized patient record.       We had a prolonged discussion about these complex clinical issues and went over the various important aspects to consider. All questions were answered. Advised the patient to call back or return to office if symptoms do not improve, change in nature, or persist.     The patient was given an after visit summary or informed of [employer] which includes patient instructions, diagnoses, current medications, & vitals. she expressed understanding with the diagnosis and plan. Yoel Dumont MD    Please note that this dictation was completed with [employer], the computer voice recognition software. Quite often unanticipated grammatical, syntax, homophones, and other interpretive errors are inadvertently transcribed by the computer software. Please disregard these errors. Please excuse any errors that have escaped final proofreading.

## 2022-10-07 LAB
ALBUMIN SERPL-MCNC: 3.6 G/DL (ref 3.5–5)
ALBUMIN/GLOB SERPL: 0.9 {RATIO} (ref 1.1–2.2)
ALP SERPL-CCNC: 210 U/L (ref 45–117)
ALT SERPL-CCNC: 574 U/L (ref 12–78)
ANION GAP SERPL CALC-SCNC: 7 MMOL/L (ref 5–15)
APPEARANCE UR: CLEAR
AST SERPL-CCNC: 397 U/L (ref 15–37)
BACTERIA URNS QL MICRO: NEGATIVE /HPF
BASOPHILS # BLD: 0 K/UL (ref 0–0.1)
BASOPHILS NFR BLD: 0 % (ref 0–1)
BILIRUB SERPL-MCNC: 1.7 MG/DL (ref 0.2–1)
BILIRUB UR QL: NEGATIVE
BUN SERPL-MCNC: 13 MG/DL (ref 6–20)
BUN/CREAT SERPL: 17 (ref 12–20)
CALCIUM SERPL-MCNC: 9.2 MG/DL (ref 8.5–10.1)
CHLORIDE SERPL-SCNC: 102 MMOL/L (ref 97–108)
CO2 SERPL-SCNC: 25 MMOL/L (ref 21–32)
COLOR UR: ABNORMAL
COMMENT, HOLDF: NORMAL
CREAT SERPL-MCNC: 0.75 MG/DL (ref 0.55–1.02)
CRP SERPL-MCNC: 6.59 MG/DL (ref 0–0.6)
DIFFERENTIAL METHOD BLD: ABNORMAL
EOSINOPHIL # BLD: 0 K/UL (ref 0–0.4)
EOSINOPHIL NFR BLD: 0 % (ref 0–7)
EPITH CASTS URNS QL MICRO: ABNORMAL /LPF
ERYTHROCYTE [DISTWIDTH] IN BLOOD BY AUTOMATED COUNT: 13.7 % (ref 11.5–14.5)
ERYTHROCYTE [SEDIMENTATION RATE] IN BLOOD: 13 MM/HR (ref 0–30)
EST. AVERAGE GLUCOSE BLD GHB EST-MCNC: 123 MG/DL
GLOBULIN SER CALC-MCNC: 4 G/DL (ref 2–4)
GLUCOSE SERPL-MCNC: 236 MG/DL (ref 65–100)
GLUCOSE UR STRIP.AUTO-MCNC: NEGATIVE MG/DL
HBA1C MFR BLD: 5.9 % (ref 4–5.6)
HCT VFR BLD AUTO: 40.7 % (ref 35–47)
HGB BLD-MCNC: 13.6 G/DL (ref 11.5–16)
HGB UR QL STRIP: NEGATIVE
HYALINE CASTS URNS QL MICRO: ABNORMAL /LPF (ref 0–5)
IMM GRANULOCYTES # BLD AUTO: 0.1 K/UL (ref 0–0.04)
IMM GRANULOCYTES NFR BLD AUTO: 0 % (ref 0–0.5)
INR PPP: 1.2 (ref 0.9–1.1)
KETONES UR QL STRIP.AUTO: 15 MG/DL
LEUKOCYTE ESTERASE UR QL STRIP.AUTO: ABNORMAL
LYMPHOCYTES # BLD: 1 K/UL (ref 0.8–3.5)
LYMPHOCYTES NFR BLD: 6 % (ref 12–49)
MCH RBC QN AUTO: 29.6 PG (ref 26–34)
MCHC RBC AUTO-ENTMCNC: 33.4 G/DL (ref 30–36.5)
MCV RBC AUTO: 88.5 FL (ref 80–99)
MONOCYTES # BLD: 1.2 K/UL (ref 0–1)
MONOCYTES NFR BLD: 7 % (ref 5–13)
NEUTS SEG # BLD: 13.7 K/UL (ref 1.8–8)
NEUTS SEG NFR BLD: 87 % (ref 32–75)
NITRITE UR QL STRIP.AUTO: NEGATIVE
NRBC # BLD: 0 K/UL (ref 0–0.01)
NRBC BLD-RTO: 0 PER 100 WBC
PH UR STRIP: 6 [PH] (ref 5–8)
PLATELET # BLD AUTO: 191 K/UL (ref 150–400)
PMV BLD AUTO: 11.2 FL (ref 8.9–12.9)
POTASSIUM SERPL-SCNC: 4.1 MMOL/L (ref 3.5–5.1)
PROT SERPL-MCNC: 7.6 G/DL (ref 6.4–8.2)
PROT UR STRIP-MCNC: NEGATIVE MG/DL
PROTHROMBIN TIME: 12.2 SEC (ref 9–11.1)
RBC # BLD AUTO: 4.6 M/UL (ref 3.8–5.2)
RBC #/AREA URNS HPF: ABNORMAL /HPF (ref 0–5)
SAMPLES BEING HELD,HOLD: NORMAL
SODIUM SERPL-SCNC: 134 MMOL/L (ref 136–145)
SP GR UR REFRACTOMETRY: 1.02 (ref 1–1.03)
URATE SERPL-MCNC: 4.4 MG/DL (ref 2.6–6)
UROBILINOGEN UR QL STRIP.AUTO: 1 EU/DL (ref 0.2–1)
WBC # BLD AUTO: 15.9 K/UL (ref 3.6–11)
WBC URNS QL MICRO: ABNORMAL /HPF (ref 0–4)

## 2022-10-07 NOTE — PROGRESS NOTES
Please call the patient regarding her diagnostic evaluation. Stable prediabetes. Uric acid normal.  Some suggestion of inflammation with white blood cell count elevated, CRP elevated. However, elevated white blood cell count may be secondary to prednisone use. Prednisone use also likely explains her finding of hyperglycemia. I would expect this to resolve after she discontinues the medication. Most importantly, she had significant elevation of her ALT, AST, and alkaline phosphatase. These are significantly worsened from 5 months ago. She needs to discuss this as quickly as possible with her hepatologist.  Pablo Vizcainon other laboratory studies to further evaluate her complaint of arthritis. I note previous extensive serologic testing did not point to a particular etiology. Please have the patient walk into clinic on Monday for repeat laboratory studies and to reevaluate her hand.

## 2022-10-08 LAB
BACTERIA SPEC CULT: NORMAL
SERVICE CMNT-IMP: NORMAL

## 2022-10-09 LAB
CCP IGA+IGG SERPL IA-ACNC: 14 UNITS (ref 0–19)
RHEUMATOID FACT SERPL-ACNC: 10.5 IU/ML (ref 0–15)

## 2022-10-10 ENCOUNTER — TELEPHONE (OUTPATIENT)
Dept: HEMATOLOGY | Age: 67
End: 2022-10-10

## 2022-10-10 ENCOUNTER — DOCUMENTATION ONLY (OUTPATIENT)
Dept: HEMATOLOGY | Age: 67
End: 2022-10-10

## 2022-10-10 NOTE — TELEPHONE ENCOUNTER
[email or web address] Spoke w/patient concerning need for sooner appt. At this time no sooner appt available but her PCP can call  to discuss elevated liver enzymes. Patient does have appt w/GI/Hep at [facility] on 11/9/22. Sunday Juarez will be able to review labs and make discuss if patient need a sooner appt. Patient verbalize understanding. Will route to . (KF)

## 2022-10-10 NOTE — PROGRESS NOTES
Patient called asking to move February NP appt moved up due to her experiencing lots of fatigue. She stated that her liver enzymes have increased significantly since she scheduled this appt.  Patient advised to get her doctor to fax over elevated liver enzymes and have her doctor to contact Dr. Shilpa Dunbar to move appointment up sooner

## 2022-10-12 ENCOUNTER — TELEPHONE (OUTPATIENT)
Dept: INTERNAL MEDICINE CLINIC | Age: 67
End: 2022-10-12

## 2022-10-12 NOTE — TELEPHONE ENCOUNTER
Patient's daughter Ms. Regina Smith called regarding the patient. Stated she would like to speak to Dr. Gisselle Patel if possible, stated she is an rn and would like to go over some other possible things that could be going on with her mother. Stated that she as well as her mother are very concerned about her increased fatigue as well as the elevation in her liver enzymes. Stated they were able to move her appointment up with Dr. Fernanda Humphries.

## 2022-10-18 ENCOUNTER — TRANSCRIBE ORDER (OUTPATIENT)
Dept: SCHEDULING | Age: 67
End: 2022-10-18

## 2022-10-18 ENCOUNTER — OFFICE VISIT (OUTPATIENT)
Dept: HEMATOLOGY | Age: 67
End: 2022-10-18
Payer: MEDICARE

## 2022-10-18 VITALS
DIASTOLIC BLOOD PRESSURE: 69 MMHG | HEART RATE: 106 BPM | TEMPERATURE: 97.5 F | HEIGHT: 63 IN | SYSTOLIC BLOOD PRESSURE: 136 MMHG | BODY MASS INDEX: 24.17 KG/M2 | OXYGEN SATURATION: 97 % | WEIGHT: 136.4 LBS

## 2022-10-18 DIAGNOSIS — R74.8 ACID PHOSPHATASE ELEVATED: Primary | ICD-10-CM

## 2022-10-18 DIAGNOSIS — R74.8 ELEVATED LIVER ENZYMES: Primary | ICD-10-CM

## 2022-10-18 DIAGNOSIS — R94.5 ABNORMAL RESULTS OF LIVER FUNCTION STUDIES: ICD-10-CM

## 2022-10-18 PROBLEM — R25.1 TREMOR OF LEFT HAND: Status: RESOLVED | Noted: 2019-11-08 | Resolved: 2022-10-18

## 2022-10-18 PROBLEM — J30.9 ALLERGIC RHINITIS: Status: RESOLVED | Noted: 2017-09-20 | Resolved: 2022-10-18

## 2022-10-18 PROCEDURE — G9899 SCRN MAM PERF RSLTS DOC: HCPCS | Performed by: INTERNAL MEDICINE

## 2022-10-18 PROCEDURE — 1123F ACP DISCUSS/DSCN MKR DOCD: CPT | Performed by: INTERNAL MEDICINE

## 2022-10-18 PROCEDURE — 1101F PT FALLS ASSESS-DOCD LE1/YR: CPT | Performed by: INTERNAL MEDICINE

## 2022-10-18 PROCEDURE — G8536 NO DOC ELDER MAL SCRN: HCPCS | Performed by: INTERNAL MEDICINE

## 2022-10-18 PROCEDURE — G8399 PT W/DXA RESULTS DOCUMENT: HCPCS | Performed by: INTERNAL MEDICINE

## 2022-10-18 PROCEDURE — G8420 CALC BMI NORM PARAMETERS: HCPCS | Performed by: INTERNAL MEDICINE

## 2022-10-18 PROCEDURE — G8427 DOCREV CUR MEDS BY ELIG CLIN: HCPCS | Performed by: INTERNAL MEDICINE

## 2022-10-18 PROCEDURE — 99204 OFFICE O/P NEW MOD 45 MIN: CPT | Performed by: INTERNAL MEDICINE

## 2022-10-18 PROCEDURE — G8510 SCR DEP NEG, NO PLAN REQD: HCPCS | Performed by: INTERNAL MEDICINE

## 2022-10-18 PROCEDURE — 3017F COLORECTAL CA SCREEN DOC REV: CPT | Performed by: INTERNAL MEDICINE

## 2022-10-18 PROCEDURE — 1090F PRES/ABSN URINE INCON ASSESS: CPT | Performed by: INTERNAL MEDICINE

## 2022-10-18 RX ORDER — ASCORBIC ACID 500 MG
1000 TABLET ORAL
COMMUNITY

## 2022-10-18 NOTE — PROGRESS NOTES
Identified pt with two pt identifiers(name and ). Reviewed record in preparation for visit and have obtained necessary documentation. Chief Complaint   Patient presents with    Elevated Liver Enzymes     Establish care with MLS      Vitals:    10/18/22 1400   BP: 136/69   Pulse: (!) 106   Temp: 97.5 °F (36.4 °C)   TempSrc: Temporal   SpO2: 97%   Weight: 136 lb 6.4 oz (61.9 kg)   Height: 5' 3\" (1.6 m)   PainSc:   7   PainLoc: Hand       Health Maintenance Review: Patient reminded of \"due or due soon\" health maintenance. I have asked the patient to contact his/her primary care provider (PCP) for follow-up on his/her health maintenance. Coordination of Care Questionnaire:  :   1) Have you been to an emergency room, urgent care, or hospitalized since your last visit? If yes, where when, and reason for visit? no       2. Have seen or consulted any other health care provider since your last visit? If yes, where when, and reason for visit? NO      Patient is accompanied by daughter I have received verbal consent from [facility] to discuss any/all medical information while they are present in the room.

## 2022-10-18 NOTE — PROGRESS NOTES
[facility], MD, Bruce White, [facility], [place]      Chang Herzog, PA-C    Maddy Eid, [facility]   Maryanne Miller, [facility]   Gaila Prader, FNP-C   Wil Quiñonez, FNP-C    Radha Cottrell, [facility]       Marti Isaacs [address]    at [address], [facility] Christy Willis  22.    [phone]    FAX: [address] [place] [address], [address]    [phone]    FAX: [phone]       Patient Care Team:  Reuben Rene MD as PCP - General (Internal Medicine Physician)  Reuben Rene MD as PCP - [address] [place] [address] Provider  Ronit Hagen RN as Care Coordinator (Hepatology)  Kaila Grewal MD (Gastroenterology)      Problem List  Date Reviewed: 10/18/2022            Codes Class Noted    Osteopenia ICD-10-CM: M85.80  ICD-9-CM: 733.90  9/20/2017        Elevated liver enzymes ICD-10-CM: R74.8  ICD-9-CM: 790.5  10/18/2022        Vitamin D deficiency ICD-10-CM: E55.9  ICD-9-CM: 268.9  Unknown        [facility]'s [facility] ([facility] ICD-10-CM: G20  ICD-9-CM: 332.0  2019    Overview Signed 5/10/2022 12:04 PM by Reuben Rene MD     L handed tremor; no Rx as of 5/22             Eczema ICD-10-CM: L30.9  ICD-9-CM: 692.9  2019    Overview Signed 5/10/2022 12:05 PM by Reuben Rene MD     Prev numerous dermatology referrals; currently under care with homeopathy                The clinicians listed above have asked me to see [facility] in consultation regarding elevated liver enzymes and its management. All medical records sent by the referring physicians were reviewed including imaging studies     The patient is a 75 y.o.  female who was found to have elevated liver enzymes liver transaminases and alkaline phosphatase in 5/2022.     The liver enzymes came down with time and normalized by 9/2022, but then increased again in 10/2022. Serologic evaluation for markers of chronic liver disease was positive for ASMA,     The most recent imaging of the liver was Ultrasound performed in 5/2022. results suggest that the liver is normal.      The patient has been treated with macrobid on and off over several years. The patient has the following symptoms which could be attributed to the liver disorder:    swelling of the hands,   Severe eczema    The patient is not experiencing the following symptoms which are commonly seen in this liver disorder:   pain in the right side over the liver,     The patient completes all daily activities without any functional limitations. ASSESSMENT AND PLAN:  Elevated liver enzymes  Persistent elevation in liver transaminases and alkaline phosphatase of unclear etiology at this time. Have performed laboratory testing to monitor liver function and degree of liver injury. This included BMP, hepatic panel,   CBC with platelet count, INR. Liver transaminases are elevated. ALP is elevated. Liver function is normal.  The platelet count is normal.      Serologic testing for causes of chronic liver disease was ordered. Result was positive for ASMA    The most likely causes for the liver chemistry abnormalities were discussed with the patient and include immune liver disorders,     The need to perform a liver biopsy to help determine the cause and severity of the liver test abnormalities was discussed. The risks of performing the liver biopsy including pain, puncture of the lung, gallbladder, intestine or kidney and bleeding were discussed. The patient has decided to have a liver biopsy. This will be scheduled. Elevation in Ferritin  There is an elevation in ferritin with Normal iron saturation. The patient does not have hemochromatosis and is unlikely to be a carrier for an HFE gene.   Suspect the elevation in ferritin reflects hepatic inflammation. Screening for Hepatocellular Carcinoma  HCC screening is not necessary if the patient has no evidence of cirrhosis. Treatment of other medical problems in patients with chronic liver disease  There are no contraindications for the patient to take most medications that are necessary for treatment of other medical issues. Counseling for alcohol in patients with chronic liver disease  The patient was counseled regarding alcohol consumption and the effect of alcohol on chronic liver disease. The patient does not consume any significant amount of alcohol. Vaccinations   Vaccination for viral hepatitis B is not needed. The patient has serologic evidence of prior exposure or vaccination with immunity. Routine vaccinations against other bacterial and viral agents can be performed as indicated. Annual flu vaccination should be administered if indicated. ALLERGIES  No Known Allergies    MEDICATIONS  Current Outpatient Medications   Medication Sig    ascorbic acid, vitamin C, (Vitamin C) 500 mg tablet Take 1,000 mg by mouth. ELDERBERRY FRUIT PO     tacrolimus (PROTOPIC) 0.1 % ointment APPLY TWICE DAILY TO ECZEMA DAILY ( OKAY TO USE ON ALL SKIN WHEN NOT BROKEN) (Patient not taking: Reported on 10/18/2022)     No current facility-administered medications for this visit. SYSTEM REVIEW NOT RELATED TO LIVER DISEASE OR REVIEWED ABOVE:  Constitution systems: Negative for fever, chills, weight gain, weight loss. Eyes: Negative for visual changes. ENT: Negative for sore throat, painful swallowing. Respiratory: Negative for cough, hemoptysis, SOB. Cardiology: Negative for chest pain, palpitations. GI:  Negative for constipation or diarrhea. : Negative for urinary frequency, dysuria, hematuria, nocturia. Skin: Negative for rash. Hematology: Negative for easy bruising, blood clots.     Musculo-skelatal: Negative for back pain, muscle pain, weakness. Neurologic: Negative for headaches, dizziness, vertigo, memory problems not related to HE. Psychology: Negative for anxiety, depression. FAMILY HISTORY:  The father  of heart disease. The mother Has/had the following chronic disease(s): DM, thyroid disease and  of old age. There is no family history of liver disease. SOCIAL HISTORY:  The patient is . The patient has 2 children, and 3 grandchildren. The patient has never used tobacco products. The patient has never consumed significant amounts of alcohol. The patient used to work as .      PHYSICAL EXAMINATION:  Visit Vitals  /69 (BP 1 Location: Right arm, BP Patient Position: Sitting, BP Cuff Size: Adult)   Pulse (!) 106   Temp 97.5 °F (36.4 °C) (Temporal)   Ht 5' 3\" (1.6 m)   Wt 136 lb 6.4 oz (61.9 kg)   SpO2 97%   BMI 24.16 kg/m²     General: No acute distress. Eyes: Sclera anicteric. ENT: No oral lesions. Thyroid normal.  Nodes: No adenopathy. Skin: No spider angiomata. No jaundice. No palmar erythema. Respiratory: Lungs clear to auscultation. Cardiovascular: Regular heart rate. No murmurs. No JVD. Abdomen: Soft non-tender. Liver size normal to percussion/palpation. Spleen not palpable. No obvious ascites. Extremities: No edema. No muscle wasting. No gross arthritic changes. Neurologic: Alert and oriented. Cranial nerves grossly intact. No asterixis.     LABORATORY STUDIES:  [facility] [place] of [address] [place] [address] & Units 10/18/2022 10/6/2022   WBC 3.6 - 11.0 K/uL 12.7 (H) 15.9 (H)   ANC 1.8 - 8.0 K/UL 10.1 (H) 13.7 (H)   HGB 11.5 - 16.0 g/dL 13.4 13.6    - 400 K/uL 251 191   INR 0.9 - 1.1   1.0 1.2 (H)   AST 15 - 37 U/L 85 (H) 397 (H)   ALT 12 - 78 U/L 157 (H) 574 (H)   Alk Phos 45 - 117 U/L 173 (H) 210 (H)   Bili, Total 0.2 - 1.0 MG/DL 1.1 (H) 1.7 (H)   Bili, Direct 0.0 - 0.2 MG/DL 0.6 (H)    Albumin 3.5 - 5.0 g/dL 3.3 (L) 3.6   BUN 6 - 20 MG/DL 11 13   Creat 0.55 - 1.02 MG/DL 0.63 0.75   Na 136 - 145 mmol/L 135 (L) 134 (L)   K 3.5 - 5.1 mmol/L 4.2 4.1   Cl 97 - 108 mmol/L 102 102   CO2 21 - 32 mmol/L 25 25   Glucose 65 - 100 mg/dL 132 (H) 236 (H)       SEROLOGIES:  Serologies Latest Ref Rng & Units 5/10/2022   Hep C Ab NONREACTIVE   NONREACTIVE     Serologies Latest Ref Rng & Units 10/18/2022 5/18/2022   Hep B Surface Ag Interp Negative    Negative   Hep B Core Ab, Total Negative    Negative   Hep B Surface Ab mIU/mL  11.40   Hep B Surface Ab Interp NONREACTIVE    REACTIVE (A)   Hep C Ab NONREACTIVE       Ferritin 26 - 388 NG/ (H)    Iron % Saturation 20 - 50 % 10 (L)    ARLEY, IFA  Negative    C-ANCA Neg:<1:20 titer <1:20    P-ANCA Neg:<1:20 titer <1:20    ANCA Neg:<1:20 titer <1:20    ASMCA 0 - 19 Units  22 (H)   M2 Ab 0.0 - 20.0 Units  <20.0   Alpha-1 antitrypsin level 101 - 187 mg/dL 272 (H)      LIVER HISTOLOGY:  Not available or performed    ENDOSCOPIC PROCEDURES:  Not available or performed    RADIOLOGY:  5/2022. Ultrasound of liver. Normal appearing liver. No liver mass lesions. OTHER TESTING:  Not available or performed    FOLLOW-UP:  All of the issues listed above in the Assessment and Plan were discussed with the patient. All questions were answered. The patient expressed a clear understanding of the above. [address] [address] in 2 weeks after liver biopsy.       Letha Rubio MD  [address] Jad Guillermo 7  Christy Lopes  22. [phone]  FAX:  [phone]

## 2022-10-18 NOTE — Clinical Note
11/7/2022    Patient: Shreya Cody   YOB: 1955   Date of Visit: 10/18/2022     MD Sherry Sykes 78  [address] [address]  Via [address] [facility] [address]    Dear John Jacobs MD,      Thank you for referring Ms. Stefanie De La Garza to [facility] for evaluation. My notes for this consultation are attached. If you have questions, please do not hesitate to call me. I look forward to following your patient along with you.       Sincerely,    Khalida Vragas MD

## 2022-10-19 LAB
ALBUMIN SERPL-MCNC: 3.3 G/DL (ref 3.5–5)
ALBUMIN/GLOB SERPL: 0.8 {RATIO} (ref 1.1–2.2)
ALP SERPL-CCNC: 173 U/L (ref 45–117)
ALT SERPL-CCNC: 157 U/L (ref 12–78)
ANION GAP SERPL CALC-SCNC: 8 MMOL/L (ref 5–15)
AST SERPL-CCNC: 85 U/L (ref 15–37)
BASOPHILS # BLD: 0.1 K/UL (ref 0–0.1)
BASOPHILS NFR BLD: 1 % (ref 0–1)
BILIRUB DIRECT SERPL-MCNC: 0.6 MG/DL (ref 0–0.2)
BILIRUB SERPL-MCNC: 1.1 MG/DL (ref 0.2–1)
BUN SERPL-MCNC: 11 MG/DL (ref 6–20)
BUN/CREAT SERPL: 17 (ref 12–20)
CALCIUM SERPL-MCNC: 9.1 MG/DL (ref 8.5–10.1)
CHLORIDE SERPL-SCNC: 102 MMOL/L (ref 97–108)
CO2 SERPL-SCNC: 25 MMOL/L (ref 21–32)
CREAT SERPL-MCNC: 0.63 MG/DL (ref 0.55–1.02)
DIFFERENTIAL METHOD BLD: ABNORMAL
EOSINOPHIL # BLD: 0.1 K/UL (ref 0–0.4)
EOSINOPHIL NFR BLD: 0 % (ref 0–7)
ERYTHROCYTE [DISTWIDTH] IN BLOOD BY AUTOMATED COUNT: 13.2 % (ref 11.5–14.5)
FERRITIN SERPL-MCNC: 436 NG/ML (ref 26–388)
GLOBULIN SER CALC-MCNC: 4.1 G/DL (ref 2–4)
GLUCOSE SERPL-MCNC: 132 MG/DL (ref 65–100)
HCT VFR BLD AUTO: 40.5 % (ref 35–47)
HGB BLD-MCNC: 13.4 G/DL (ref 11.5–16)
IMM GRANULOCYTES # BLD AUTO: 0.1 K/UL (ref 0–0.04)
IMM GRANULOCYTES NFR BLD AUTO: 1 % (ref 0–0.5)
INR PPP: 1 (ref 0.9–1.1)
IRON SATN MFR SERPL: 10 % (ref 20–50)
IRON SERPL-MCNC: 33 UG/DL (ref 35–150)
LYMPHOCYTES # BLD: 1.4 K/UL (ref 0.8–3.5)
LYMPHOCYTES NFR BLD: 11 % (ref 12–49)
MCH RBC QN AUTO: 29.7 PG (ref 26–34)
MCHC RBC AUTO-ENTMCNC: 33.1 G/DL (ref 30–36.5)
MCV RBC AUTO: 89.8 FL (ref 80–99)
MONOCYTES # BLD: 1 K/UL (ref 0–1)
MONOCYTES NFR BLD: 8 % (ref 5–13)
NEUTS SEG # BLD: 10.1 K/UL (ref 1.8–8)
NEUTS SEG NFR BLD: 79 % (ref 32–75)
NRBC # BLD: 0 K/UL (ref 0–0.01)
NRBC BLD-RTO: 0 PER 100 WBC
PLATELET # BLD AUTO: 251 K/UL (ref 150–400)
PMV BLD AUTO: 11.6 FL (ref 8.9–12.9)
POTASSIUM SERPL-SCNC: 4.2 MMOL/L (ref 3.5–5.1)
PROT SERPL-MCNC: 7.4 G/DL (ref 6.4–8.2)
PROTHROMBIN TIME: 10.9 SEC (ref 9–11.1)
RBC # BLD AUTO: 4.51 M/UL (ref 3.8–5.2)
SODIUM SERPL-SCNC: 135 MMOL/L (ref 136–145)
TIBC SERPL-MCNC: 316 UG/DL (ref 250–450)
WBC # BLD AUTO: 12.7 K/UL (ref 3.6–11)

## 2022-10-20 ENCOUNTER — HOSPITAL ENCOUNTER (OUTPATIENT)
Age: 67
Setting detail: OUTPATIENT SURGERY
Discharge: HOME | End: 2022-10-20
Attending: INTERNAL MEDICINE | Admitting: INTERNAL MEDICINE
Payer: MEDICARE

## 2022-10-20 ENCOUNTER — HOSPITAL ENCOUNTER (OUTPATIENT)
Dept: ULTRASOUND IMAGING | Age: 67
Discharge: HOME | End: 2022-10-20
Attending: INTERNAL MEDICINE
Payer: MEDICARE

## 2022-10-20 VITALS
DIASTOLIC BLOOD PRESSURE: 60 MMHG | BODY MASS INDEX: 24.26 KG/M2 | SYSTOLIC BLOOD PRESSURE: 128 MMHG | WEIGHT: 136.9 LBS | RESPIRATION RATE: 16 BRPM | HEART RATE: 88 BPM | HEIGHT: 63 IN | OXYGEN SATURATION: 99 %

## 2022-10-20 DIAGNOSIS — R74.8 ACID PHOSPHATASE ELEVATED: ICD-10-CM

## 2022-10-20 DIAGNOSIS — R74.8 ELEVATED LIVER ENZYMES: Primary | ICD-10-CM

## 2022-10-20 LAB — A1AT SERPL-MCNC: 272 MG/DL (ref 101–187)

## 2022-10-20 PROCEDURE — 77030013826 HC NDL BIOP MAXCOR BARD -B: Performed by: INTERNAL MEDICINE

## 2022-10-20 PROCEDURE — 76942 ECHO GUIDE FOR BIOPSY: CPT

## 2022-10-20 PROCEDURE — 76942 ECHO GUIDE FOR BIOPSY: CPT | Performed by: INTERNAL MEDICINE

## 2022-10-20 PROCEDURE — 77030014115: Performed by: INTERNAL MEDICINE

## 2022-10-20 PROCEDURE — 88313 SPECIAL STAINS GROUP 2: CPT

## 2022-10-20 PROCEDURE — 47000 NEEDLE BIOPSY OF LIVER PERQ: CPT | Performed by: INTERNAL MEDICINE

## 2022-10-20 PROCEDURE — 74011250636 HC RX REV CODE- 250/636: Performed by: INTERNAL MEDICINE

## 2022-10-20 PROCEDURE — 88307 TISSUE EXAM BY PATHOLOGIST: CPT

## 2022-10-20 PROCEDURE — 74011000250 HC RX REV CODE- 250: Performed by: INTERNAL MEDICINE

## 2022-10-20 PROCEDURE — 76040000019: Performed by: INTERNAL MEDICINE

## 2022-10-20 RX ORDER — FENTANYL CITRATE 50 UG/ML
25 INJECTION, SOLUTION INTRAMUSCULAR; INTRAVENOUS
Status: DISCONTINUED | OUTPATIENT
Start: 2022-10-20 | End: 2022-10-20 | Stop reason: HOSPADM

## 2022-10-20 RX ORDER — LIDOCAINE HYDROCHLORIDE 10 MG/ML
10 INJECTION INFILTRATION; PERINEURAL ONCE
Status: COMPLETED | OUTPATIENT
Start: 2022-10-20 | End: 2022-10-20

## 2022-10-20 RX ORDER — ONDANSETRON 2 MG/ML
4 INJECTION INTRAMUSCULAR; INTRAVENOUS
Status: DISCONTINUED | OUTPATIENT
Start: 2022-10-20 | End: 2022-10-20 | Stop reason: HOSPADM

## 2022-10-20 RX ORDER — SODIUM CHLORIDE 0.9 % (FLUSH) 0.9 %
5-40 SYRINGE (ML) INJECTION EVERY 8 HOURS
Status: DISCONTINUED | OUTPATIENT
Start: 2022-10-20 | End: 2022-10-20 | Stop reason: HOSPADM

## 2022-10-20 RX ORDER — SODIUM CHLORIDE 0.9 % (FLUSH) 0.9 %
5-40 SYRINGE (ML) INJECTION AS NEEDED
Status: DISCONTINUED | OUTPATIENT
Start: 2022-10-20 | End: 2022-10-20 | Stop reason: HOSPADM

## 2022-10-20 RX ADMIN — FENTANYL CITRATE 25 MCG: 50 INJECTION, SOLUTION INTRAMUSCULAR; INTRAVENOUS at 14:33

## 2022-10-20 NOTE — DISCHARGE INSTRUCTIONS
[facility], MD, FACP, [place], [place]      TYLER Adams S Ragini, [facility]-BC   Miguel Castellano, [facility]-   Betty Robertson, LUDWIG Miranda, [facility]       Martimin Isaacs [facility] 136    at [address], [address] Christy Willis  22.    [phone]    FAX: [address] [place] [address] [address], [address]    [phone]    FAX: [phone]         LIVER BIOPSY DISCHARGE INSTRUCTIONS      Peterson   [birth year]  Date: 10/20/2022    DIET:    Jane Gomez may resume your previous diet. ACTIVITIES:  Rest quietly the rest of today. You should not lift any objects more than 20 pounds for the next 2 days. If you work sitting down without strenuous activity you may return to work tomorrow. If you exert yourself or do heavy lifting at work you should take tomorrow off. Do not drive or operate hazardous machinery for 12 hours after you are discharged from this procedure. SPECIAL INSTRUCTIONS:  Do not use any aspirin or non-steroidal (Motin, Advil, Naproxen, etc) pain medications for the next 2 days. You may use extra-strength Tylenol (acetaminophen) if you experience pain or discomfort later today. Restarting blood thinners: If you were taking blood thinners prior to the procedure you can restart these in 2 days. Call the [facility] [address] office if you experience any of the following:  Persistent or severe abdominal pain. Persistent or severe abdominal distention. Fever and chills   Nausea and vomiting. New or unusual symptoms. Follow-up care: You should have a follow up appointment with Dr. Fredrick Hamilton to review the results of the liver biopsy results in 2 weeks.   If you do not have an appointment please call the office at the number listed above to schedule this. Other instructions: If you have any problems or questions call the [facility] office at the phone number listed above. DISCHARGE SUMMARY from Nurse: The following personal items collected during your admission are returned to you:   Dental Appliance: Dental Appliances: None  Vision: Visual Aid: At home  Hearing Aid:    Jewelry:    Clothing:    Other Valuables:    Valuables sent to safe:            Learning About Coronavirus (COVID-19)  Coronavirus (COVID-19): Overview  What is coronavirus ([id number]-69)? The coronavirus disease (COVID-19) is caused by a virus. It is an illness that was first found in [place], [place], in December 2019. It has since spread worldwide. The virus can cause fever, cough, and trouble breathing. In severe cases, it can cause pneumonia and make it hard to breathe without help. It can cause death. Coronaviruses are a large group of viruses. They cause the common cold. They also cause more serious illnesses like Middle East respiratory syndrome (MERS) and severe acute respiratory syndrome (SARS). COVID-19 is caused by a novel coronavirus. That means it's a new type that has not been seen in people before. This virus spreads person-to-person through droplets from coughing and sneezing. It can also spread when you are close to someone who is infected. And it can spread when you touch something that has the virus on it, such as a doorknob or a tabletop. What can you do to protect yourself from coronavirus (COVID-19)? The best way to protect yourself from getting sick is to: Avoid areas where there is an outbreak. Avoid contact with people who may be infected. Wash your hands often with soap or alcohol-based hand sanitizers. Avoid crowds and try to stay at least 6 feet away from other people. Wash your hands often, especially after you cough or sneeze.  Use soap and water, and scrub for at least 20 seconds. If soap and water aren't available, use an alcohol-based hand . Avoid touching your mouth, nose, and eyes. What can you do to avoid spreading the virus to others? To help avoid spreading the virus to others:  Cover your mouth with a tissue when you cough or sneeze. Then throw the tissue in the trash. Use a disinfectant to clean things that you touch often. Stay home if you are sick or have been exposed to the virus. Don't go to school, work, or public areas. And don't use public transportation. If you are sick:  Leave your home only if you need to get medical care. But call the doctor's office first so they know you're coming. And wear a face mask, if you have one. If you have a face mask, wear it whenever you're around other people. It can help stop the spread of the virus when you cough or sneeze. Clean and disinfect your home every day. Use household  and disinfectant wipes or sprays. Take special care to clean things that you grab with your hands. These include doorknobs, remote controls, phones, and handles on your refrigerator and microwave. And don't forget countertops, tabletops, bathrooms, and computer keyboards. When to call for help  Call 911 anytime you think you may need emergency care. For example, call if:  You have severe trouble breathing. (You can't talk at all.)  You have constant chest pain or pressure. You are severely dizzy or lightheaded. You are confused or can't think clearly. Your face and lips have a blue color. You pass out (lose consciousness) or are very hard to wake up. Call your doctor now if you develop symptoms such as:  Shortness of breath. Fever. Cough. If you need to get care, call ahead to the doctor's office for instructions before you go. Make sure you wear a face mask, if you have one, to prevent exposing other people to the virus. Where can you get the latest information?   The following health organizations are tracking and studying this virus. Their websites contain the most up-to-date information. Nusrat Morris also learn what to do if you think you may have been exposed to the virus. U.S. Centers for Disease Control and Prevention (CDC): The CDC provides updated news about the disease and travel advice. The website also tells you how to prevent the spread of infection. www.cdc.gov  World Health Organization [facility]): WHO offers information about the virus outbreaks. WHO also has travel advice. www.who.int  Current as of: April 1, 2020               Content Version: 12.4  © 2006-2020 Healthwise, Incorporated. Care instructions adapted under license by your healthcare professional. If you have questions about a medical condition or this instruction, always ask your healthcare professional. [address] any warranty or liability for your use of this information.

## 2022-10-20 NOTE — H&P
[facility], MD, FACP, [place], [place]      TYLER Diaz AGPCNP-BC Doy Frames, [facility]   LUDWIG Shaikh FNP-C Royce Blue, TARIQ-BC       [facility] 136    at [address]    [address] [place] [address], [facility] Christy Willis  22.    [phone]    FAX: [address] [place] [address], [address]    [phone]    FAX: [phone]         PRE-PROCEDURE NOTE - LIVER BIOPSY    H and P from last office visit reviewed. Allergies reviewed. Out-patient medication list reviewed. Patient Active Problem List   Diagnosis Code    Osteopenia M85.80    Parkinson's disease ([facility] G20    Eczema L30.9    Vitamin D deficiency E55.9    Elevated liver enzymes R74.8       No Known Allergies    No current facility-administered medications on file prior to encounter. Current Outpatient Medications on File Prior to Encounter   Medication Sig Dispense Refill    ascorbic acid, vitamin C, (VITAMIN C) 500 mg tablet Take 1,000 mg by mouth. tacrolimus (PROTOPIC) 0.1 % ointment       ELDERBERRY FRUIT PO          For liver biopsy to assess Abnormal liver enzymes. The risks of the procedure were discussed with the patient. This included bleeding, pain, and puncture of other organs. All questions were answered. The patient wishes to proceed with the procedure. The patient was counseled at length about the risks of praveen Covid-19 in the jose-operative and post-operative states including the recovery window of their procedure.   The patient was made aware that praveen Covid-19 after a surgical procedure may worsen their prognosis for recovering from the virus and lend to a higher morbidity and or mortality risk. The patient was given the options of postponing their procedure. All of the risks, benefits, and alternatives were discussed. The patient does  wish to proceed with the procedure. PHYSICAL EXAMINATION:  Visit Vitals  /85   Pulse (!) 102   Resp 18   Ht 5' 3\" (1.6 m)   Wt 136 lb 14.4 oz (62.1 kg)   SpO2 98%   Breastfeeding No   BMI 24.25 kg/m²       General: No acute distress. Eyes: Sclera anicteric. ENT: No oral lesions. Thyroid normal.  Nodes: No adenopathy. Skin: No spider angiomata. No jaundice. No palmar erythema. Respiratory: Lungs clear to auscultation. Cardiovascular: Regular heart rate. No murmurs. No JVD. Abdomen: Soft non-tender, liver size normal to percussion/palpation. Spleen not palpable. No obvious ascites. Extremities: No edema. No muscle wasting. No gross arthritic changes. Neurologic: Alert and oriented. Cranial nerves grossly intact. No asterixis. LABS:  Lab Results   Component Value Date/Time    WBC 12.7 (H) 10/18/2022 03:18 PM    HGB 13.4 10/18/2022 03:18 PM    HCT 40.5 10/18/2022 03:18 PM    PLATELET 700 83/02/8460 03:18 PM    MCV 89.8 10/18/2022 03:18 PM     Lab Results   Component Value Date/Time    INR 1.0 10/18/2022 03:18 PM    INR 1.2 (H) 10/06/2022 03:21 PM    Prothrombin time 10.9 10/18/2022 03:18 PM    Prothrombin time 12.2 (H) 10/06/2022 03:21 PM       ASSESSMENT AND PLAN:  Liver biopsy under ultrasound guidance.     Khalida Vargas MD  [address] [facility] [place]  Jad Moon   Deanna Lopeshuanfabrizio  22. [phone]  FAX:  [phone]

## 2022-10-21 LAB
ANA TITR SER IF: NEGATIVE {TITER}
C-ANCA TITR SER IF: NORMAL TITER
P-ANCA ATYPICAL TITR SER IF: NORMAL TITER
P-ANCA TITR SER IF: NORMAL TITER

## 2022-10-27 ENCOUNTER — TELEPHONE (OUTPATIENT)
Dept: HEMATOLOGY | Age: 67
End: 2022-10-27

## 2022-10-27 NOTE — TELEPHONE ENCOUNTER
Patient's daughter called back regarding LBX results as provider indicated at last visit that he may want to start treatment sooner based on those results.

## 2022-11-11 ENCOUNTER — DOCUMENTATION ONLY (OUTPATIENT)
Dept: HEMATOLOGY | Age: 67
End: 2022-11-11

## 2022-11-11 ENCOUNTER — OFFICE VISIT (OUTPATIENT)
Dept: HEMATOLOGY | Age: 67
End: 2022-11-11
Payer: MEDICARE

## 2022-11-11 ENCOUNTER — PATIENT MESSAGE (OUTPATIENT)
Dept: HEMATOLOGY | Age: 67
End: 2022-11-11

## 2022-11-11 VITALS
HEIGHT: 63 IN | WEIGHT: 137 LBS | BODY MASS INDEX: 24.27 KG/M2 | RESPIRATION RATE: 18 BRPM | TEMPERATURE: 97.2 F | SYSTOLIC BLOOD PRESSURE: 158 MMHG | HEART RATE: 95 BPM | OXYGEN SATURATION: 98 % | DIASTOLIC BLOOD PRESSURE: 93 MMHG

## 2022-11-11 DIAGNOSIS — R74.8 ELEVATED LIVER ENZYMES: Primary | ICD-10-CM

## 2022-11-11 DIAGNOSIS — F41.8 TEST ANXIETY: ICD-10-CM

## 2022-11-11 PROCEDURE — G8399 PT W/DXA RESULTS DOCUMENT: HCPCS | Performed by: INTERNAL MEDICINE

## 2022-11-11 PROCEDURE — 99214 OFFICE O/P EST MOD 30 MIN: CPT | Performed by: INTERNAL MEDICINE

## 2022-11-11 PROCEDURE — 1123F ACP DISCUSS/DSCN MKR DOCD: CPT | Performed by: INTERNAL MEDICINE

## 2022-11-11 PROCEDURE — G8510 SCR DEP NEG, NO PLAN REQD: HCPCS | Performed by: INTERNAL MEDICINE

## 2022-11-11 PROCEDURE — 1101F PT FALLS ASSESS-DOCD LE1/YR: CPT | Performed by: INTERNAL MEDICINE

## 2022-11-11 PROCEDURE — G9899 SCRN MAM PERF RSLTS DOC: HCPCS | Performed by: INTERNAL MEDICINE

## 2022-11-11 PROCEDURE — G8427 DOCREV CUR MEDS BY ELIG CLIN: HCPCS | Performed by: INTERNAL MEDICINE

## 2022-11-11 PROCEDURE — 3017F COLORECTAL CA SCREEN DOC REV: CPT | Performed by: INTERNAL MEDICINE

## 2022-11-11 PROCEDURE — G8536 NO DOC ELDER MAL SCRN: HCPCS | Performed by: INTERNAL MEDICINE

## 2022-11-11 PROCEDURE — G8420 CALC BMI NORM PARAMETERS: HCPCS | Performed by: INTERNAL MEDICINE

## 2022-11-11 PROCEDURE — 1090F PRES/ABSN URINE INCON ASSESS: CPT | Performed by: INTERNAL MEDICINE

## 2022-11-11 RX ORDER — DIAZEPAM 2 MG/1
2 TABLET ORAL
Qty: 3 TABLET | Refills: 0 | Status: SHIPPED | OUTPATIENT
Start: 2022-11-11

## 2022-11-11 NOTE — PROGRESS NOTES
Central scheduling called regarding MRI MRCP order placed. Requested for this to be updated per patient request to be in the [place] area. Notified provider verbally regarding update to scan order.

## 2022-11-11 NOTE — PROGRESS NOTES
Identified pt with two pt identifiers(name and ). Reviewed record in preparation for visit and have obtained necessary documentation. Chief Complaint   Patient presents with    Follow-up      Vitals:    22 1143 22 1146   BP: (!) 159/82 (!) 158/93   Pulse: 95    Resp: 18    Temp: 97.2 °F (36.2 °C)    TempSrc: Temporal    SpO2: 98%    Weight: 137 lb (62.1 kg)    Height: 5' 3\" (1.6 m)    PainSc:   0 - No pain        Health Maintenance Review: Patient reminded of \"due or due soon\" health maintenance. I have asked the patient to contact his/her primary care provider (PCP) for follow-up on his/her health maintenance. Coordination of Care Questionnaire:  :   1) Have you been to an emergency room, urgent care, or hospitalized since your last visit? If yes, where when, and reason for visit? no       2. Have seen or consulted any other health care provider since your last visit? If yes, where when, and reason for visit? NO      Patient is accompanied by daughter I have received verbal consent from [facility] to discuss any/all medical information while they are present in the room.

## 2022-11-11 NOTE — TELEPHONE ENCOUNTER
----- Message from [facility] sent at 11/11/2022  2:29 PM EST -----  Regarding: MRCP scheduling  We attempted to contact scheduling for this procedure but the call center stated that the only locations that were allowed for this test were [facility] and Hospitals in [place]. Is there anyway that the order can be updated for the [place] or [facility] locations for the MRCP? Thank you      [email or web address] Spoke w/patient concerning above message. Scarlett Wilson has placed a new order in the system. Patient can now schedule her MRI. Patient verbalize understanding. (KF)

## 2022-11-11 NOTE — PROGRESS NOTES
[address] [facility], MD, [address], [place], [place]      TYLER Stacy, [facility]   Regina Villavicencio, [facility]   Sugar Hubbard, FNP-C   Bobby Cuadra, FNP-C    Jaynee Castleman, [facility]       Marti Emmanuelkory Luis De [address]acia 136    at [address], [facility] Christy Willis  22.    [phone]    FAX: [address] [place] [address]    at [address], [address]    [phone]    FAX: [phone]       Patient Care Team:  Alpa Nair MD as PCP - General (Internal Medicine Physician)  Alpa Nair MD as PCP - [address] [place] [address] Provider  Fantasma Browning MD (Gastroenterology)      Problem List  Date Reviewed: 11/7/2022            Codes Class Noted    Osteopenia ICD-10-CM: M85.80  ICD-9-CM: 733.90  9/20/2017        Elevated liver enzymes ICD-10-CM: R74.8  ICD-9-CM: 790.5  10/18/2022        Vitamin D deficiency ICD-10-CM: E55.9  ICD-9-CM: 268.9  Unknown        Park[facility]'s disease ([facility] ICD-10-CM: G20  ICD-9-CM: 332.0  2019    Overview Signed 5/10/2022 12:04 PM by Alpa Nair MD     L handed tremor; no Rx as of 5/22             Eczema ICD-10-CM: L30.9  ICD-9-CM: 692.9  2019    Overview Signed 5/10/2022 12:05 PM by Alpa Nair MD     Prev numerous dermatology referrals; currently under care with homeopathy                  Randal Soliman is being seen at [address] for management of elevated liver enzymes. The active problem list, all pertinent past medical history, medications, liver histology, radiologic findings and laboratory findings related to the liver disorder were reviewed and discussed with the patient. The patient is a 75 y.o.   female who was found to have liver transaminases and alkaline phosphatase in 5/2022. The liver enzymes came down with time and normalized by 9/2022, but then increased again in 10/2022. Serologic evaluation for markers of chronic liver disease was positive for ASMA,     The most recent imaging of the liver was Ultrasound performed in 5/2022. results suggest that the liver is normal.      The patient underwent a liver biopsy in 10/2022. The procedure was well tolerated. I have personally reviewed and interpreted the liver biopsy slides. This demonstrates mild portal inflammation, and portal fibrosis that is greater than would be expected given the degree of inflammation    The patient has the following symptoms which could be attributed to the liver disorder:    swelling of the hands,   Severe eczema    The patient is not experiencing the following symptoms which are commonly seen in this liver disorder:   pain in the right side over the liver,     The patient completes all daily activities without any functional limitations. ASSESSMENT AND PLAN:  Elevated liver enzymes  Persistent elevation in liver transaminases and alkaline phosphatase of unclear etiology at this time. A liver biopsy performed in 10/2022 demonstrates mild portal inflammation, mild PMN, no lobular inflammation, no steatosis, and stage 1 fibrosis portal that is greater than the degree of inflammation. .      Liver transaminases are elevated. ALP is elevated. Liver function is normal.  The platelet count is normal.      Serologic testing for causes of chronic liver disease was ordered. Result was positive for ASMA    The most likely causes for the liver chemistry abnormalities were discussed with the patient and include immune liver disorders,     Will perform imaging of the liver with MRI and MRCP because of persistent elevation in ALP and possible bile duct disease. Elevation in Ferritin  There is an elevation in ferritin with Normal iron saturation.     The patient does not have hemochromatosis and is unlikely to be a carrier for an HFE gene. Suspect the elevation in ferritin reflects hepatic inflammation. Screening for Hepatocellular Carcinoma  HCC screening is not necessary if the patient has no evidence of cirrhosis. Treatment of other medical problems in patients with chronic liver disease  There are no contraindications for the patient to take most medications that are necessary for treatment of other medical issues. Counseling for alcohol in patients with chronic liver disease  The patient was counseled regarding alcohol consumption and the effect of alcohol on chronic liver disease. The patient does not consume any significant amount of alcohol. Vaccinations   Vaccination for viral hepatitis B is not needed. The patient has serologic evidence of prior exposure or vaccination with immunity. Routine vaccinations against other bacterial and viral agents can be performed as indicated. Annual flu vaccination should be administered if indicated. ALLERGIES  No Known Allergies    MEDICATIONS  Current Outpatient Medications   Medication Sig    ascorbic acid, vitamin C, (VITAMIN C) 500 mg tablet Take 1,000 mg by mouth. tacrolimus (PROTOPIC) 0.1 % ointment     ELDERBERRY FRUIT PO      No current facility-administered medications for this visit. SYSTEM REVIEW NOT RELATED TO LIVER DISEASE OR REVIEWED ABOVE:  Constitution systems: Negative for fever, chills, weight gain, weight loss. Eyes: Negative for visual changes. ENT: Negative for sore throat, painful swallowing. Respiratory: Negative for cough, hemoptysis, SOB. Cardiology: Negative for chest pain, palpitations. GI:  Negative for constipation or diarrhea. : Negative for urinary frequency, dysuria, hematuria, nocturia. Skin: Negative for rash. Hematology: Negative for easy bruising, blood clots. Musculo-skelatal: Negative for back pain, muscle pain, weakness.   Neurologic: Negative for headaches, dizziness, vertigo, memory problems not related to HE. Psychology: Negative for anxiety, depression. FAMILY HISTORY:  The father  of heart disease. The mother Has/had the following chronic disease(s): DM, thyroid disease and  of old age. There is no family history of liver disease. SOCIAL HISTORY:  The patient is . The patient has 2 children, and 3 grandchildren. The patient has never used tobacco products. The patient has never consumed significant amounts of alcohol. The patient used to work as .      PHYSICAL EXAMINATION:  Visit Vitals  BP (!) 158/93 (BP 1 Location: Left upper arm, BP Patient Position: Sitting, BP Cuff Size: Adult)   Pulse 95   Temp 97.2 °F (36.2 °C) (Temporal)   Resp 18   Ht 5' 3\" (1.6 m)   Wt 137 lb (62.1 kg)   SpO2 98%   BMI 24.27 kg/m²     General: No acute distress. Eyes: Sclera anicteric. ENT: No oral lesions. Thyroid normal.  Nodes: No adenopathy. Skin: No spider angiomata. No jaundice. No palmar erythema. Respiratory: Lungs clear to auscultation. Cardiovascular: Regular heart rate. No murmurs. No JVD. Abdomen: Soft non-tender. Liver size normal to percussion/palpation. Spleen not palpable. No obvious ascites. Extremities: No edema. No muscle wasting. No gross arthritic changes. Neurologic: Alert and oriented. Cranial nerves grossly intact. No asterixis.     LABORATORY STUDIES:  [facility] [address] & Units 10/18/2022 10/6/2022   WBC 3.6 - 11.0 K/uL 12.7 (H) 15.9 (H)   ANC 1.8 - 8.0 K/UL 10.1 (H) 13.7 (H)   HGB 11.5 - 16.0 g/dL 13.4 13.6    - 400 K/uL 251 191   INR 0.9 - 1.1   1.0 1.2 (H)   AST 15 - 37 U/L 85 (H) 397 (H)   ALT 12 - 78 U/L 157 (H) 574 (H)   Alk Phos 45 - 117 U/L 173 (H) 210 (H)   Bili, Total 0.2 - 1.0 MG/DL 1.1 (H) 1.7 (H)   Bili, Direct 0.0 - 0.2 MG/DL 0.6 (H)    Albumin 3.5 - 5.0 g/dL 3.3 (L) 3.6   BUN 6 - 20 MG/DL 11 13   Creat 0.55 - 1.02 MG/DL 0.63 0.75   Na 136 - 145 mmol/L 135 (L) 134 (L)   K 3.5 - 5.1 mmol/L 4.2 4.1   Cl 97 - 108 mmol/L 102 102   CO2 21 - 32 mmol/L 25 25   Glucose 65 - 100 mg/dL 132 (H) 236 (H)       SEROLOGIES:  Serologies Latest Ref Rng & Units 5/10/2022   Hep C Ab NONREACTIVE   NONREACTIVE     Serologies Latest Ref Rng & Units 10/18/2022 5/18/2022   Hep B Surface Ag Interp Negative    Negative   Hep B Core Ab, Total Negative    Negative   Hep B Surface Ab mIU/mL  11.40   Hep B Surface Ab Interp NONREACTIVE    REACTIVE (A)   Hep C Ab NONREACTIVE       Ferritin 26 - 388 NG/ (H)    Iron % Saturation 20 - 50 % 10 (L)    ARLEY, IFA  Negative    C-ANCA Neg:<1:20 titer <1:20    P-ANCA Neg:<1:20 titer <1:20    ANCA Neg:<1:20 titer <1:20    ASMCA 0 - 19 Units  22 (H)   M2 Ab 0.0 - 20.0 Units  <20.0   Alpha-1 antitrypsin level 101 - 187 mg/dL 272 (H)      LIVER HISTOLOGY:  Not available or performed    ENDOSCOPIC PROCEDURES:  Not available or performed    RADIOLOGY:  5/2022. Ultrasound of liver. Normal appearing liver. No liver mass lesions. OTHER TESTING:  Not available or performed    FOLLOW-UP:  All of the issues listed above in the Assessment and Plan were discussed with the patient. All questions were answered. The patient expressed a clear understanding of the above. [address] [address] in 2 weeks after liver biopsy.       Jenny Antoine MD  [address] Jad Guillermo [address] [place] [address], [facility]. [phone]  FAX:  [address] [place] [address]

## 2022-11-11 NOTE — Clinical Note
11/29/2022    Patient: Tisha Alas   YOB: 1955   Date of Visit: 11/11/2022     MD Sherry Soria Jitendra Elle [address]  [address] [id number]  [facility] [facility] [address]    Dear Mae Murry MD,      Thank you for [facility]. My notes for this consultation are attached. If you have questions, please do not hesitate to call me. I look forward to following your patient along with you.       Sincerely,    Wilberto Abdi MD

## 2022-11-29 NOTE — PROGRESS NOTES
ICD-10-CM ICD-9-CM    1. Chronic active hepatitis ([facility]  K73.2 571.49       2. Parkinson's disease ([facility]  G20 332.0       3. Tachycardia  R00.0 785.0                Subjective:     Chief Complaint   Patient presents with    Follow-up       Tobias Bird is a 75 y.o. F.  Has a past medical history of hypercholesterolemia and prediabetes. I reviewed and updated the medical record. I saw this patient most recently in late October for follow-up on her chronic medical concerns. At the time, she complained of right-sided hand pain and worsening fatigue. She had previously undergone colonoscopy in September by gastroenterology which had apparently been unremarkable and was noted to be pending follow-up with her hepatologist.  Physical examination at the time had been notable mainly for mild tachycardia, but was otherwise generally unremarkable except for swelling and tenderness of her right hand. She was felt likely to have an inflammatory arthritis, and she had been noted to have worsening transaminitis, for which she was advised strongly to follow-up with hepatology. She was seen earlier in November by her hepatologist.  Liver biopsy had been performed in October demonstrated mild portal inflammation, and stage I portal fibrosis. MRCP was ordered and done 01 Dec; this showed no PSC or other findings. ASMA was noted to be elevated. Elevated ferritin levels were felt to be likely secondary to her history of liver disease and hepatic inflammation. Liver biopsy was scheduled. Fibroscan is pending. Today, the patient comes in for routine follow-up. Since her last visit, she had undergone the liver biopsy as noted above, and is pending follow-up with her hepatologist later this week. She otherwise reports feeling fine without any other changes into her skin color, change in stool color, or changes in energy level, or any abdominal distention. No abdominal pain.   No fevers or chills or other constitutional complaints. At this point, she says, that her hepatologist now believes that her chronic active hepatitis/transaminitis is likely secondary to an as of yet unidentified autoimmune process. I note that previous studies had demonstrated mildly elevated ferritin but otherwise negative testing for ASMA and other serologies. She is not currently using any other medications for this at this time. Other than this, she continues to be followed by her neurologist for her history of Parkinson's disease. She says that now that she has been doing better with regard to her liver, she will be following up with neurologist in the coming weeks and likely starting a medication such as Sinemet for her Parkinson's disease. Overall, the symptoms appear to be otherwise well controlled at this time, with her only symptom being continued baseline resting tremor, without any difficulty so far with gait, falls, or other problems. She is noted to have a slightly shuffling gait on examination today, but otherwise is not unsteady without any loss of balance. She is noted to have tachycardia today. She has been tachycardic at prior visits, but she otherwise denies having had any chest pain, shortness breath, orthopnea, paroxysmal nocturnal dyspnea, or any other cardiopulmonary symptoms. Her review of systems is otherwise negative. Routine Healthcare Maintenance issues are reviewed and discussed with the patient as noted below. Orders to update gaps in healthcare maintenance were placed as noted below in the Assessment and Plan, where applicable.      Past Medical History:  Past Medical History:   Diagnosis Date    Allergic rhinitis 09/20/2017    Chronic active hepatitis [facility]) 10/2022    10/22 - Biopsy proven    Diverticulosis 09/2022    CSP finding    Eczema 2019    Prev numerous dermatology referrals; currently under care with homeopathy    History of gallstones 05/2022    Hypercholesterolemia 05/2022    Internal hemorrhoids 2022    CSP finding    Osteoarthritis     Osteopenia 2017    Parkinson's disease ([facility]     L handed tremor; no Rx as of ; Neurology Following    Portal fibrosis without cirrhosis 10/2022    s/p Liver bx    Prediabetes 2022    A1C  = 6%    Vitamin D deficiency        Past Surgical Histor:  Past Surgical History:   Procedure Laterality Date    COLONOSCOPY N/A 2022    COLONOSCOPY performed by John Zimmer MD at [facility] ENDOSCOPY    HX  SECTION      x 1    HX HEENT      deviated septum repair    HX HYSTERECTOMY      HX OTHER SURGICAL  2008    colonoscopy    HX TONSILLECTOMY      HX UROLOGICAL      bladder sling       Allergies:  No Known Allergies    Medications:  Current Outpatient Medications   Medication Sig Dispense Refill    calcium-cholecalciferol, D3, (CALTRATE 600+D) tablet Take 1 Tablet by mouth daily. ascorbic acid, vitamin C, (VITAMIN C) 500 mg tablet Take 1,000 mg by mouth.       tacrolimus (PROTOPIC) 0.1 % ointment       ELDERBERRY FRUIT PO          Social History:  Social History     Socioeconomic History    Marital status:    Tobacco Use    Smoking status: Never    Smokeless tobacco: Never   Vaping Use    Vaping Use: Never used   Substance and Sexual Activity    Alcohol use: No    Drug use: No       Family History:  Family History   Problem Relation Age of Onset    Elevated Lipids Mother     Cancer Mother         breast    Hypertension Father     Diabetes Father     Macular Degen Father     Heart Disease Father        Immunizations:  Immunization History   Administered Date(s) Administered    Influenza Vaccine 10/01/2018    Influenza Vaccine PF 10/26/2018    Influenza, FLUARIX, FLULAVAL, FLUZONE (age 10 mo+) AND AFLURIA, (age 1 y+), PF, 0.5mL 10/18/2016, 10/20/2017        Healthcare Maintenance:  Health Maintenance   Topic Date Due    COVID-19 Vaccine (1) Never done    Pneumococcal 65+ years (1 - PCV) Never done    Hepatitis B Vaccine (1 of 3 - Risk 3-dose series) Never done    DTaP/Tdap/Td series (1 - Tdap) Never done    Shingrix Vaccine Age 50> (1 of 2) Never done    Flu Vaccine (1) 08/01/2022    Medicare Yearly Exam  05/11/2023    A1C test (Diabetic or Prediabetic)  10/06/2023    Depression Screen  11/11/2023    Breast Cancer Screen Mammogram  11/29/2023    Lipid Screen  05/10/2027    Colorectal Cancer Screening Combo  08/19/2032    Hepatitis C Screening  Completed    Bone Densitometry (Dexa) Screening  Completed        Review of Systems:  ROS:  Review of Systems   Constitutional: Negative. HENT: Negative. Eyes: Negative. Respiratory: Negative. Cardiovascular: Negative. Gastrointestinal: Negative. Genitourinary: Negative. Musculoskeletal: Negative. Skin:  Positive for rash (chronic eczema). Neurological:  Positive for tremors (baseline PD). Endo/Heme/Allergies: Negative. Psychiatric/Behavioral: Negative. ROS otherwise negative      Objective:     Vital Signs:  Visit Vitals  /70 (BP 1 Location: Right arm, BP Patient Position: Sitting, BP Cuff Size: Adult)   Pulse (!) 115   Resp 18   Ht 5' 3\" (1.6 m)   Wt 136 lb 11.2 oz (62 kg)   SpO2 97%   BMI 24.22 kg/m²       BMI:  Body mass index is 24.22 kg/m². Physical Examination:  Physical Exam  Constitutional:       Appearance: Normal appearance. She is normal weight. HENT:      Head: Normocephalic and atraumatic. Right Ear: External ear normal.      Left Ear: External ear normal.      Nose: Nose normal.      Mouth/Throat:      Mouth: Mucous membranes are moist.      Pharynx: Oropharynx is clear. No oropharyngeal exudate or posterior oropharyngeal erythema. Cardiovascular:      Rate and Rhythm: Regular rhythm. Tachycardia present. Pulses: Normal pulses. Heart sounds: Normal heart sounds. No murmur heard. No friction rub. No gallop. Pulmonary:      Effort: Pulmonary effort is normal. No respiratory distress.       Breath sounds: Normal breath sounds. No wheezing, rhonchi or rales. Abdominal:      General: Abdomen is flat. Bowel sounds are normal. There is no distension. Palpations: Abdomen is soft. Tenderness: There is no abdominal tenderness. There is no guarding. Musculoskeletal:         General: No swelling, tenderness or deformity. Normal range of motion. Cervical back: Normal range of motion and neck supple. No rigidity or tenderness. Skin:     General: Skin is warm and dry. Findings: Rash (scattered across chest, face, baseline per patient) present. No erythema or lesion. Neurological:      General: No focal deficit present. Mental Status: She is alert and oriented to person, place, and time. Mental status is at baseline. Sensory: No sensory deficit. Motor: No weakness. Gait: Gait normal.      Deep Tendon Reflexes: Reflexes normal.      Comments: L > R resting tremor c/w PD   Psychiatric:         Mood and Affect: Mood normal.         Behavior: Behavior normal.         Judgment: Judgment normal.        Physical exam otherwise negative    Diagnostic Testing:    Laboratory Studies:  Office Visit on 10/18/2022   Component Date Value Ref Range Status    Cytoplasmic (C-ANCA) Ab 10/18/2022 <1:20  Neg:<1:20 titer Final    Perinuclear (P-ANCA) 10/18/2022 <1:20  Neg:<1:20 titer Final    Comment: (NOTE)  The presence of positive fluorescence exhibiting P-ANCA or C-ANCA  patterns alone is not specific for the diagnosis of Wegener's  Granulomatosis (WG) or microscopic polyangiitis. Decisions about  treatment should not be based solely on ANCA IFA results. The  International ANCA Group Consensus recommends follow up testing of  positive sera with both ME-3 and MPO-ANCA enzyme immunoassays. As  many as 5% serum samples are positive only by EIA. Ref. AM J Clin Pathol 1999;111:507-513.       Atypical pANCA 10/18/2022 <1:20  Neg:<1:20 titer Final    Comment: (NOTE)  The atypical pANCA pattern has been observed in a significant  percentage of patients with ulcerative colitis, primary sclerosing  cholangitis and autoimmune hepatitis. Performed At: [facility]  [facility] [address] [place] [id number]  Neil Mohs MD AN:[id number]      Antinuclear Abs, IFA 10/18/2022 Negative    Final    Comment: (NOTE)                                      Negative   <1:80                                      Borderline  1:80                                      Positive   >1:80  ICAP nomenclature: AC-0  For more information about Hep-2 cell patterns use  ANApatterns. org, the official website for the International  Consensus on Antinuclear Antibody (ARLEY) Patterns (ICAP). Performed At: [facility] [id number]  Neil Mohs MD SZ:[id number]      Alpha-1 Antitrypsin 10/18/2022 272 (A)  101 - 187 mg/dL Final    Comment: (NOTE)  Performed At: [place] [address]  [facility] [id number]  Neil Mohs MD NQ:[id number]      Iron 10/18/2022 33 (A)  35 - 150 ug/dL Final    TIBC 10/18/2022 316  250 - 450 ug/dL Final    Iron % saturation 10/18/2022 10 (A)  20 - 50 % Final    Ferritin 10/18/2022 436 (A)  26 - 388 NG/ML Final    INR 10/18/2022 1.0  0.9 - 1.1   Final    A single therapeutic range for Vit K antagonists may not be optimal for all indications - see June, 2008 issue of Chest, American College of Chest Physicians Evidence-Based Clinical Practice Guidelines, 8th Edition.     Prothrombin time 10/18/2022 10.9  9.0 - 11.1 sec Final    WBC 10/18/2022 12.7 (A)  3.6 - 11.0 K/uL Final    RBC 10/18/2022 4.51  3.80 - 5.20 M/uL Final    HGB 10/18/2022 13.4  11.5 - 16.0 g/dL Final    HCT 10/18/2022 40.5  35.0 - 47.0 % Final    MCV 10/18/2022 89.8  80.0 - 99.0 FL Final    MCH 10/18/2022 29.7  26.0 - 34.0 PG Final    MCHC 10/18/2022 33.1  30.0 - 36.5 g/dL Final    RDW 10/18/2022 13.2  11.5 - 14.5 % Final    PLATELET 77/02/8912 990  150 - 400 K/uL Final    MPV 10/18/2022 11.6  8.9 - 12.9 FL Final    NRBC 10/18/2022 0.0  0  WBC Final    ABSOLUTE NRBC 10/18/2022 0.00  0.00 - 0.01 K/uL Final    NEUTROPHILS 10/18/2022 79 (A)  32 - 75 % Final    LYMPHOCYTES 10/18/2022 11 (A)  12 - 49 % Final    MONOCYTES 10/18/2022 8  5 - 13 % Final    EOSINOPHILS 10/18/2022 0  0 - 7 % Final    BASOPHILS 10/18/2022 1  0 - 1 % Final    IMMATURE GRANULOCYTES 10/18/2022 1 (A)  0.0 - 0.5 % Final    ABS. NEUTROPHILS 10/18/2022 10.1 (A)  1.8 - 8.0 K/UL Final    ABS. LYMPHOCYTES 10/18/2022 1.4  0.8 - 3.5 K/UL Final    ABS. MONOCYTES 10/18/2022 1.0  0.0 - 1.0 K/UL Final    ABS. EOSINOPHILS 10/18/2022 0.1  0.0 - 0.4 K/UL Final    ABS. BASOPHILS 10/18/2022 0.1  0.0 - 0.1 K/UL Final    ABS. IMM. GRANS. 10/18/2022 0.1 (A)  0.00 - 0.04 K/UL Final    DF 10/18/2022 AUTOMATED    Final    Sodium 10/18/2022 135 (A)  136 - 145 mmol/L Final    Potassium 10/18/2022 4.2  3.5 - 5.1 mmol/L Final    Chloride 10/18/2022 102  97 - 108 mmol/L Final    CO2 10/18/2022 25  21 - 32 mmol/L Final    Anion gap 10/18/2022 8  5 - 15 mmol/L Final    Glucose 10/18/2022 132 (A)  65 - 100 mg/dL Final    BUN 10/18/2022 11  6 - 20 MG/DL Final    Creatinine 10/18/2022 0.63  0.55 - 1.02 MG/DL Final    BUN/Creatinine ratio 10/18/2022 17  12 - 20   Final    eGFR 10/18/2022 >60  >60 ml/min/1.73m2 Final    Comment:   Pediatric calculator link: [email or web address]_[email or web address]    Effective Oct 3, 2022    These results are not intended for use in patients <25years of age. eGFR results are calculated without a race factor using  the 2021 CKD-EPI equation. Careful clinical correlation is recommended, particularly when comparing to results calculated using previous equations. The CKD-EPI equation is less accurate in patients with extremes of muscle mass, extra-renal metabolism of creatinine, excessive creatine ingestion, or following therapy that affects renal tubular secretion.       Calcium 10/18/2022 9.1 8.5 - 10.1 MG/DL Final    Protein, total 10/18/2022 7.4  6.4 - 8.2 g/dL Final    Albumin 10/18/2022 3.3 (A)  3.5 - 5.0 g/dL Final    Globulin 10/18/2022 4.1 (A)  2.0 - 4.0 g/dL Final    A-G Ratio 10/18/2022 0.8 (A)  1.1 - 2.2   Final    Bilirubin, total 10/18/2022 1.1 (A)  0.2 - 1.0 MG/DL Final    Bilirubin, direct 10/18/2022 0.6 (A)  0.0 - 0.2 MG/DL Final    Alk. phosphatase 10/18/2022 173 (A)  45 - 117 U/L Final    AST (SGOT) 10/18/2022 85 (A)  15 - 37 U/L Final    ALT (SGPT) 10/18/2022 157 (A)  12 - 78 U/L Final   Office Visit on 10/06/2022   Component Date Value Ref Range Status    Special Requests: 10/06/2022 NO SPECIAL REQUESTS    Final    Culture result: 10/06/2022 No significant growth, <10,000 CFU/mL    Final    Color 10/06/2022 DARK YELLOW    Final    Color Reference Range: Straw, Yellow or Dark Yellow    Appearance 10/06/2022 CLEAR  CLEAR   Final    Specific gravity 10/06/2022 1.020  1.003 - 1.030   Final    pH (UA) 10/06/2022 6.0  5.0 - 8.0   Final    Protein 10/06/2022 Negative  Negative mg/dL Final    Glucose 10/06/2022 Negative  Negative mg/dL Final    Ketone 10/06/2022 15 (A)  Negative mg/dL Final    Bilirubin 10/06/2022 Negative  Negative   Final    Blood 10/06/2022 Negative  Negative   Final    Urobilinogen 10/06/2022 1.0  0.2 - 1.0 EU/dL Final    Nitrites 10/06/2022 Negative  Negative   Final    Leukocyte Esterase 10/06/2022 MODERATE (A)  Negative   Final    WBC 10/06/2022 0-4  0 - 4 /hpf Final    RBC 10/06/2022 0-5  0 - 5 /hpf Final    Epithelial cells 10/06/2022 FEW  FEW /lpf Final    Epithelial cell category consists of squamous cells and /or transitional urothelial cells. Renal tubular cells, if present, are separately identified as such.     Bacteria 10/06/2022 Negative  Negative /hpf Final    Hyaline cast 10/06/2022 2-5  0 - 5 /lpf Final    Rheumatoid factor 10/06/2022 10.5  <14.0 IU/mL Final    Comment: (NOTE)  Performed At: [address] [id number]  Neil Mohs MD RZ:[id number]      CCP Antibodies IgG/IgA 10/06/2022 14  0 - 19 units Final    Comment: (NOTE)                           Negative               <20                           Weak positive      20 - 39                           Moderate positive  40 - 59                           Strong positive        >59  Performed At: [facility] [address] [place], [place] [id number]  Neil Mohs MD PA:[id number]      Hemoglobin A1c 10/06/2022 5.9 (A)  4.0 - 5.6 % Final    Comment: NEW METHOD  PLEASE NOTE NEW REFERENCE RANGE  (NOTE)  HbA1C Interpretive Ranges  <5.7              Normal  5.7 - 6.4         Consider Prediabetes  >6.5              Consider Diabetes      Est. average glucose 10/06/2022 123  mg/dL Final    C-Reactive protein 10/06/2022 6.59 (A)  0.00 - 0.60 mg/dL Final    Comment: CRP is a nonspecific acute phase reactant that shows rapid, marked increases with inflammation, infection, trauma, tissue necrosis, malignancies and autoimmune diseases. Sequential CRP levels are useful in monitoring response to antibacterial therapy. This assay is not equivalent to the hsCRP test since the presence of one or more of the foregoing disease processes obviates the risk stratification information available from hsCRP testing. Sed rate, automated 10/06/2022 13  0 - 30 mm/hr Final    Uric acid 10/06/2022 4.4  2.6 - 6.0 MG/DL Final    INR 10/06/2022 1.2 (A)  0.9 - 1.1   Final    A single therapeutic range for Vit K antagonists may not be optimal for all indications - see June, 2008 issue of Chest, American College of Chest Physicians Evidence-Based Clinical Practice Guidelines, 8th Edition.     Prothrombin time 10/06/2022 12.2 (A)  9.0 - 11.1 sec Final    WBC 10/06/2022 15.9 (A)  3.6 - 11.0 K/uL Final    RBC 10/06/2022 4.60  3.80 - 5.20 M/uL Final    HGB 10/06/2022 13.6  11.5 - 16.0 g/dL Final    HCT 10/06/2022 40.7  35.0 - 47.0 % Final    MCV 10/06/2022 88.5  80.0 - 99.0 FL Final MCH 10/06/2022 29.6  26.0 - 34.0 PG Final    MCHC 10/06/2022 33.4  30.0 - 36.5 g/dL Final    RDW 10/06/2022 13.7  11.5 - 14.5 % Final    PLATELET 84/54/4516 366  150 - 400 K/uL Final    MPV 10/06/2022 11.2  8.9 - 12.9 FL Final    NRBC 10/06/2022 0.0  0  WBC Final    ABSOLUTE NRBC 10/06/2022 0.00  0.00 - 0.01 K/uL Final    NEUTROPHILS 10/06/2022 87 (A)  32 - 75 % Final    LYMPHOCYTES 10/06/2022 6 (A)  12 - 49 % Final    MONOCYTES 10/06/2022 7  5 - 13 % Final    EOSINOPHILS 10/06/2022 0  0 - 7 % Final    BASOPHILS 10/06/2022 0  0 - 1 % Final    IMMATURE GRANULOCYTES 10/06/2022 0  0.0 - 0.5 % Final    ABS. NEUTROPHILS 10/06/2022 13.7 (A)  1.8 - 8.0 K/UL Final    ABS. LYMPHOCYTES 10/06/2022 1.0  0.8 - 3.5 K/UL Final    ABS. MONOCYTES 10/06/2022 1.2 (A)  0.0 - 1.0 K/UL Final    ABS. EOSINOPHILS 10/06/2022 0.0  0.0 - 0.4 K/UL Final    ABS. BASOPHILS 10/06/2022 0.0  0.0 - 0.1 K/UL Final    ABS. IMM. GRANS. 10/06/2022 0.1 (A)  0.00 - 0.04 K/UL Final    DF 10/06/2022 AUTOMATED    Final    Sodium 10/06/2022 134 (A)  136 - 145 mmol/L Final    Potassium 10/06/2022 4.1  3.5 - 5.1 mmol/L Final    Chloride 10/06/2022 102  97 - 108 mmol/L Final    CO2 10/06/2022 25  21 - 32 mmol/L Final    Anion gap 10/06/2022 7  5 - 15 mmol/L Final    Glucose 10/06/2022 236 (A)  65 - 100 mg/dL Final    BUN 10/06/2022 13  6 - 20 MG/DL Final    Creatinine 10/06/2022 0.75  0.55 - 1.02 MG/DL Final    BUN/Creatinine ratio 10/06/2022 17  12 - 20   Final    eGFR 10/06/2022 >60  >60 ml/min/1.73m2 Final    Comment:   Pediatric calculator link: [email or web address]_[email or web address]    Effective Oct 3, 2022    These results are not intended for use in patients <25years of age. eGFR results are calculated without a race factor using  the 2021 CKD-EPI equation. Careful clinical correlation is recommended, particularly when comparing to results calculated using previous equations.   The CKD-EPI equation is less accurate in patients with extremes of muscle mass, extra-renal metabolism of creatinine, excessive creatine ingestion, or following therapy that affects renal tubular secretion. Calcium 10/06/2022 9.2  8.5 - 10.1 MG/DL Final    Bilirubin, total 10/06/2022 1.7 (A)  0.2 - 1.0 MG/DL Final    ALT (SGPT) 10/06/2022 574 (A)  12 - 78 U/L Final    AST (SGOT) 10/06/2022 397 (A)  15 - 37 U/L Final    Alk. phosphatase 10/06/2022 210 (A)  45 - 117 U/L Final    Protein, total 10/06/2022 7.6  6.4 - 8.2 g/dL Final    Albumin 10/06/2022 3.6  3.5 - 5.0 g/dL Final    Globulin 10/06/2022 4.0  2.0 - 4.0 g/dL Final    A-G Ratio 10/06/2022 0.9 (A)  1.1 - 2.2   Final    SAMPLES BEING HELD 10/06/2022 1SST   Final    COMMENT 10/06/2022 Add-on orders for these samples will be processed based on acceptable specimen integrity and analyte stability, which may vary by analyte. Final         Radiographic Studies:  XR Results (most recent):  Results from Hospital Encounter encounter on 10/06/22    XR HAND RT MIN 3 V    Narrative  PROCEDURE: XR HAND RT MIN 3 V    COMPARISON: No comparisons    REASON FOR STUDY: Right hand pain    FINDINGS: 3 views of the right hand demonstrate no evidence of acute fracture. Severe osteoarthritis of the first [facility] joint is noted with degenerative changes  in the radiocarpal joint. Ultimately focal osteoarthritis is present. 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 MELIDA Results (most recent):  No results found for this or any previous visit. CT Results (most recent):  No results found for this or any previous visit. DEXA Results (most recent):  No results found for this or any previous visit. MRI Results (most recent):  Results from [place] encounter on 12/01/22    MRI ABD W MRCP W WO CONT    Narrative  MRI ABDOMEN AND MRCP WITH AND WITHOUT CONTRAST.  12/1/2022 12:29 PM    INDICATION: Elevated liver enzymes, primary sclerosing cholangitis. COMPARISON: Ultrasound 5/20/2022. TECHNIQUE: Multisequence and multiplanar MRI of the abdomen was performed after  the administration of 12 mL of gadoteridol (ProHance) IV contrast. Images were  obtained without contrast; and in late arterial, portal venous, and delayed  phases after the administration of contrast.    Heavily T2-weighted thick slab and thin slice images were obtained in the  oblique coronal plane through the biliary tree (MRCP). FINDINGS:  MRCP: No beaded stricturing and dilation to suggest primary sclerosing  cholangitis (PSC). The proximal common bile duct is at the upper limits of  normal in caliber (6 mm), but tapers smoothly to normal caliber at the ampulla. No pancreatic duct dilation. Incidental note is made of a moderate gallstone and  numerous tiny gallstones. Abdomen: There is minimal, focal, right renal cortical scarring (801-46). A tiny  right renal cyst requires no follow-up. The distal esophagus, stomach, duodenum,  liver, pancreas, spleen, adrenals, kidneys, and visualized portions of the small  bowel, are otherwise normal. Sigmoid diverticulosis is mild. The redundant,  tortuous, transverse colon is a normal variant. There is a small cystocele (2-6,  1-32). 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 No free fluid and  no abdominal lymphadenopathy. Impression  No PSC. Assessment/Plan:       ICD-10-CM ICD-9-CM    1. Chronic active hepatitis ([facility]  K73.2 571.49       2. Parkinson's disease ([facility]  G20 332.0       3. Tachycardia  R00.0 785.0              Tachycardia:  - Etiology not clear, potentially related to anxiety; patient notes that she typically has lower heart rate readings at home.   Consider Holter monitor to evaluate overall burden; advised patient to monitor this carefully, warned about the less likely possibility of development of tachycardia induced cardiomyopathy, notes no ongoing cardiopulmonary symptoms at this time.    Chronic Active Hepatitis:     - CTP: n/a     - Biopsy-proven? YES   - Current meds:     - Alcohol use: No   - Ascites: NO    - Treated? NO   - Edema: NO    - Treated? NO   - Esophageal Varices: NO   - Surveillance Date: n/a   - Treated? NO   - Hepatic Encephalopathy: NO   - Treated? NO   - Hepatocellular Carcinoma: NO   - Surveillance Date: MRCP 10/22   - Osteoporosis: NO   - Last Screening: ---   - Treated? NO  Immunization status:   HAV: There is no immunization history for the selected administration types on file for this patient. HBV: There is no immunization history for the selected administration types on file for this patient. Pneumococcal: There is no immunization history for the selected administration types on file for this patient. At this time, unclear etiology; continues to follow with hepatology, deferring additional changes to subspecialty care, I reviewed the results of her recent serologies, MRCP, and other studies including liver biopsy with the patient today      Parkinson's Disease:   -Following with neurology, minimally symptomatic at this time, no focal neurological symptoms currently, no recent falls, anticipate starting Sinemet in a couple months    I have reviewed the patient's medical history in detail and updated the computerized patient record. We had a prolonged discussion about these complex clinical issues and went over the various important aspects to consider. All questions were answered. Advised the patient to call back or return to office if symptoms do not improve, change in nature, or persist.     The patient was given an after visit summary or informed of [employer] which includes patient instructions, diagnoses, current medications, & vitals. she expressed understanding with the diagnosis and plan. Rosario Adames MD    Please note that this dictation was completed with [employer], the [employer] voice recognition software.   Quite often unanticipated grammatical, syntax, homophones, and other interpretive errors are inadvertently transcribed by the computer software. Please disregard these errors. Please excuse any errors that have escaped final proofreading.

## 2022-12-01 ENCOUNTER — HOSPITAL ENCOUNTER (OUTPATIENT)
Dept: MRI IMAGING | Age: 67
End: 2022-12-01
Attending: NURSE PRACTITIONER
Payer: MEDICARE

## 2022-12-01 VITALS — WEIGHT: 135 LBS | BODY MASS INDEX: 23.91 KG/M2

## 2022-12-01 DIAGNOSIS — R74.8 ELEVATED LIVER ENZYMES: ICD-10-CM

## 2022-12-01 PROCEDURE — A9576 INJ PROHANCE MULTIPACK: HCPCS

## 2022-12-01 PROCEDURE — 74011000250 HC RX REV CODE- 250: Performed by: NURSE PRACTITIONER

## 2022-12-01 PROCEDURE — 74183 MRI ABD W/O CNTR FLWD CNTR: CPT

## 2022-12-01 PROCEDURE — 74011000258 HC RX REV CODE- 258: Performed by: NURSE PRACTITIONER

## 2022-12-01 PROCEDURE — 74011250636 HC RX REV CODE- 250/636

## 2022-12-01 RX ORDER — SODIUM CHLORIDE 0.9 % (FLUSH) 0.9 %
10 SYRINGE (ML) INJECTION
Status: COMPLETED | OUTPATIENT
Start: 2022-12-01 | End: 2022-12-01

## 2022-12-01 RX ADMIN — SODIUM CHLORIDE 100 ML: 900 INJECTION, SOLUTION INTRAVENOUS at 12:20

## 2022-12-01 RX ADMIN — SODIUM CHLORIDE, PRESERVATIVE FREE 10 ML: 5 INJECTION INTRAVENOUS at 12:20

## 2022-12-01 RX ADMIN — GADOTERIDOL 12 ML: 279.3 INJECTION, SOLUTION INTRAVENOUS at 12:20

## 2022-12-06 ENCOUNTER — TELEPHONE (OUTPATIENT)
Dept: HEMATOLOGY | Age: 67
End: 2022-12-06

## 2022-12-06 ENCOUNTER — OFFICE VISIT (OUTPATIENT)
Dept: INTERNAL MEDICINE CLINIC | Age: 67
End: 2022-12-06
Payer: MEDICARE

## 2022-12-06 VITALS
RESPIRATION RATE: 18 BRPM | BODY MASS INDEX: 24.22 KG/M2 | HEIGHT: 63 IN | OXYGEN SATURATION: 97 % | SYSTOLIC BLOOD PRESSURE: 132 MMHG | WEIGHT: 136.7 LBS | HEART RATE: 115 BPM | DIASTOLIC BLOOD PRESSURE: 70 MMHG

## 2022-12-06 DIAGNOSIS — G20 PARKINSON'S DISEASE (HCC): ICD-10-CM

## 2022-12-06 DIAGNOSIS — R00.0 TACHYCARDIA: ICD-10-CM

## 2022-12-06 DIAGNOSIS — K73.2 CHRONIC ACTIVE HEPATITIS (HCC): Primary | ICD-10-CM

## 2022-12-06 PROCEDURE — 99214 OFFICE O/P EST MOD 30 MIN: CPT | Performed by: INTERNAL MEDICINE

## 2022-12-06 PROCEDURE — 1123F ACP DISCUSS/DSCN MKR DOCD: CPT | Performed by: INTERNAL MEDICINE

## 2022-12-06 RX ORDER — MULTIVITAMIN
1 TABLET ORAL DAILY
COMMUNITY

## 2022-12-06 NOTE — PROGRESS NOTES
Chief Complaint   Patient presents with    Follow-up       1. \"Have you been to the ER, urgent care clinic since your last visit? Hospitalized since your last visit? \" No    2. \"Have you seen or consulted any other health care providers outside of the [address] [place] [address] since your last visit? \" No     3. For patients aged 39-70: Has the patient had a colonoscopy / FIT/ Cologuard? No      If the patient is female:    4. For patients aged 41-77: Has the patient had a mammogram within the past 2 years? No      5. For patients aged 21-65: Has the patient had a pap smear?  No

## 2022-12-06 NOTE — TELEPHONE ENCOUNTER
----- Message from Reji Munguia MD sent at 12/6/2022  5:26 AM EST -----  Regarding: TEll her MRI looks ok. No liver mass. Keep FU appt        [email or web address] spoke w/patient above information given from Nohemi. MRI looks okay. No liver mass and keep follow up appt for 12/9/22. Patient verbalize understanding. (KF)

## 2022-12-09 ENCOUNTER — OFFICE VISIT (OUTPATIENT)
Dept: HEMATOLOGY | Age: 67
End: 2022-12-09
Payer: MEDICARE

## 2022-12-09 VITALS
WEIGHT: 135.4 LBS | OXYGEN SATURATION: 96 % | DIASTOLIC BLOOD PRESSURE: 85 MMHG | HEIGHT: 63 IN | BODY MASS INDEX: 23.99 KG/M2 | SYSTOLIC BLOOD PRESSURE: 136 MMHG | HEART RATE: 98 BPM | TEMPERATURE: 97.3 F

## 2022-12-09 DIAGNOSIS — K75.4 AUTOIMMUNE HEPATITIS (HCC): Primary | ICD-10-CM

## 2022-12-09 LAB
ALBUMIN SERPL-MCNC: 3.8 G/DL (ref 3.5–5)
ALBUMIN/GLOB SERPL: 1 {RATIO} (ref 1.1–2.2)
ALP SERPL-CCNC: 208 U/L (ref 45–117)
ALT SERPL-CCNC: 356 U/L (ref 12–78)
ANION GAP SERPL CALC-SCNC: 5 MMOL/L (ref 5–15)
AST SERPL-CCNC: 288 U/L (ref 15–37)
BASOPHILS # BLD: 0.1 K/UL (ref 0–0.1)
BASOPHILS NFR BLD: 1 % (ref 0–1)
BILIRUB DIRECT SERPL-MCNC: 0.8 MG/DL (ref 0–0.2)
BILIRUB SERPL-MCNC: 1.4 MG/DL (ref 0.2–1)
BUN SERPL-MCNC: 13 MG/DL (ref 6–20)
BUN/CREAT SERPL: 19 (ref 12–20)
CALCIUM SERPL-MCNC: 9.4 MG/DL (ref 8.5–10.1)
CHLORIDE SERPL-SCNC: 105 MMOL/L (ref 97–108)
CO2 SERPL-SCNC: 30 MMOL/L (ref 21–32)
CREAT SERPL-MCNC: 0.67 MG/DL (ref 0.55–1.02)
DIFFERENTIAL METHOD BLD: NORMAL
EOSINOPHIL # BLD: 0.1 K/UL (ref 0–0.4)
EOSINOPHIL NFR BLD: 2 % (ref 0–7)
ERYTHROCYTE [DISTWIDTH] IN BLOOD BY AUTOMATED COUNT: 13.3 % (ref 11.5–14.5)
GLOBULIN SER CALC-MCNC: 4 G/DL (ref 2–4)
GLUCOSE SERPL-MCNC: 120 MG/DL (ref 65–100)
HCT VFR BLD AUTO: 42.2 % (ref 35–47)
HGB BLD-MCNC: 14.2 G/DL (ref 11.5–16)
IMM GRANULOCYTES # BLD AUTO: 0 K/UL (ref 0–0.04)
IMM GRANULOCYTES NFR BLD AUTO: 0 % (ref 0–0.5)
LYMPHOCYTES # BLD: 1.2 K/UL (ref 0.8–3.5)
LYMPHOCYTES NFR BLD: 22 % (ref 12–49)
MCH RBC QN AUTO: 30.5 PG (ref 26–34)
MCHC RBC AUTO-ENTMCNC: 33.6 G/DL (ref 30–36.5)
MCV RBC AUTO: 90.6 FL (ref 80–99)
MONOCYTES # BLD: 0.5 K/UL (ref 0–1)
MONOCYTES NFR BLD: 8 % (ref 5–13)
NEUTS SEG # BLD: 3.7 K/UL (ref 1.8–8)
NEUTS SEG NFR BLD: 67 % (ref 32–75)
NRBC # BLD: 0 K/UL (ref 0–0.01)
NRBC BLD-RTO: 0 PER 100 WBC
PLATELET # BLD AUTO: 171 K/UL (ref 150–400)
PMV BLD AUTO: 10.8 FL (ref 8.9–12.9)
POTASSIUM SERPL-SCNC: 4.1 MMOL/L (ref 3.5–5.1)
PROT SERPL-MCNC: 7.8 G/DL (ref 6.4–8.2)
RBC # BLD AUTO: 4.66 M/UL (ref 3.8–5.2)
SODIUM SERPL-SCNC: 140 MMOL/L (ref 136–145)
WBC # BLD AUTO: 5.5 K/UL (ref 3.6–11)

## 2022-12-09 PROCEDURE — G8420 CALC BMI NORM PARAMETERS: HCPCS | Performed by: NURSE PRACTITIONER

## 2022-12-09 PROCEDURE — 99214 OFFICE O/P EST MOD 30 MIN: CPT | Performed by: NURSE PRACTITIONER

## 2022-12-09 PROCEDURE — G9899 SCRN MAM PERF RSLTS DOC: HCPCS | Performed by: NURSE PRACTITIONER

## 2022-12-09 PROCEDURE — 91200 LIVER ELASTOGRAPHY: CPT | Performed by: NURSE PRACTITIONER

## 2022-12-09 PROCEDURE — 1101F PT FALLS ASSESS-DOCD LE1/YR: CPT | Performed by: NURSE PRACTITIONER

## 2022-12-09 PROCEDURE — 1090F PRES/ABSN URINE INCON ASSESS: CPT | Performed by: NURSE PRACTITIONER

## 2022-12-09 PROCEDURE — G8510 SCR DEP NEG, NO PLAN REQD: HCPCS | Performed by: NURSE PRACTITIONER

## 2022-12-09 PROCEDURE — 3017F COLORECTAL CA SCREEN DOC REV: CPT | Performed by: NURSE PRACTITIONER

## 2022-12-09 PROCEDURE — G8399 PT W/DXA RESULTS DOCUMENT: HCPCS | Performed by: NURSE PRACTITIONER

## 2022-12-09 PROCEDURE — G8536 NO DOC ELDER MAL SCRN: HCPCS | Performed by: NURSE PRACTITIONER

## 2022-12-09 PROCEDURE — 1123F ACP DISCUSS/DSCN MKR DOCD: CPT | Performed by: NURSE PRACTITIONER

## 2022-12-09 PROCEDURE — G8427 DOCREV CUR MEDS BY ELIG CLIN: HCPCS | Performed by: NURSE PRACTITIONER

## 2022-12-09 RX ORDER — PREDNISONE 20 MG/1
TABLET ORAL
Qty: 30 TABLET | Refills: 3 | Status: SHIPPED | OUTPATIENT
Start: 2022-12-09 | End: 2022-12-09 | Stop reason: CLARIF

## 2022-12-09 RX ORDER — PREDNISONE 20 MG/1
20 TABLET ORAL
Qty: 30 TABLET | Refills: 3 | Status: SHIPPED | OUTPATIENT
Start: 2022-12-09

## 2022-12-09 NOTE — Clinical Note
12/26/2022    Patient: Anant Delaney   YOB: 1955   Date of Visit: 12/9/2022     MD Sherry Mayen Marmesha Almeida [address]  [address] [id number]  [address] [facility] [address]    Dear Milind Chandra MD,      Thank you for referring Ms. Stefanie De La Garza to [facility] for evaluation. My notes for this consultation are attached. If you have questions, please do not hesitate to call me. I look forward to following your patient along with you.       Sincerely,    Celeste Christie MD

## 2022-12-09 NOTE — PROGRESS NOTES
[address], MD, [place], Ariel Scotty Pranav, [place]      TYLER Teran, [facility] Center[facility]BC   Marjorie Humphries, [facility]-   Nataly Reyes, FNP-C   Humble Vuong, FNP-C    María Chen, [facility] Center[facility]       Martimin Isaacs [address]    at [address], [address] [place] [address]    [address] [facility] [address].    [phone]    FAX: [address] [place] [address]    at [address], [address]    [phone]    FAX: [phone]       Patient Care Team:  Elvi Thomas MD as PCP - General (Internal Medicine Physician)  Elvi Thomas MD as PCP - [facility]  Scarlett Reynaga MD (Gastroenterology)      Problem List  Date Reviewed: 11/29/2022            Codes Class Noted    Osteopenia ICD-10-CM: M85.80  ICD-9-CM: 733.90  9/20/2017        Autoimmune hepatitis ([facility] ICD-10-CM: K75.4  ICD-9-CM: 571.42  12/24/2022        Vitamin D deficiency ICD-10-CM: E55.9  ICD-9-CM: 268.9  Unknown        Parkinson's disease ([facility] ICD-10-CM: G20  ICD-9-CM: 332.0  2019    Overview Signed 5/10/2022 12:04 PM by Elvi Thomas MD     L handed tremor; no Rx as of 5/22             Eczema ICD-10-CM: L30.9  ICD-9-CM: 692.9  2019    Overview Signed 5/10/2022 12:05 PM by Elvi Thomas MD     Prev numerous dermatology referrals; currently under care with homeopathy                [facility] for [facility] of autoimmune hepatitis. The active problem list, all pertinent past medical history, medications, liver histology, radiologic findings and laboratory findings related to the liver disorder were reviewed and discussed with the patient. The patient is a 75 y.o.   female who was found to have liver transaminases and alkaline phosphatase in 5/2022. The liver enzymes came down with time and normalized by 9/2022, but then increased again in 10/2022. Liver enzymes improved mid- 10/2022 while patient was on a brief course of steroids for eczema but again elevated when steroids were completed. Serologic evaluation for markers of chronic liver disease was positive for ASMA. The most recent imaging of the liver was Ultrasound performed in 5/2022. Results suggest that the liver is normal.      A liver biopsy in 10/2022 demonstrates mild portal inflammation, and portal fibrosis that is greater than would be expected given the degree of inflammation. Assessment of liver fibrosis with Fibroscan was performed in the office today. The result was 13.2 kPa which correlates with stage 3 fibrosis. MRCP performed 12/2022 was negative for bile duct disease. The patient has the following symptoms which could be attributed to the liver disorder:    swelling of the hands,   Severe eczema    The patient is not experiencing the following symptoms which are commonly seen in this liver disorder:   pain in the right side over the liver,     The patient completes all daily activities without any functional limitations. We discussed the importance of starting recommended medication for Parkinson's Disease; this will not negatively impact her liver and will improve quality of life. ASSESSMENT AND PLAN:  Autoimmune Hepatitis   The diagnosis was based upon laboratory studies, serology that was positive for ASMA and liver biopsy. A liver biopsy performed in 10/2022 demonstrates mild portal inflammation, mild PMN, no lobular inflammation, no steatosis, and stage 1 fibrosis portal that is greater than the degree of inflammation. Fibroscan in 12/2022 was 13.2 kPa with  consistent with stage 3 fibrosis and no hepatic steatosis. Liver transaminases are elevated. ALP is elevated.   Liver function is normal.  The platelet count is normal. Will start prednisone 20 mg daily with the plan to later transition to Cellcept once liver enzymes and alkaline phosphatase have normalized. Discussed side effects of prednisone including elevation of blood sugar, aseptic necrosis of hips, osteoporosis, blurring of vision and increased appetite leading to weight gain. Discussed side effects of Cellcept including diarrhea, lowering of WBC and slight increase risk for infection. Elevation in Ferritin  There is an elevation in ferritin with N\normal iron saturation. The patient does not have hemochromatosis and is unlikely to be a carrier for an HFE gene. Suspect the elevation in ferritin reflects hepatic inflammation. Screening for Hepatocellular Carcinoma  HCC screening is not necessary if the patient has no evidence of cirrhosis. Treatment of other medical problems in patients with chronic liver disease  There are no contraindications for the patient to take most medications that are necessary for treatment of other medical issues. Counseling for alcohol in patients with chronic liver disease  The patient was counseled regarding alcohol consumption and the effect of alcohol on chronic liver disease. The patient does not consume any significant amount of alcohol. Vaccinations   Vaccination for viral hepatitis B is not needed. The patient has serologic evidence of prior exposure or vaccination with immunity. Routine vaccinations against other bacterial and viral agents can be performed as indicated. Annual flu vaccination should be administered if indicated. ALLERGIES  No Known Allergies    MEDICATIONS  Current Outpatient Medications   Medication Sig    predniSONE (DELTASONE) 20 mg tablet Take 1 Tablet by mouth daily (with breakfast). Indications: liver inflammation resulting from an abnormal immune response    calcium-cholecalciferol, D3, (CALTRATE 600+D) tablet Take 1 Tablet by mouth daily.     ascorbic acid, vitamin C, (VITAMIN C) 500 mg tablet Take 1,000 mg by mouth. tacrolimus (PROTOPIC) 0.1 % ointment     ELDERBERRY FRUIT PO      No current facility-administered medications for this visit. SYSTEM REVIEW NOT RELATED TO LIVER DISEASE OR REVIEWED ABOVE:  Constitution systems: Negative for fever, chills, weight gain, weight loss. Eyes: Negative for visual changes. ENT: Negative for sore throat, painful swallowing. Respiratory: Negative for cough, hemoptysis, SOB. Cardiology: Negative for chest pain, palpitations. GI:  Negative for constipation or diarrhea. : Negative for urinary frequency, dysuria, hematuria, nocturia. Skin: (+) Eczema related rash on bilateral hands and forearms. Hematology: Negative for easy bruising, blood clots. Musculo-skelatal: Negative for back pain, muscle pain, weakness. Neurologic: (+) Bilateral hand tremor. Negative for headaches, dizziness, vertigo, memory problems not related to HE. Psychology: Negative for anxiety, depression. FAMILY HISTORY:  The father  of heart disease. The mother had the following chronic disease(s): DM, thyroid disease and  of old age. There is no family history of liver disease. SOCIAL HISTORY:  The patient is . The patient has 2 children, and 3 grandchildren. The patient has never used tobacco products. The patient has never consumed significant amounts of alcohol. The patient used to work as .      PHYSICAL EXAMINATION:  Visit Vitals  /85 (BP 1 Location: Right arm, BP Patient Position: Sitting, BP Cuff Size: Adult)   Pulse 98   Temp 97.3 °F (36.3 °C) (Temporal)   Ht 5' 3\" (1.6 m)   Wt 135 lb 6.4 oz (61.4 kg)   SpO2 96%   BMI 23.99 kg/m²     General: No acute distress. Eyes: Sclera anicteric. ENT: No oral lesions. Thyroid normal.  Nodes: No adenopathy. Skin: No spider angiomata. No jaundice. No palmar erythema. Respiratory: Lungs clear to auscultation.    Cardiovascular: Regular heart rate.  No murmurs. No JVD. Abdomen: Soft non-tender. Liver size normal to percussion/palpation. Spleen not palpable. No obvious ascites. Extremities: No edema. No muscle wasting. No gross arthritic changes. Neurologic: Alert and oriented. Cranial nerves grossly intact. No asterixis. LABORATORY STUDIES:  Liver [place] [address] & Units 12/9/2022 10/18/2022   WBC 3.6 - 11.0 K/uL 5.5 12.7 (H)   ANC 1.8 - 8.0 K/UL 3.7 10.1 (H)   HGB 11.5 - 16.0 g/dL 14.2 13.4    - 400 K/uL 171 251   INR 0.9 - 1.1    1.0   AST 15 - 37 U/L 288 (H) 85 (H)   ALT 12 - 78 U/L 356 (H) 157 (H)   Alk Phos 45 - 117 U/L 208 (H) 173 (H)   Bili, Total 0.2 - 1.0 MG/DL 1.4 (H) 1.1 (H)   Bili, Direct 0.0 - 0.2 MG/DL 0.8 (H) 0.6 (H)   Albumin 3.5 - 5.0 g/dL 3.8 3.3 (L)   BUN 6 - 20 MG/DL 13 11   Creat 0.55 - 1.02 MG/DL 0.67 0.63   Na 136 - 145 mmol/L 140 135 (L)   K 3.5 - 5.1 mmol/L 4.1 4.2   Cl 97 - 108 mmol/L 105 102   CO2 21 - 32 mmol/L 30 25   Glucose 65 - 100 mg/dL 120 (H) 132 (H)       SEROLOGIES:  Serologies Latest Ref Rng & Units 5/10/2022   Hep C Ab NONREACTIVE   NONREACTIVE     Serologies Latest Ref Rng & Units 10/18/2022 5/18/2022   Hep B Surface Ag Interp Negative    Negative   Hep B Core Ab, Total Negative    Negative   Hep B Surface Ab mIU/mL  11.40   Hep B Surface Ab Interp NONREACTIVE    REACTIVE (A)   Hep C Ab NONREACTIVE       Ferritin 26 - 388 NG/ (H)    Iron % Saturation 20 - 50 % 10 (L)    ARLEY, IFA  Negative    C-ANCA Neg:<1:20 titer <1:20    P-ANCA Neg:<1:20 titer <1:20    ANCA Neg:<1:20 titer <1:20    ASMCA 0 - 19 Units  22 (H)   M2 Ab 0.0 - 20.0 Units  <20.0   Alpha-1 antitrypsin level 101 - 187 mg/dL 272 (H)      LIVER HISTOLOGY:  10/2022. Slides reviewed by MLS.mild portal inflammation, mild PMN, no lobular inflammation, no steatosis, and stage 1 fibrosis portal that is greater than the degree of inflammation. 12/2022. FibroScan performed at [facility] [place].  Monica was 13.2. IQR/med 8%. . The results suggested a fibrosis level of F3. The CAP score suggests there is no significant hepatic steatosis. ENDOSCOPIC PROCEDURES:  Not available or performed    RADIOLOGY:  5/2022. Ultrasound of liver. Normal appearing liver. No liver mass lesions. 12/2022. MRI Abd/MRCP. Negative for PSC. Moderate gallstone, numerous tiny gallstones. OTHER TESTING:  Not available or performed    FOLLOW-UP:  All of the issues listed above in the Assessment and Plan were discussed with the patient. All questions were answered. The patient expressed a clear understanding of the above. [facility] [place] [address] in 4 weeks for routine monitoring and to assess response to prednisone.      Cameron Fuentes, [facility]  Marti Deputado [address]  [address] [facility], [address] [place], [place] [address]. [address] Qi Morales MD  [facility] Liver [place] of [address] [address] Jad Hewitt [address] [place] [address] 22. [phone]  FAX:  [phone] [place]

## 2022-12-09 NOTE — PROGRESS NOTES
Identified pt with two pt identifiers(name and ). Reviewed record in preparation for visit and have obtained necessary documentation. Chief Complaint   Patient presents with    Elevated Liver Enzymes     FS 4wks follow up with Marylee Naas:    22 1010   BP: 136/85   Pulse: 98   Temp: 97.3 °F (36.3 °C)   TempSrc: Temporal   SpO2: 96%   Weight: 135 lb 6.4 oz (61.4 kg)   Height: 5' 3\" (1.6 m)   PainSc:   0 - No pain       Health Maintenance Review: Patient reminded of \"due or due soon\" health maintenance. I have asked the patient to contact his/her primary care provider (PCP) for follow-up on his/her health maintenance. Coordination of Care Questionnaire:  :   1) Have you been to an emergency room, urgent care, or hospitalized since your last visit? If yes, where when, and reason for visit? no       2. Have seen or consulted any other health care provider since your last visit? If yes, where when, and reason for visit? NO      Patient is accompanied by daughter I have received verbal consent from [facility] to discuss any/all medical information while they are present in the room.

## 2022-12-09 NOTE — PATIENT INSTRUCTIONS
AUTOIMMUNE HEPATITIS OVERVIEW -- The liver is one of the largest and most important organs in the body. It functions to cleanse toxins from the blood, break down medications, and aids in digestion and blood clotting. Hepatitis is a general term that means inflammation of the liver. There are many forms and causes of hepatitis (such as viruses and certain drugs), including autoimmune hepatitis (AIH). In AIH, the body's immune system attacks the cells of the liver, which causes the liver to become inflamed. AIH may present at any time, at any age, and in people of any race or sex, although it is four times more common in females than males. Topics that discuss other types of hepatitis are available separately. (See \"Patient education: Hepatitis A (Beyond the Basics)\" and \"Patient education: Hepatitis B (Beyond the Basics)\" and \"Patient education: Hepatitis C (Beyond the Basics)\". )  AUTOIMMUNE HEPATITIS CAUSES -- It is not clear why AIH develops. Researchers suspect that some people inherit a genetic disposition that could make them more likely to develop it. Sometimes drugs or infections trigger the development of the disease. TYPES OF AUTOIMMUNE HEPATITIS -- There are two major forms of AIH: type 1 and type 2. ?Type 1 AIH can affect people of any age or sex  ? Type 2 AIH primarily affects girls and young females and is less common  There are also rare forms of AIH (called variants) that have features of both AIH and other liver diseases (primary sclerosing cholangitis or primary biliary cholangitis). This occurs in approximately 15 percent of people with AIH. These variant syndromes can occur along with AIH at time of diagnosis or may develop later. AUTOIMMUNE HEPATITIS SYMPTOMS -- Many people with autoimmune hepatitis (AIH) have no symptoms. The disorder is often first detected by abnormal laboratory tests performed for an unrelated reason (such as for a life insurance examination).   When symptoms are present, the most common symptom is fatigue. Some people also have additional symptoms, including jaundice (yellowing of the skin or eyes), itching, skin rashes, joint pain, abdominal discomfort, nausea, vomiting, loss of appetite, dark urine, and pale or gray-colored stools. In its most advanced form, AIH can progress to cirrhosis (severe scarring of the liver). (See \"Patient education: Cirrhosis (Beyond the Basics)\". )   Rarely, a person may not know they have AIH until they develop severe liver damage and acute liver failure, which may require urgent liver transplantation. AUTOIMMUNE HEPATITIS DIAGNOSIS -- AIH is diagnosed with a combination of blood tests (to exclude viruses and other causes of liver disease) and a liver biopsy. During a liver biopsy, a small sample of liver tissue is removed for examination under a microscope. The biopsy can help to confirm the diagnosis and determine its severity while excluding other causes of liver disease. (See \"Patient education: Liver biopsy (Beyond the Basics)\". )  AUTOIMMUNE HEPATITIS TREATMENT -- Not everyone with autoimmune hepatitis (AIH) needs treatment immediately. The decision to treat is based on the severity of symptoms, the severity of the disease (based upon results of blood tests and the liver biopsy), and the potential side effects of treatment. The guidelines for treatment can be found online at the [facility] for the Study of Liver Diseases ([email or web address] org). Medications -- AIH is usually treated first with a glucocorticoid (steroid medication) such as prednisone; budesonide may be used in people without severe liver scarring. ?Glucocorticoids - Glucocorticoids such as prednisone control the inflammation in the liver, thereby preventing further scarring.  The main drawback of prednisone is side effects, which can include weight gain, acne, bone loss, elevated blood glucose levels (potentially leading to diabetes), an increased risk of infections, cataracts, high blood pressure, and mood and sleep disturbance, among others. People who require long-term prednisone are monitored carefully for these side effects. To minimize the risks of side effects, the lowest possible dose of prednisone is used. Budesonide is a glucocorticoid that has more liver-specific effects and is associated with fewer side effects. It is an alternative to prednisone in selected situations. It should only be used in people who have no evidence of cirrhosis. ? Azathioprine or 6-mercaptopurine - A second medication, such as azathioprine (brand names: Antonio Taina) or 6-mercaptopurine (brand names: Dian Monserrat) and, less commonly, methotrexate or mycophenolate mofetil, may be recommended in addition to prednisone. The benefit of adding a second medication is that it may be possible to reduce or eliminate prednisone, helping to minimize the potential side effects of prednisone. Azathioprine and 6-mercaptopurine can also cause side effects, including allergic reactions, a low white blood cell count, inflammation of the pancreas, nausea, and abnormal liver blood tests (which can sometimes cause confusion as to whether the abnormal results are from the AIH or the drugs used to treat it). There may be a small increased risk of certain types of cancer (such as lymphoma). Blood tests to monitor for these conditions are performed regularly while taking these medications. Mycophenolate has several potential risks, including an increased risk of developing infections or cancers (www.nlm.nih.gov/medlineplus/druginfo/meds/s260102.html). Mycophenolate can cause birth defects and should not be taken during pregnancy. Anyone who uses mycophenolate and could potentially get pregnant must use two effective methods of birth control (eg, condoms and the birth control pill).   For people with AIH who do not respond to initial treatment, other medications that may be used include an anti-rejection medication such as tacrolimus or cyclosporine. Rarely, infliximab or rituximab may be used. Liver transplantation may be needed for people with very severe disease or in people with cirrhosis who have complications. For people taking a glucocorticoid such as prednisone, bone mineral density is monitored to screen for osteoporosis. Duration of treatment -- As a general rule, treatment is continued until the disease is in remission, the treatment fails, or the person develops severe side effects from treatment. Remission is defined as a lack of symptoms, normal or near normal levels of liver blood tests, and improvement in the appearance of liver tissue (based upon a biopsy). The initial period of remission generally occurs 12 or more months after treatment begins. The majority of people achieve remission by 18 months to three years of treatment. Approximately 50 percent of people remain in remission or have only mild disease activity for months to years after treatment is stopped. However, most people (75 to 80 percent) must eventually restart treatment because the disease becomes active again (relapse). Relapse typically occurs within the first 6 to 12 months after treatment is stopped. Relapse is more likely in those who have cirrhosis on the initial liver biopsy. If medications are not used -- Close follow-up is recommended for people who are not initially treated with medications. Follow-up generally includes a physical examination and blood tests every few months. Self care -- Taking medication and seeing a health care provider on a regular basis can help to ensure that the liver remains as healthy as possible. Diet -- No specific diet has been shown to improve the outcome in people with AIH. The best advice is to eat a normal, healthy and balanced diet and to avoid becoming obese; obesity can increase the risk of fatty liver disease and may complicate AIH.   Alcohol -- Alcohol should be avoided since it can cause fatty liver and other liver damage. All types of alcoholic beverages can be harmful to the liver, including beer, wine, and liquor. People with liver disease may worsen with even small amounts of alcohol. Exercise -- Exercise is good for overall health and is encouraged, but it has no specific benefit for people with AIH. Prescription and nonprescription drugs -- Many drugs are broken down by the liver. Thus, it is always best to check with a health care provider or pharmacist before starting a new prescription. Unless the liver is already scarred, most drugs are safe. Some people with active liver disease will be advised to take a smaller dose of medication. An important exception is acetaminophen (sample brand name: Tylenol), commonly used for headaches, other aches and pains, and fever. In people with any type of liver disease, the maximum recommended dose of acetaminophen is no more than 2000 mg (in divided doses) per 24 hours. Thus, it is reasonable to take 500 mg every four to six hours, although this should not be repeated more than four times in one day. Herbal medications -- There are a number of claims, particularly on the internet, that herbal medications can improve liver health. However, no single or combination of herbs has been proven to improve outcomes in people with AIH. Some herbs can cause serious liver damage, and some have been implicated in triggering AIH. For this reason, we do not currently recommend any herbal treatment for liver disease. Support -- Do not underestimate the value of sharing your concerns with other people with AIH. Ask your health care provider about support groups or ways to connect with other people who may be willing to discuss their experiences with AIH. PREGNANCY AND AUTOIMMUNE HEPATITIS -- People who are treated for autoimmune hepatitis (AIH) can have successful pregnancies.  Treatment usually includes glucocorticoids and/or azathioprine, both of which are probably safe during pregnancy. (See \"Patient education: Disease-modifying antirheumatic drugs (DMARDs) in rheumatoid arthritis (Beyond the Basics)\". )   Stopping treatment during pregnancy can lead to relapse of the disease and is not usually recommended. People with AIH are also more likely to develop diabetes or have high blood pressure during pregnancy. Aspirin use may prevent blood-pressure-related complications and should be discussed with a health care provider before week 10 of pregnancy. Babies of mothers with AIH may have an increased risk of prematurity, low birth weight, and other fetal problems. Pregnant people need to be monitored carefully during pregnancy and several months after delivery because of the risk of flares in disease activity. LONG-TERM OUTCOME -- Untreated AIH can cause scarring of the liver and ultimately lead to cirrhosis and liver failure. Fortunately, proper treatment can prevent scarring and cirrhosis in most people. Treatment can be beneficial, even if advanced scarring or cirrhosis has already developed, arresting progression of scarring and, sometimes, reversing the scarring. Approximately 10 to 40 percent of people with AIH go into remission and no longer need medications for their condition; however, only approximately 20 to 30 percent of these people stay in remission. Thus, most people need either continuous therapy or additional rounds of medication to treat ongoing disease. WHERE TO GET MORE INFORMATION -- Your health care provider is the best source of information for questions and concerns related to your medical problem. This article will be updated as needed on our web site ([email or web address]). Related topics for patients, as well as selected articles written for health care professionals, are also available. Some of the most relevant are listed below. Patient level information -- [employer] offers two types of patient education materials.   The Basics -- The Basics patient education pieces answer the four or five key questions a patient might have about a given condition. These articles are best for patients who want a general overview and who prefer short, easy-to-read materials. This topic currently has no corresponding Basic content. Beyond the Basics -- Beyond the Basics patient education pieces are longer, more sophisticated, and more detailed. These articles are best for patients who want in-depth information and are comfortable with some medical jargon. Patient level information -- Professional level articles are designed to keep doctors and other health professionals up-to-date on the latest medical findings. These articles are thorough, long, and complex, and they contain multiple references to the research on which they are based. Professional level articles are best for people who are comfortable with a lot of medical terminology and who want to read the same materials their doctors are reading. Patient education: Hepatitis A (Beyond the Basics)  Patient education: Hepatitis B (Beyond the Basics)  Patient education: Hepatitis C (Beyond the Basics)  Patient education: Cirrhosis (Beyond the Basics)  Patient education: Liver biopsy (Beyond the Basics)  Patient education: Disease-modifying antirheumatic drugs (DMARDs) in rheumatoid arthritis (Beyond the Basics)   Professional level information:  Overview of autoimmune hepatitis  Autoimmune hepatitis variants: Definitions and treatment  Autoimmune hepatitis: Pathogenesis  Autoimmune hepatitis: Treatment    The following organizations also provide reliable health information. ? American Association for the Study of Liver Diseases       (www.aasld. org)  ? [address] [place] [address]       (www.liverfoundation. org)  ? Hepatitis Foundation International       (www.hepfi. org)  ? [employer] Data of Diabetes and Digestive and Kidney Diseases       (www.niddk.nih.gov)  ? [employer] of Health: Clinical Trials       (www.clinicaltrials.gov)  ? [employer] of [employer]       (www.nlm.nih.gov/medlineplus/healthtopics. html)

## 2022-12-22 NOTE — PROGRESS NOTES
[facility] letter sent to patient. Will recheck labs 1/13 to monitor improvement with prednisone.

## 2022-12-24 PROBLEM — R74.8 ELEVATED LIVER ENZYMES: Status: RESOLVED | Noted: 2022-10-18 | Resolved: 2022-12-24

## 2022-12-24 PROBLEM — K75.4 AUTOIMMUNE HEPATITIS (HCC): Status: ACTIVE | Noted: 2022-12-24

## 2022-12-29 ENCOUNTER — TELEPHONE (OUTPATIENT)
Dept: HEMATOLOGY | Age: 67
End: 2022-12-29

## 2022-12-29 NOTE — TELEPHONE ENCOUNTER
Patient has concerns about medications due to her parkinsons. Her pharmacist doesn't recommend her taking sinemet, but she wants verification from Doctor.

## 2022-12-29 NOTE — TELEPHONE ENCOUNTER
Spoke with Anaya Foster. Her pharmacist advised against sinemet in combination with MMF d/t risk for  CNS depression. Discussed the trajectory of treatment, that she will remain on prednisone while liver enzymes normalize and that she should start Sinemet this weekend as planned as she is experiencing worsening hand tremor. Plan made to further discuss risks/benefits as well as alternative medications at next visit.

## 2023-01-13 ENCOUNTER — OFFICE VISIT (OUTPATIENT)
Dept: HEMATOLOGY | Age: 68
End: 2023-01-13
Payer: MEDICARE

## 2023-01-13 VITALS
TEMPERATURE: 97.2 F | WEIGHT: 130 LBS | OXYGEN SATURATION: 96 % | RESPIRATION RATE: 17 BRPM | SYSTOLIC BLOOD PRESSURE: 130 MMHG | HEIGHT: 63 IN | HEART RATE: 111 BPM | BODY MASS INDEX: 23.04 KG/M2 | DIASTOLIC BLOOD PRESSURE: 70 MMHG

## 2023-01-13 DIAGNOSIS — K75.4 AUTOIMMUNE HEPATITIS (HCC): Primary | ICD-10-CM

## 2023-01-13 RX ORDER — CARBIDOPA AND LEVODOPA 25; 100 MG/1; MG/1
1 TABLET ORAL 3 TIMES DAILY
COMMUNITY

## 2023-01-13 NOTE — PROGRESS NOTES
Identified pt with two pt identifiers(name and ). Reviewed record in preparation for visit and have obtained necessary documentation. Chief Complaint   Patient presents with    Follow-up      Vitals:    23 1310 23 1317   BP: (!) 148/78 130/70   Pulse: (!) 113 (!) 111   Resp: 17    Temp: 97.2 °F (36.2 °C)    TempSrc: Temporal    SpO2: 96%    Weight: 130 lb (59 kg)    Height: 5' 3\" (1.6 m)    PainSc:   0 - No pain        Health Maintenance Review: Patient reminded of \"due or due soon\" health maintenance. I have asked the patient to contact his/her primary care provider (PCP) for follow-up on his/her health maintenance. Coordination of Care Questionnaire:  :   1) Have you been to an emergency room, urgent care, or hospitalized since your last visit? If yes, where when, and reason for visit? no       2. Have seen or consulted any other health care provider since your last visit? If yes, where when, and reason for visit? NO      Patient is accompanied by daughter I have received verbal consent from [facility] to discuss any/all medical information while they are present in the room.

## 2023-01-13 NOTE — Clinical Note
2/5/2023    Patient: Carmelo Boateng   YOB: 1955   Date of Visit: 1/13/2023     MD Sherry Mckeon [address]  [phone] [id number]  [facility] [address]    Dear Levon Eubanks MD,      Thank you for referring Ms. Stefanie De La Garza to [facility] for evaluation. My notes for this consultation are attached. If you have questions, please do not hesitate to call me. I look forward to following your patient along with you.       Sincerely,    Elizabeth Hubbard MD

## 2023-01-13 NOTE — PROGRESS NOTES
[facility], MD, [place], Ariel Scotty [place], [place]      Rob Escobar PA-C    April S Ragini, [facility]   Killian Tenzin, [facility]-AG   Lonidorothy Ocampo, FNP-C   Merary Kinney FNP-C    Sadaf Grossman, PCNP-BC       Marti Isaacs [address]    at [address], [address] Christy Willis  22.    [phone]    FAX: [address] [place] [address], [address]    [phone]    FAX: [phone]       Patient Care Team:  Mireille Gray MD as PCP - General (Internal Medicine Physician)  Mireille Gray MD as PCP - [facility] Provider  Nazario Malik MD (Gastroenterology)      Problem List  Date Reviewed: 12/26/2022            Codes Class Noted    Osteopenia ICD-10-CM: M85.80  ICD-9-CM: 733.90  9/20/2017        Autoimmune hepatitis ([facility] ICD-10-CM: K75.4  ICD-9-CM: 571.42  12/24/2022        Vitamin D deficiency ICD-10-CM: E55.9  ICD-9-CM: 268.9  Unknown        Parkinson's disease ([facility] ICD-10-CM: G20  ICD-9-CM: 332.0  2019    Overview Signed 5/10/2022 12:04 PM by Mireille Gray MD     L handed tremor; no Rx as of 5/22             Eczema ICD-10-CM: L30.9  ICD-9-CM: 692.9  2019    Overview Signed 5/10/2022 12:05 PM by Mireille Gray MD     Prev numerous dermatology referrals; currently under care with homeopathy              Eczema flare, change in prednisone manufacturers   Started Sinemet, following up 1 tablet 3x's day. Avoid cellcept. 20 mg prednisone    [facility]   The active problem list, all pertinent past medical history, medications, liver histology, radiologic findings and laboratory findings related to the liver disorder were reviewed and discussed with the patient. The patient is a 75 y.o.  female who was found to have liver transaminases and alkaline phosphatase in 5/2022. The liver enzymes came down with time and normalized by 9/2022, but then increased again in 10/2022. Liver enzymes improved mid- 10/2022 while patient was on a brief course of steroids for eczema but again elevated when steroids were completed. Serologic evaluation for markers of chronic liver disease was positive for ASMA. The most recent imaging of the liver was Ultrasound performed in 5/2022. Results suggest that the liver is normal.      A liver biopsy in 10/2022 demonstrates mild portal inflammation, and portal fibrosis that is greater than would be expected given the degree of inflammation. Assessment of liver fibrosis with Fibroscan was performed in the office today. The result was 13.2 kPa which correlates with stage 3 fibrosis. MRCP performed 12/2022 was negative for bile duct disease. The patient has the following symptoms which could be attributed to the liver disorder:    swelling of the hands,   Severe eczema    The patient is not experiencing the following symptoms which are commonly seen in this liver disorder:   pain in the right side over the liver,     The patient completes all daily activities without any functional limitations. We discussed the importance of starting recommended medication for Parkinson's Disease; this will not negatively impact her liver and will improve quality of life. ASSESSMENT AND PLAN:  Autoimmune Hepatitis   The diagnosis was based upon laboratory studies, serology that was positive for ASMA and liver biopsy. A liver biopsy performed in 10/2022 demonstrates mild portal inflammation, mild PMN, no lobular inflammation, no steatosis, and stage 1 fibrosis portal that is greater than the degree of inflammation.      Fibroscan in 12/2022 was 13.2 kPa with  consistent with stage 3 fibrosis and no hepatic steatosis. Liver transaminases are elevated. ALP is elevated. Liver function is normal.  The platelet count is normal.      Will start prednisone 20 mg daily with the plan to later transition to Cellcept once liver enzymes and alkaline phosphatase have normalized. Discussed side effects of prednisone including elevation of blood sugar, aseptic necrosis of hips, osteoporosis, blurring of vision and increased appetite leading to weight gain. Discussed side effects of Cellcept including diarrhea, lowering of WBC and slight increase risk for infection. Elevation in Ferritin  There is an elevation in ferritin with N\normal iron saturation. The patient does not have hemochromatosis and is unlikely to be a carrier for an HFE gene. Suspect the elevation in ferritin reflects hepatic inflammation. Screening for Hepatocellular Carcinoma  HCC screening is not necessary if the patient has no evidence of cirrhosis. Treatment of other medical problems in patients with chronic liver disease  There are no contraindications for the patient to take most medications that are necessary for treatment of other medical issues. Counseling for alcohol in patients with chronic liver disease  The patient was counseled regarding alcohol consumption and the effect of alcohol on chronic liver disease. The patient does not consume any significant amount of alcohol. Vaccinations   Vaccination for viral hepatitis B is not needed. The patient has serologic evidence of prior exposure or vaccination with immunity. Routine vaccinations against other bacterial and viral agents can be performed as indicated. Annual flu vaccination should be administered if indicated. ALLERGIES  No Known Allergies    MEDICATIONS  Current Outpatient Medications   Medication Sig    predniSONE (DELTASONE) 20 mg tablet Take 1 Tablet by mouth daily (with breakfast).  Indications: liver inflammation resulting from an abnormal immune response    calcium-cholecalciferol, D3, (CALTRATE 600+D) tablet Take 1 Tablet by mouth daily. ascorbic acid, vitamin C, (VITAMIN C) 500 mg tablet Take 1,000 mg by mouth. tacrolimus (PROTOPIC) 0.1 % ointment     ELDERBERRY FRUIT PO      No current facility-administered medications for this visit. SYSTEM REVIEW NOT RELATED TO LIVER DISEASE OR REVIEWED ABOVE:  Constitution systems: Negative for fever, chills, weight gain, weight loss. Eyes: Negative for visual changes. ENT: Negative for sore throat, painful swallowing. Respiratory: Negative for cough, hemoptysis, SOB. Cardiology: Negative for chest pain, palpitations. GI:  Negative for constipation or diarrhea. : Negative for urinary frequency, dysuria, hematuria, nocturia. Skin: (+) Eczema related rash on bilateral hands and forearms. Hematology: Negative for easy bruising, blood clots. Musculo-skelatal: Negative for back pain, muscle pain, weakness. Neurologic: (+) Bilateral hand tremor. Negative for headaches, dizziness, vertigo, memory problems not related to HE. Psychology: Negative for anxiety, depression. FAMILY HISTORY:  The father  of heart disease. The mother had the following chronic disease(s): DM, thyroid disease and  of old age. There is no family history of liver disease. SOCIAL HISTORY:  The patient is . The patient has 2 children, and 3 grandchildren. The patient has never used tobacco products. The patient has never consumed significant amounts of alcohol. The patient used to work as .      PHYSICAL EXAMINATION:  Visit Vitals  /70 (BP 1 Location: Left upper arm, BP Patient Position: Sitting, BP Cuff Size: Adult)   Pulse (!) 111   Temp 97.2 °F (36.2 °C) (Temporal)   Resp 17   Ht 5' 3\" (1.6 m)   Wt 130 lb (59 kg)   SpO2 96%   BMI 23.03 kg/m²     General: No acute distress. Eyes: Sclera anicteric. ENT: No oral lesions.   Thyroid normal.  Nodes: No adenopathy. Skin: No spider angiomata. No jaundice. No palmar erythema. Respiratory: Lungs clear to auscultation. Cardiovascular: Regular heart rate. No murmurs. No JVD. Abdomen: Soft non-tender. Liver size normal to percussion/palpation. Spleen not palpable. No obvious ascites. Extremities: No edema. No muscle wasting. No gross arthritic changes. Neurologic: Alert and oriented. Cranial nerves grossly intact. No asterixis. LABORATORY STUDIES:  [facility] [place] [address] & Units 12/9/2022 10/18/2022   WBC 3.6 - 11.0 K/uL 5.5 12.7 (H)   ANC 1.8 - 8.0 K/UL 3.7 10.1 (H)   HGB 11.5 - 16.0 g/dL 14.2 13.4    - 400 K/uL 171 251   INR 0.9 - 1.1    1.0   AST 15 - 37 U/L 288 (H) 85 (H)   ALT 12 - 78 U/L 356 (H) 157 (H)   Alk Phos 45 - 117 U/L 208 (H) 173 (H)   Bili, Total 0.2 - 1.0 MG/DL 1.4 (H) 1.1 (H)   Bili, Direct 0.0 - 0.2 MG/DL 0.8 (H) 0.6 (H)   Albumin 3.5 - 5.0 g/dL 3.8 3.3 (L)   BUN 6 - 20 MG/DL 13 11   Creat 0.55 - 1.02 MG/DL 0.67 0.63   Na 136 - 145 mmol/L 140 135 (L)   K 3.5 - 5.1 mmol/L 4.1 4.2   Cl 97 - 108 mmol/L 105 102   CO2 21 - 32 mmol/L 30 25   Glucose 65 - 100 mg/dL 120 (H) 132 (H)       SEROLOGIES:  Serologies Latest Ref Rng & Units 5/10/2022   Hep C Ab NONREACTIVE   NONREACTIVE     Serologies Latest Ref Rng & Units 10/18/2022 5/18/2022   Hep B Surface Ag Interp Negative    Negative   Hep B Core Ab, Total Negative    Negative   Hep B Surface Ab mIU/mL  11.40   Hep B Surface Ab Interp NONREACTIVE    REACTIVE (A)   Hep C Ab NONREACTIVE       Ferritin 26 - 388 NG/ (H)    Iron % Saturation 20 - 50 % 10 (L)    ARLEY, IFA  Negative    C-ANCA Neg:<1:20 titer <1:20    P-ANCA Neg:<1:20 titer <1:20    ANCA Neg:<1:20 titer <1:20    ASMCA 0 - 19 Units  22 (H)   M2 Ab 0.0 - 20.0 Units  <20.0   Alpha-1 antitrypsin level 101 - 187 mg/dL 272 (H)      LIVER HISTOLOGY:  10/2022.   Slides reviewed by MLS.mild portal inflammation, mild PMN, no lobular inflammation, no steatosis, and stage 1 fibrosis portal that is greater than the degree of inflammation. 12/2022. FibroScan performed at [facility] [address] [place] [address]. EkPa was 13.2. IQR/med 8%. . The results suggested a fibrosis level of F3. The CAP score suggests there is no significant hepatic steatosis. ENDOSCOPIC PROCEDURES:  Not available or performed    RADIOLOGY:  5/2022. Ultrasound of liver. Normal appearing liver. No liver mass lesions. 12/2022. MRI Abd/MRCP. Negative for PSC. Moderate gallstone, numerous tiny gallstones. OTHER TESTING:  Not available or performed    FOLLOW-UP:  All of the issues listed above in the Assessment and Plan were discussed with the patient. All questions were answered. The patient expressed a clear understanding of the above. [address] [address] in 4 weeks for routine monitoring and to assess response to prednisone.      Regina Villavicencio [facility] At [facility], [address] [place] [address] 87. 5249 Qi Morales MD  823 [facility] of [address] [facility] [address] 77 [address] Jad Hewitt [address] [place], [place] [address] 22. [phone]  FAX:  [address]

## 2023-01-14 LAB
ALBUMIN SERPL-MCNC: 3.7 G/DL (ref 3.5–5)
ALBUMIN/GLOB SERPL: 1.1 (ref 1.1–2.2)
ALP SERPL-CCNC: 123 U/L (ref 45–117)
ALT SERPL-CCNC: 11 U/L (ref 12–78)
ANION GAP SERPL CALC-SCNC: 9 MMOL/L (ref 5–15)
AST SERPL-CCNC: 22 U/L (ref 15–37)
BASOPHILS # BLD: 0.1 K/UL (ref 0–0.1)
BASOPHILS NFR BLD: 1 % (ref 0–1)
BILIRUB DIRECT SERPL-MCNC: 0.2 MG/DL (ref 0–0.2)
BILIRUB SERPL-MCNC: 0.6 MG/DL (ref 0.2–1)
BUN SERPL-MCNC: 14 MG/DL (ref 6–20)
BUN/CREAT SERPL: 18 (ref 12–20)
CALCIUM SERPL-MCNC: 10 MG/DL (ref 8.5–10.1)
CHLORIDE SERPL-SCNC: 99 MMOL/L (ref 97–108)
CO2 SERPL-SCNC: 27 MMOL/L (ref 21–32)
CREAT SERPL-MCNC: 0.79 MG/DL (ref 0.55–1.02)
DIFFERENTIAL METHOD BLD: ABNORMAL
EOSINOPHIL # BLD: 0.1 K/UL (ref 0–0.4)
EOSINOPHIL NFR BLD: 1 % (ref 0–7)
ERYTHROCYTE [DISTWIDTH] IN BLOOD BY AUTOMATED COUNT: 12.9 % (ref 11.5–14.5)
GLOBULIN SER CALC-MCNC: 3.5 G/DL (ref 2–4)
GLUCOSE SERPL-MCNC: 193 MG/DL (ref 65–100)
HCT VFR BLD AUTO: 40.7 % (ref 35–47)
HGB BLD-MCNC: 13.7 G/DL (ref 11.5–16)
IMM GRANULOCYTES # BLD AUTO: 0.1 K/UL (ref 0–0.04)
IMM GRANULOCYTES NFR BLD AUTO: 1 % (ref 0–0.5)
LYMPHOCYTES # BLD: 0.7 K/UL (ref 0.8–3.5)
LYMPHOCYTES NFR BLD: 8 % (ref 12–49)
MCH RBC QN AUTO: 29.7 PG (ref 26–34)
MCHC RBC AUTO-ENTMCNC: 33.7 G/DL (ref 30–36.5)
MCV RBC AUTO: 88.3 FL (ref 80–99)
MONOCYTES # BLD: 0.3 K/UL (ref 0–1)
MONOCYTES NFR BLD: 3 % (ref 5–13)
NEUTS SEG # BLD: 7.4 K/UL (ref 1.8–8)
NEUTS SEG NFR BLD: 86 % (ref 32–75)
NRBC # BLD: 0 K/UL (ref 0–0.01)
NRBC BLD-RTO: 0 PER 100 WBC
PLATELET # BLD AUTO: 145 K/UL (ref 150–400)
PMV BLD AUTO: 11.3 FL (ref 8.9–12.9)
POTASSIUM SERPL-SCNC: 4.2 MMOL/L (ref 3.5–5.1)
PROT SERPL-MCNC: 7.2 G/DL (ref 6.4–8.2)
RBC # BLD AUTO: 4.61 M/UL (ref 3.8–5.2)
RBC MORPH BLD: ABNORMAL
SODIUM SERPL-SCNC: 135 MMOL/L (ref 136–145)
WBC # BLD AUTO: 8.7 K/UL (ref 3.6–11)

## 2023-02-07 LAB
ALBUMIN SERPL-MCNC: 4.3 G/DL (ref 3.8–4.8)
ALP SERPL-CCNC: 112 IU/L (ref 44–121)
ALT SERPL-CCNC: 9 IU/L (ref 0–32)
AST SERPL-CCNC: 17 IU/L (ref 0–40)
BASOPHILS # BLD AUTO: 0 X10E3/UL (ref 0–0.2)
BASOPHILS NFR BLD AUTO: 0 %
BILIRUB DIRECT SERPL-MCNC: 0.16 MG/DL (ref 0–0.4)
BILIRUB SERPL-MCNC: 0.4 MG/DL (ref 0–1.2)
BUN SERPL-MCNC: 15 MG/DL (ref 8–27)
BUN/CREAT SERPL: 21 (ref 12–28)
CALCIUM SERPL-MCNC: 10 MG/DL (ref 8.7–10.3)
CHLORIDE SERPL-SCNC: 97 MMOL/L (ref 96–106)
CO2 SERPL-SCNC: 24 MMOL/L (ref 20–29)
CREAT SERPL-MCNC: 0.72 MG/DL (ref 0.57–1)
EGFRCR SERPLBLD CKD-EPI 2021: 91 ML/MIN/1.73
EOSINOPHIL # BLD AUTO: 0 X10E3/UL (ref 0–0.4)
EOSINOPHIL NFR BLD AUTO: 0 %
ERYTHROCYTE [DISTWIDTH] IN BLOOD BY AUTOMATED COUNT: 12.8 % (ref 11.7–15.4)
GLUCOSE SERPL-MCNC: 264 MG/DL (ref 70–99)
HCT VFR BLD AUTO: 40.5 % (ref 34–46.6)
HGB BLD-MCNC: 13.9 G/DL (ref 11.1–15.9)
IMM GRANULOCYTES # BLD AUTO: 0.1 X10E3/UL (ref 0–0.1)
IMM GRANULOCYTES NFR BLD AUTO: 1 %
LYMPHOCYTES # BLD AUTO: 0.5 X10E3/UL (ref 0.7–3.1)
LYMPHOCYTES NFR BLD AUTO: 5 %
MCH RBC QN AUTO: 30.6 PG (ref 26.6–33)
MCHC RBC AUTO-ENTMCNC: 34.3 G/DL (ref 31.5–35.7)
MCV RBC AUTO: 89 FL (ref 79–97)
MONOCYTES # BLD AUTO: 0.1 X10E3/UL (ref 0.1–0.9)
MONOCYTES NFR BLD AUTO: 1 %
NEUTROPHILS # BLD AUTO: 9.7 X10E3/UL (ref 1.4–7)
NEUTROPHILS NFR BLD AUTO: 93 %
PLATELET # BLD AUTO: 223 X10E3/UL (ref 150–450)
POTASSIUM SERPL-SCNC: 4.1 MMOL/L (ref 3.5–5.2)
PROT SERPL-MCNC: 6.5 G/DL (ref 6–8.5)
RBC # BLD AUTO: 4.54 X10E6/UL (ref 3.77–5.28)
SODIUM SERPL-SCNC: 138 MMOL/L (ref 134–144)
WBC # BLD AUTO: 10.4 X10E3/UL (ref 3.4–10.8)

## 2023-02-24 ENCOUNTER — OFFICE VISIT (OUTPATIENT)
Dept: HEMATOLOGY | Age: 68
End: 2023-02-24
Payer: MEDICARE

## 2023-02-24 VITALS
SYSTOLIC BLOOD PRESSURE: 145 MMHG | BODY MASS INDEX: 23.92 KG/M2 | TEMPERATURE: 96.9 F | DIASTOLIC BLOOD PRESSURE: 76 MMHG | WEIGHT: 135 LBS | RESPIRATION RATE: 16 BRPM | HEART RATE: 95 BPM | HEIGHT: 63 IN | OXYGEN SATURATION: 98 %

## 2023-02-24 DIAGNOSIS — K75.4 AUTOIMMUNE HEPATITIS (HCC): Primary | ICD-10-CM

## 2023-02-24 RX ORDER — AZATHIOPRINE 50 MG/1
50 TABLET ORAL DAILY
Qty: 90 TABLET | Refills: 3 | Status: SHIPPED | OUTPATIENT
Start: 2023-02-24

## 2023-02-24 RX ORDER — PREDNISONE 20 MG/1
20 TABLET ORAL
Qty: 30 TABLET | Refills: 0 | Status: SHIPPED | OUTPATIENT
Start: 2023-02-24

## 2023-02-24 NOTE — PROGRESS NOTES
Identified pt with two pt identifiers(name and ). Reviewed record in preparation for visit and have obtained necessary documentation. No chief complaint on file. Vitals:    23 1158   BP: (!) 145/76   Pulse: 95   Resp: 16   Temp: 96.9 °F (36.1 °C)   TempSrc: Temporal   SpO2: 98%   Weight: 135 lb (61.2 kg)   Height: 5' 3\" (1.6 m)   PainSc:   0 - No pain       Health Maintenance Review: Patient reminded of \"due or due soon\" health maintenance. I have asked the patient to contact his/her primary care provider (PCP) for follow-up on his/her health maintenance. Patient has been having increasingly worse anxiety and 3 panic attacks in the last year due to concerns over treatment    Coordination of Care Questionnaire:  :   1) Have you been to an emergency room, urgent care, or hospitalized since your last visit? If yes, where when, and reason for visit? no       2. Have seen or consulted any other health care provider since your last visit? If yes, where when, and reason for visit? YES, Neurology for Parkinsons follow up      Patient is accompanied by daughter in [address] have received verbal consent from [facility] to discuss any/all medical information while they are present in the room.

## 2023-02-24 NOTE — Clinical Note
3/19/2023    Patient: Lata Maria   YOB: 1955   Date of Visit: 2/24/2023     MD Sherry Ochoa Marmesha Alme[address]  [address] [address]  Via In [facility] [address]    Dear Adeola Naranjo MD,      Thank you for referring Ms. Stefanie De La Garza [facility]  [facility]. My notes for this consultation are attached. If you have questions, please do not hesitate to call me. I look forward to following your patient along with you.       Sincerely,    Beth Mcgee MD

## 2023-02-24 NOTE — PROGRESS NOTES
[facility], MD, [address], [facility] Mati, TYLER Eid, [facility]-BC   Brad Hercules, [facility]-   Marny Najjar, FNP-C   Verdell Goldberg, FNP-C    Keely West, [facility]       Marti Isaacs [facility] 136    at [address], [facility] Christy Willis  22.    [phone]    FAX: [address] [place] [address]    at [address], [address]    [phone]    FAX: [phone]       Patient Care Team:  Khalida Torres MD as PCP - General (Internal Medicine Physician)  Khalida Torres MD as PCP - [facility] HOSPITAL [facility] Empaneled Provider  Delfino Rao MD (Gastroenterology)      Problem List  Date Reviewed: 2/5/2023            Codes Class Noted    Osteopenia ICD-10-CM: M85.80  ICD-9-CM: 733.90  9/20/2017        Autoimmune hepatitis ([facility] ICD-10-CM: K75.4  ICD-9-CM: 571.42  12/24/2022        Vitamin D deficiency ICD-10-CM: E55.9  ICD-9-CM: 268.9  Unknown        Parkinson's disease ([facility] [facility] ICD-10-CM: G20  ICD-9-CM: 332.0  2019    Overview Signed 5/10/2022 12:04 PM by Khalida Torres MD     L handed tremor; no Rx as of 5/22             Eczema ICD-10-CM: L30.9  ICD-9-CM: 692.9  2019    Overview Signed 5/10/2022 12:05 PM by Khalida Torres MD     Prev numerous dermatology referrals; currently under care with homeopathy            Sinemet same dose  Weight up  Eczema  Energy up    Umm Wong is being seen at [address] for management of autoimmune hepatitis. The active problem list, all pertinent past medical history, medications, liver histology, radiologic findings and laboratory findings related to the liver disorder were reviewed and discussed with the patient. The patient is a 75 y.o.   female who was found to have elevated liver transaminases and alkaline phosphatase in 5/2022. The liver enzymes came down with time and normalized by 9/2022, but then increased again in 10/2022. Liver enzymes improved mid- 10/2022 while patient was on a brief course of steroids for eczema but again elevated when steroids were completed. Serologic evaluation for markers of chronic liver disease was positive for ASMA. The most recent imaging of the liver was Ultrasound performed in 5/2022. Results suggest that the liver is normal.      A liver biopsy in 10/2022 demonstrates mild portal inflammation, and portal fibrosis that is greater than would be expected given the degree of inflammation. Fibroscan performed 12/2022 demonstrated 13.2 kPa which correlates with stage 3 fibrosis. MRCP performed 12/2022 was negative for bile duct disease. The patient was started on 20 mg prednisone daily in 12/2022 to treat autoimmune hepatitis and her liver enzymes have normalized. The patient has the following symptoms which could be attributed to the liver disorder:    swelling of the hands,   Severe eczema (this had initially cleared up on prednisone but she is currently experiencing another flare). The patient is not experiencing the following symptoms which are commonly seen in this liver disorder:   pain in the right side over the liver,     The patient completes all daily activities without any functional limitations. Since the last visit, the patient started on 100 mg Sinemet TID to treat her Parkinson's disease. She is not yet experiencing much benefit from this medication but relays they will up-titrate the dose at her next neurology visit. ASSESSMENT AND PLAN:  Autoimmune Hepatitis   The diagnosis was based upon laboratory studies, serology that was positive for ASMA and liver biopsy.     A liver biopsy performed in 10/2022 demonstrates mild portal inflammation, mild PMN, no lobular inflammation, no steatosis, and stage 1 fibrosis portal that is greater than the degree of inflammation. Fibroscan in 12/2022 was 13.2 kPa with  consistent with stage 3 fibrosis and no hepatic steatosis. The patient was started on prednisone 20 mg daily in 12/2022. She is tolerating the medication well. Her glucose level was elevated on labs performed today; we will discuss at next office visit and will continue to monitor. Once liver enzymes and alkaline phosphatase have normalized for a number of months we will transition to an alternate medication such as Imuran. We will avoid use of Cellcept given its interaction with Sinemet resulting in potential CNS depression. Liver transaminases are normal.  ALP is mildly elevated. Liver function is normal.  The platelet count is depressed. Elevation in Ferritin  There is an elevation in ferritin with low iron saturation. The patient does not have hemochromatosis and is unlikely to be a carrier for an HFE gene. Suspect the elevation in ferritin reflects hepatic inflammation. Screening for Hepatocellular Carcinoma  HCC screening is not necessary if the patient has no evidence of cirrhosis. Treatment of other medical problems in patients with chronic liver disease  There are no contraindications for the patient to take most medications that are necessary for treatment of other medical issues. Counseling for alcohol in patients with chronic liver disease  The patient was counseled regarding alcohol consumption and the effect of alcohol on chronic liver disease. The patient does not consume any significant amount of alcohol. Vaccinations   Vaccination for viral hepatitis B is not needed. The patient has serologic evidence of prior exposure or vaccination with immunity. Routine vaccinations against other bacterial and viral agents can be performed as indicated. Annual flu vaccination should be administered if indicated.     ALLERGIES  No Known Allergies    MEDICATIONS  Current Outpatient Medications   Medication Sig    multivit-min/iron/folic/lutein (CENTRUM SILVER WOMEN PO) Take  by mouth.    carbidopa-levodopa (SINEMET)  mg per tablet Take 1 Tablet by mouth three (3) times daily. predniSONE (DELTASONE) 20 mg tablet Take 1 Tablet by mouth daily (with breakfast). Indications: liver inflammation resulting from an abnormal immune response    calcium-cholecalciferol, D3, (CALTRATE 600+D) tablet Take 1 Tablet by mouth two (2) times a day. ascorbic acid, vitamin C, (VITAMIN C) 500 mg tablet Take 1,000 mg by mouth two (2) times a day. ELDERBERRY FRUIT PO Take  by mouth two (2) times a day. tacrolimus (PROTOPIC) 0.1 % ointment  (Patient not taking: Reported on 2023)     No current facility-administered medications for this visit. SYSTEM REVIEW NOT RELATED TO LIVER DISEASE OR REVIEWED ABOVE:  Constitution systems: Negative for fever, chills, weight gain, weight loss. Eyes: Negative for visual changes. ENT: Negative for sore throat, painful swallowing. Respiratory: Negative for cough, hemoptysis, SOB. Cardiology: Negative for chest pain, palpitations. GI:  Negative for constipation or diarrhea. : Negative for urinary frequency, dysuria, hematuria, nocturia. Skin: (+) Eczema related rash on bilateral hands, forearms and face. Hematology: Negative for easy bruising, blood clots. Musculo-skelatal: Negative for back pain, muscle pain, weakness. Neurologic: (+) Bilateral hand tremor. Negative for headaches, dizziness, vertigo, memory problems not related to HE. Psychology: Negative for anxiety, depression. FAMILY HISTORY:  The father  of heart disease. The mother had the following chronic disease(s): DM, thyroid disease and  of old age. There is no family history of liver disease. SOCIAL HISTORY:  The patient is . The patient has 2 children, and 3 grandchildren.     The patient has never used tobacco products. The patient has never consumed significant amounts of alcohol. The patient used to work as .      PHYSICAL EXAMINATION:  Visit Vitals  BP (!) 145/76 (BP 1 Location: Right upper arm, BP Patient Position: Sitting, BP Cuff Size: Adult)   Pulse 95   Temp 96.9 °F (36.1 °C) (Temporal)   Resp 16   Ht 5' 3\" (1.6 m)   Wt 135 lb (61.2 kg)   SpO2 98%   BMI 23.91 kg/m²     General: No acute distress. Eyes: Sclera anicteric. ENT: No oral lesions. Thyroid normal.  Nodes: No adenopathy. Skin: No spider angiomata. No jaundice. No palmar erythema. Respiratory: Lungs clear to auscultation. Cardiovascular: Regular heart rate. No murmurs. No JVD. Abdomen: Soft non-tender. Liver size normal to percussion/palpation. Spleen not palpable. No obvious ascites. Extremities: No edema. No muscle wasting. No gross arthritic changes. Neurologic: Alert and oriented. Cranial nerves grossly intact. No asterixis.     LABORATORY STUDIES:  [facility] of [address] [place] [address] & Units 1/13/2023 12/9/2022   WBC 3.6 - 11.0 K/uL 8.7 5.5   ANC 1.8 - 8.0 K/UL 7.4 3.7   HGB 11.5 - 16.0 g/dL 13.7 14.2    - 400 K/uL 145 (L) 171   INR 0.9 - 1.1       AST 15 - 37 U/L 22 288 (H)   ALT 12 - 78 U/L 11 (L) 356 (H)   Alk Phos 45 - 117 U/L 123 (H) 208 (H)   Bili, Total 0.2 - 1.0 MG/DL 0.6 1.4 (H)   Bili, Direct 0.0 - 0.2 MG/DL 0.2 0.8 (H)   Albumin 3.5 - 5.0 g/dL 3.7 3.8   BUN 6 - 20 MG/DL 14 13   Creat 0.55 - 1.02 MG/DL 0.79 0.67   Na 136 - 145 mmol/L 135 (L) 140   K 3.5 - 5.1 mmol/L 4.2 4.1   Cl 97 - 108 mmol/L 99 105   CO2 21 - 32 mmol/L 27 30   Glucose 65 - 100 mg/dL 193 (H) 120 (H)       SEROLOGIES:  Serologies Latest Ref Rng & Units 5/10/2022   Hep C Ab NONREACTIVE   NONREACTIVE     Serologies Latest Ref Rng & Units 10/18/2022 5/18/2022   Hep B Surface Ag Interp Negative    Negative   Hep B Core Ab, Total Negative    Negative   Hep B Surface Ab mIU/mL  11.40   Hep B Surface Ab Interp NONREACTIVE    REACTIVE (A)   Hep C Ab NONREACTIVE       Ferritin 26 - 388 NG/ (H)    Iron % Saturation 20 - 50 % 10 (L)    ARLEY, IFA  Negative    C-ANCA Neg:<1:20 titer <1:20    P-ANCA Neg:<1:20 titer <1:20    ANCA Neg:<1:20 titer <1:20    ASMCA 0 - 19 Units  22 (H)   M2 Ab 0.0 - 20.0 Units  <20.0   Alpha-1 antitrypsin level 101 - 187 mg/dL 272 (H)      LIVER HISTOLOGY:  10/2022. Slides reviewed by MLS.mild portal inflammation, mild PMN, no lobular inflammation, no steatosis, and stage 1 fibrosis portal that is greater than the degree of inflammation. 12/2022. [facility]. EkPa was 13.2. IQR/med 8%. . The results suggested a fibrosis level of F3. The CAP score suggests there is no significant hepatic steatosis. ENDOSCOPIC PROCEDURES:  Not available or performed    RADIOLOGY:  5/2022. Ultrasound of liver. Normal appearing liver. No liver mass lesions. 12/2022. MRI Abd/MRCP. Negative for PSC. Moderate gallstone, numerous tiny gallstones. OTHER TESTING:  Not available or performed    FOLLOW-UP:  All of the issues listed above in the Assessment and Plan were discussed with the patient. All questions were answered. The patient expressed a clear understanding of the above. [address] [address] in 6 weeks for routine monitoring and to assess response to prednisone.      Jamal Sarmienot, [facility]  [facility]  MOB Mima, 2000 [facility] 17. 8129 Qi Morales MD  822 [facility] of [address] 77 [address] Jad Hewitt [address] [place] [address] 22. [phone]  FAX:  [address]

## 2023-03-10 LAB
ALBUMIN SERPL-MCNC: 4.4 G/DL (ref 3.8–4.8)
ALP SERPL-CCNC: 100 IU/L (ref 44–121)
ALT SERPL-CCNC: 16 IU/L (ref 0–32)
AST SERPL-CCNC: 20 IU/L (ref 0–40)
BASOPHILS # BLD AUTO: 0.1 X10E3/UL (ref 0–0.2)
BASOPHILS NFR BLD AUTO: 1 %
BILIRUB DIRECT SERPL-MCNC: 0.15 MG/DL (ref 0–0.4)
BILIRUB SERPL-MCNC: 0.4 MG/DL (ref 0–1.2)
BUN SERPL-MCNC: 17 MG/DL (ref 8–27)
BUN/CREAT SERPL: 20 (ref 12–28)
CALCIUM SERPL-MCNC: 9.7 MG/DL (ref 8.7–10.3)
CHLORIDE SERPL-SCNC: 97 MMOL/L (ref 96–106)
CO2 SERPL-SCNC: 22 MMOL/L (ref 20–29)
CREAT SERPL-MCNC: 0.84 MG/DL (ref 0.57–1)
EGFRCR SERPLBLD CKD-EPI 2021: 76 ML/MIN/1.73
EOSINOPHIL # BLD AUTO: 0 X10E3/UL (ref 0–0.4)
EOSINOPHIL NFR BLD AUTO: 0 %
ERYTHROCYTE [DISTWIDTH] IN BLOOD BY AUTOMATED COUNT: 12.7 % (ref 11.7–15.4)
GLUCOSE SERPL-MCNC: 207 MG/DL (ref 70–99)
HCT VFR BLD AUTO: 40.5 % (ref 34–46.6)
HGB BLD-MCNC: 14.2 G/DL (ref 11.1–15.9)
IMM GRANULOCYTES # BLD AUTO: 0.2 X10E3/UL (ref 0–0.1)
IMM GRANULOCYTES NFR BLD AUTO: 2 %
LYMPHOCYTES # BLD AUTO: 0.7 X10E3/UL (ref 0.7–3.1)
LYMPHOCYTES NFR BLD AUTO: 6 %
MCH RBC QN AUTO: 31.8 PG (ref 26.6–33)
MCHC RBC AUTO-ENTMCNC: 35.1 G/DL (ref 31.5–35.7)
MCV RBC AUTO: 91 FL (ref 79–97)
MONOCYTES # BLD AUTO: 0.2 X10E3/UL (ref 0.1–0.9)
MONOCYTES NFR BLD AUTO: 2 %
NEUTROPHILS # BLD AUTO: 9.7 X10E3/UL (ref 1.4–7)
NEUTROPHILS NFR BLD AUTO: 89 %
PLATELET # BLD AUTO: 235 X10E3/UL (ref 150–450)
POTASSIUM SERPL-SCNC: 4.5 MMOL/L (ref 3.5–5.2)
PROT SERPL-MCNC: 6.9 G/DL (ref 6–8.5)
RBC # BLD AUTO: 4.46 X10E6/UL (ref 3.77–5.28)
SODIUM SERPL-SCNC: 136 MMOL/L (ref 134–144)
WBC # BLD AUTO: 10.9 X10E3/UL (ref 3.4–10.8)

## 2023-03-21 ENCOUNTER — OFFICE VISIT (OUTPATIENT)
Dept: HEMATOLOGY | Age: 68
End: 2023-03-21
Payer: MEDICARE

## 2023-03-21 VITALS
DIASTOLIC BLOOD PRESSURE: 85 MMHG | RESPIRATION RATE: 17 BRPM | HEART RATE: 99 BPM | TEMPERATURE: 97.9 F | HEIGHT: 63 IN | SYSTOLIC BLOOD PRESSURE: 128 MMHG | BODY MASS INDEX: 24.27 KG/M2 | WEIGHT: 137 LBS | OXYGEN SATURATION: 96 %

## 2023-03-21 DIAGNOSIS — K75.4 AUTOIMMUNE HEPATITIS (HCC): Primary | ICD-10-CM

## 2023-03-21 PROCEDURE — G8536 NO DOC ELDER MAL SCRN: HCPCS | Performed by: NURSE PRACTITIONER

## 2023-03-21 PROCEDURE — 3017F COLORECTAL CA SCREEN DOC REV: CPT | Performed by: NURSE PRACTITIONER

## 2023-03-21 PROCEDURE — G8427 DOCREV CUR MEDS BY ELIG CLIN: HCPCS | Performed by: NURSE PRACTITIONER

## 2023-03-21 PROCEDURE — 1123F ACP DISCUSS/DSCN MKR DOCD: CPT | Performed by: NURSE PRACTITIONER

## 2023-03-21 PROCEDURE — G8399 PT W/DXA RESULTS DOCUMENT: HCPCS | Performed by: NURSE PRACTITIONER

## 2023-03-21 PROCEDURE — 1090F PRES/ABSN URINE INCON ASSESS: CPT | Performed by: NURSE PRACTITIONER

## 2023-03-21 PROCEDURE — G8432 DEP SCR NOT DOC, RNG: HCPCS | Performed by: NURSE PRACTITIONER

## 2023-03-21 PROCEDURE — G9899 SCRN MAM PERF RSLTS DOC: HCPCS | Performed by: NURSE PRACTITIONER

## 2023-03-21 PROCEDURE — G8420 CALC BMI NORM PARAMETERS: HCPCS | Performed by: NURSE PRACTITIONER

## 2023-03-21 PROCEDURE — 1101F PT FALLS ASSESS-DOCD LE1/YR: CPT | Performed by: NURSE PRACTITIONER

## 2023-03-21 PROCEDURE — 99214 OFFICE O/P EST MOD 30 MIN: CPT | Performed by: NURSE PRACTITIONER

## 2023-03-21 RX ORDER — PREDNISONE 5 MG/1
15 TABLET ORAL DAILY
Qty: 90 TABLET | Refills: 3 | Status: SHIPPED | OUTPATIENT
Start: 2023-03-21

## 2023-03-21 NOTE — PROGRESS NOTES
Formerly [facility], MD, [place], Ariel Scottyfabrizio [place], [place]      TYLER Bray, [facility] Center[facility]-BC   David Hunt, [facility]-AG   Scarlett Vogt, FNP-GABBI Hawkins, FNP-C    Kelton Azevedo, PCNP-BC       Marti Isaacs [address]    at [address], [facility] Christy Willis  22.    [phone]    FAX: [address] [place] [address]    at [address], [address]    [phone]    FAX: [phone]       Patient Care Team:  Ravinder Gonzalez MD as PCP - General (Internal Medicine Physician)  Ravinder Gonzalez MD as PCP - [facility]  Tej Al MD (Gastroenterology)      Problem List  Date Reviewed: 3/19/2023            Codes Class Noted    Osteopenia ICD-10-CM: M85.80  ICD-9-CM: 733.90  9/20/2017        Autoimmune hepatitis ([facility] ICD-10-CM: K75.4  ICD-9-CM: 571.42  12/24/2022        Vitamin D deficiency ICD-10-CM: E55.9  ICD-9-CM: 268.9  Unknown        Parkinson's disease ([facility] ICD-10-CM: G20  ICD-9-CM: 332.0  2019    Overview Signed 5/10/2022 12:04 PM by Ravinder Gonzalez MD     L handed tremor; no Rx as of 5/22             Eczema ICD-10-CM: L30.9  ICD-9-CM: 692.9  2019    Overview Signed 5/10/2022 12:05 PM by Ravinder Gonzalez MD     Prev numerous dermatology referrals; currently under care with homeopathy                Vanessa James is being seen at [address] for management of autoimmune hepatitis. The active problem list, all pertinent past medical history, medications, liver histology, radiologic findings and laboratory findings related to the liver disorder were reviewed and discussed with the patient. The patient is a 75 y.o.   female who was found to have elevated liver transaminases and alkaline phosphatase in 5/2022. The liver enzymes came down with time and normalized by 9/2022, but then increased again in 10/2022. Liver enzymes improved mid- 10/2022 while patient was on a brief course of steroids for eczema but again elevated when steroids were completed. Serologic evaluation for markers of chronic liver disease was positive for ASMA. The most recent imaging of the liver was Ultrasound performed in 5/2022. Results suggest that the liver is normal.      A liver biopsy in 10/2022 demonstrates mild portal inflammation, and portal fibrosis that is greater than would be expected given the degree of inflammation. Fibroscan performed 12/2022 demonstrated 13.2 kPa which correlates with stage 3 fibrosis. MRCP performed 12/2022 was negative for bile duct disease. The patient was started on 20 mg prednisone daily in 12/2022 to treat autoimmune hepatitis and her liver enzymes have normalized. She reports improvement in energy and is happy she has been able to gain some weight. Imuran 50 mg daily was added 2/2023. The patient has the following symptoms which could be attributed to the liver disorder:    swelling of the hands,   Severe eczema (now resolved). The patient is not experiencing the following symptoms which are commonly seen in this liver disorder:   fatigue,  pain in the right side over the liver. The patient completes all daily activities without any functional limitations. The patient continues on 100 mg Sinemet TID to treat her Parkinson's disease. This is being closely monitored by neurology. ASSESSMENT AND PLAN:  Autoimmune Hepatitis   The diagnosis was based upon laboratory studies, serology that was positive for ASMA and liver biopsy. A liver biopsy performed in 10/2022 demonstrates mild portal inflammation, mild PMN, no lobular inflammation, no steatosis, and stage 1 fibrosis portal that is greater than the degree of inflammation.      Fibroscan in 12/2022 was 13.2 kPa with  consistent with stage 3 fibrosis and no hepatic steatosis. We will repeat Fibroscan 7/2023 to assess for progression/regression of cirrhosis. The patient was started on prednisone 20 mg daily in 12/2022. She is tolerating the medication well. Reducing dose to 15 mg daily in clinic today. Added Imuran 50 mg daily 2/2023. We will avoid use of Cellcept given its interaction with Sinemet resulting in potential CNS depression. Her glucose level remains elevated on recent labs. She has follow up scheduled with PCP in early April. Liver transaminases are normal.  ALP has normalized. Liver function is normal.  The platelet count is normal.      Elevation in Ferritin  There is an elevation in ferritin with low iron saturation. The patient does not have hemochromatosis and is unlikely to be a carrier for an HFE gene. Suspect the elevation in ferritin reflects hepatic inflammation. Iron studies will be repeated with April labs. Screening for Hepatocellular Carcinoma  HCC screening is not necessary if the patient has no evidence of cirrhosis. Treatment of other medical problems in patients with chronic liver disease  There are no contraindications for the patient to take most medications that are necessary for treatment of other medical issues. Counseling for alcohol in patients with chronic liver disease  The patient was counseled regarding alcohol consumption and the effect of alcohol on chronic liver disease. The patient does not consume any significant amount of alcohol. Vaccinations   Vaccination for viral hepatitis B is not needed. The patient has serologic evidence of prior exposure or vaccination with immunity. Routine vaccinations against other bacterial and viral agents can be performed as indicated. Annual flu vaccination should be administered if indicated.     ALLERGIES  No Known Allergies    MEDICATIONS  Current Outpatient Medications   Medication Sig predniSONE (DELTASONE) 5 mg tablet Take 3 Tablets by mouth daily. Indications: liver inflammation resulting from an abnormal immune response    multivit-min/iron/folic/lutein (CENTRUM SILVER WOMEN PO) Take  by mouth daily. azaTHIOprine (IMURAN) 50 mg tablet Take 1 Tablet by mouth daily. Indications: liver inflammation resulting from an abnormal immune response    carbidopa-levodopa (SINEMET)  mg per tablet Take 1 Tablet by mouth three (3) times daily. calcium-cholecalciferol, D3, (CALTRATE 600+D) tablet Take 1 Tablet by mouth two (2) times a day. ascorbic acid, vitamin C, (VITAMIN C) 1,000 mg tablet Take 1,000 mg by mouth two (2) times a day. ELDERBERRY FRUIT PO Take  by mouth two (2) times a day. No current facility-administered medications for this visit. SYSTEM REVIEW NOT RELATED TO LIVER DISEASE OR REVIEWED ABOVE:  Constitution systems: Negative for fever, chills, weight gain, weight loss. Eyes: Negative for visual changes. ENT: Negative for sore throat, painful swallowing. Respiratory: Negative for cough, hemoptysis, SOB. Cardiology: Negative for chest pain, palpitations. GI:  Negative for constipation or diarrhea. : Negative for urinary frequency, dysuria, hematuria, nocturia. Skin: Negative for rash. Hematology: Negative for easy bruising, blood clots. Musculo-skelatal: Negative for back pain, muscle pain, weakness. Neurologic: (+) Bilateral hand tremor. Negative for headaches, dizziness, vertigo, memory problems not related to HE. Psychology: Negative for anxiety, depression. FAMILY HISTORY:  The father  of heart disease. The mother had the following chronic disease(s): DM, thyroid disease and  of old age. There is no family history of liver disease. SOCIAL HISTORY:  The patient is . The patient has 2 children, and 3 grandchildren. The patient has never used tobacco products.     The patient has never consumed significant amounts of alcohol. The patient used to work as .      PHYSICAL EXAMINATION:  Visit Vitals  /85 (BP 1 Location: Left upper arm, BP Patient Position: Sitting, BP Cuff Size: Adult)   Pulse 99   Temp 97.9 °F (36.6 °C) (Temporal)   Resp 17   Ht 5' 3\" (1.6 m)   Wt 137 lb (62.1 kg)   SpO2 96%   BMI 24.27 kg/m²     General: No acute distress. Eyes: Sclera anicteric. ENT: No oral lesions. Thyroid normal.  Nodes: No adenopathy. Skin: No spider angiomata. No jaundice. No palmar erythema. Respiratory: Lungs clear to auscultation. Cardiovascular: Regular heart rate. No murmurs. No JVD. Abdomen: Soft non-tender. Liver size normal to percussion/palpation. Spleen not palpable. No obvious ascites. Extremities: No edema. No muscle wasting. No gross arthritic changes. Neurologic: Alert and oriented. Cranial nerves grossly intact. No asterixis.     LABORATORY STUDIES:  Liver [employer] of [address] [place] [address] 3/9/2023 2/6/2023 1/13/2023   WBC 3.4 - 10.8 x10E3/uL 10.9 (H) 10.4 8.7   ANC 1.4 - 7.0 x10E3/uL 9.7 (H) 9.7 (H) 7.4   HGB 11.1 - 15.9 g/dL 14.2 13.9 13.7    - 450 x10E3/uL 235 223 145 (L)   INR 0.9 - 1.1        AST 0 - 40 IU/L 20 17 22   ALT 0 - 32 IU/L 16 9 11 (L)   Alk Phos 44 - 121 IU/L 100 112 123 (H)   Bili, Total 0.0 - 1.2 mg/dL 0.4 0.4 0.6   Bili, Direct 0.00 - 0.40 mg/dL 0.15 0.16 0.2   Albumin 3.8 - 4.8 g/dL 4.4 4.3 3.7   BUN 8 - 27 mg/dL 17 15 14   Creat 0.57 - 1.00 mg/dL 0.84 0.72 0.79   Na 134 - 144 mmol/L 136 138 135 (L)   K 3.5 - 5.2 mmol/L 4.5 4.1 4.2   Cl 96 - 106 mmol/L 97 97 99   CO2 20 - 29 mmol/L 22 24 27   Glucose 70 - 99 mg/dL 207 (H) 264 (H) 193 (H)       SEROLOGIES:  Serologies Latest Ref Rng & Units 5/10/2022   Hep C Ab NONREACTIVE   NONREACTIVE     Serologies Latest Ref Rng & Units 10/18/2022 5/18/2022   Hep B Surface Ag Interp Negative    Negative   Hep B Core Ab, Total Negative    Negative   Hep B Surface Ab mIU/mL  11.40   Hep B Surface Ab Interp NONREACTIVE    REACTIVE (A)   Hep C Ab NONREACTIVE       Ferritin 26 - 388 NG/ (H)    Iron % Saturation 20 - 50 % 10 (L)    ARLEY, IFA  Negative    C-ANCA Neg:<1:20 titer <1:20    P-ANCA Neg:<1:20 titer <1:20    ANCA Neg:<1:20 titer <1:20    ASMCA 0 - 19 Units  22 (H)   M2 Ab 0.0 - 20.0 Units  <20.0   Alpha-1 antitrypsin level 101 - 187 mg/dL 272 (H)      LIVER HISTOLOGY:  10/2022. Slides reviewed by MLS.mild portal inflammation, mild PMN, no lobular inflammation, no steatosis, and stage 1 fibrosis portal that is greater than the degree of inflammation. 12/2022. FibroScan performed at [facility] [employer] of [address] [place] [address]. EkPa was 13.2. IQR/med 8%. . The results suggested a fibrosis level of F3. The CAP score suggests there is no significant hepatic steatosis. ENDOSCOPIC PROCEDURES:  Not available or performed    RADIOLOGY:  5/2022. Ultrasound of liver. Normal appearing liver. No liver mass lesions. 12/2022. MRI Abd/MRCP. Negative for PSC. Moderate gallstone, numerous tiny gallstones. OTHER TESTING:  Not available or performed    FOLLOW-UP:  All of the issues listed above in the Assessment and Plan were discussed with the patient. All questions were answered. The patient expressed a clear understanding of the above. [address] [address] in 4 weeks for routine monitoring and to assess response to prednisone & Imuran.      Catalino Padgett [facility]  [address] [facility], [address] [facility] 88. [phone] Qi Morales MD  [address]22 [facility] of [address] [address] Jad Hewitt [address] [place] [address] 22. [phone]  FAX:  [phone]

## 2023-03-21 NOTE — PROGRESS NOTES
Identified pt with two pt identifiers(name and ). Reviewed record in preparation for visit and have obtained necessary documentation. Chief Complaint   Patient presents with    Hepatitis     4 week f/u      Vitals:    23 1230   BP: 128/85   Pulse: 99   Resp: 17   Temp: 97.9 °F (36.6 °C)   TempSrc: Temporal   SpO2: 96%   Weight: 137 lb (62.1 kg)   Height: 5' 3\" (1.6 m)   PainSc:   0 - No pain       Health Maintenance Review: Patient reminded of \"due or due soon\" health maintenance. I have asked the patient to contact his/her primary care provider (PCP) for follow-up on his/her health maintenance. Coordination of Care Questionnaire:  :   1) Have you been to an emergency room, urgent care, or hospitalized since your last visit? If yes, where when, and reason for visit? no       2. Have seen or consulted any other health care provider since your last visit? If yes, where when, and reason for visit? NO      Patient is accompanied by daughter I have received verbal consent from [facility] to discuss any/all medical information while they are present in the room.

## 2023-04-03 NOTE — PROGRESS NOTES
ICD-10-CM ICD-9-CM    1. Chronic active hepatitis ([facility]  S84.7 345.85 METABOLIC PANEL, COMPREHENSIVE      CBC WITH AUTOMATED DIFF      2. Parkinson's disease ([facility]  G20 332.0       3. Transaminitis  R74.01 790.4 FERRITIN      IRON PROFILE      4. Ground-level fall  W18.30XA E888.9 XR TIB/FIB LT      XR TIB/FIB RT      5. Current chronic use of systemic steroids  Z79.52 V58.65       6. Steroid-induced hyperglycemia  R73.9 790.29 HEMOGLOBIN A1C WITH EAG    T38.0X5A E932.0       7. Encounter for immunization  Z23 V03.89 pneumococcal 20-brandy conj-dip, PF, (Prevnar 20, PF,) 0.5 mL syrg injection      DISCONTINUED: pneumococcal 20-brandy conj-dip, PF, (Prevnar 20, PF,) 0.5 mL syrg injection               Subjective:     Chief Complaint   Patient presents with    Follow-up       Mildred Mabry is a 75 y.o. F.  she  has a past medical history of Allergic rhinitis (09/20/2017), Autoimmune hepatitis ([facility] (10/2022), Diverticulosis (09/2022), Eczema (2019), History of gallstones (05/2022), Hypercholesterolemia (05/2022), Internal hemorrhoids (09/2022), Osteoarthritis, Osteopenia (09/20/2017), Parkinson's disease ([facility] (2019), Portal fibrosis without cirrhosis (10/2022), Prediabetes (05/2022), and Vitamin D deficiency. her last appointment in this clinic was 12/6/2022 . I reviewed and updated the medical record. At this patient's most recent appointment with me in early December 2022, she had recently undergone an liver biopsy and was pending follow up with her hepatologist.  It was felt that her chronic hepatitis with most likely secondary to autoimmune hepatitis. She was noted to be following regularly with neurology for her history of Parkinson's disease, and had been noted to be following up with her neurologist in the coming weeks. Physical examination  at the time had been generally unremarkableother than a baseline PD tremor.   Over the subsequent weeks, the patient was seen mainly by hepatology for her autoimmune hepatitis. At her most recent visit with them in late March, repeat FibroScan was ordered. She was previously started on Imuran 50 mg daily in February and was felt to be tolerating this well; CellCept was avoided secondary to potential interaction with Sinemet. Today, the patient comes in for routine follow-up. She reports feeling generally well overall today but notes that she had fallen about 1 week ago while at home. She had gotten out of bed to go to the bathroom, when she had put her hand out on a bookshelf to help maintain her balance; she had slipped and had landed on her shins, as well as her knees, and her left ribs. She initially had some left-sided rib pain, but this has since gotten better, and she denies having had any ongoing chest pain, shortness of breath, or any further cardiopulmonary concerns. She did notice some bruising on her knees initially, but says that her knees have been feeling better, and she has been able to walk without too much problem. However, she has had some bilateral shin pain for, where she had landed on her shins. She wonders if x-rays would be reasonable. She denies having had any difficulty with ambulation otherwise, and has not lost her balance since that time. There is no safety equipment in the house, but she is amenable to considering adding on grab bars and guard rails to the home in the future. She continues on a tapering dose of prednisone as prescribed by her gastroenterology team.  She is currently taking 15 mg daily with the plan per gastroenterology apparently to continue decreasing this over time over the next several months. Since being on the medication, there has been worsening lower extremity edema, as well as facial edema and flushing, and her laboratory studies have demonstrated hyperglycemia. She does not check her blood pressure readings regularly at home. She is not currently taking medications for blood pressure.   She continues on Imuran otherwise without any other issues noted. She notes that gastroenterology had requested repeat laboratory studies. She continues on Sinemet at the direction of neurology. She has a baseline tremor, worse on the left side than the right. Other than the fall, she has had no other recent balance issues. Her energy level has been stable. Her review of systems is otherwise negative. Routine Healthcare Maintenance issues are reviewed and discussed with the patient as noted below. Orders to update gaps in healthcare maintenance were placed as noted below in the Assessment and Plan, where applicable.      Past Medical History:  Past Medical History:   Diagnosis Date    Allergic rhinitis 2017    Autoimmune hepatitis ([facility] 10/2022    10/22 - Biopsy proven -->  - Imuran, Prednisone; follows with Dr. Hipolito Fish    Diverticulosis 2022    CSP finding    Eczema     Prev numerous dermatology referrals; currently under care with homeopathy    History of gallstones 2022    Hypercholesterolemia 2022    Internal hemorrhoids 2022    CSP finding    Osteoarthritis     Osteopenia 2017    Parkinson's disease ([facility] 2019    L handed tremor; no Rx as of ; Neurology Following    Portal fibrosis without cirrhosis 10/2022    s/p Liver bx    Prediabetes 2022    A1C  = 6%    Vitamin D deficiency        Past Surgical Histor:  Past Surgical History:   Procedure Laterality Date    COLONOSCOPY N/A 2022    COLONOSCOPY performed by Jacobo John MD at [facility] ENDOSCOPY    HX  SECTION      x 1    HX HEENT      deviated septum repair    HX HYSTERECTOMY      HX OTHER SURGICAL      colonoscopy    HX TONSILLECTOMY      HX UROLOGICAL      bladder sling       Allergies:  No Known Allergies    Medications:  Current Outpatient Medications   Medication Sig Dispense Refill    pneumococcal 20-brandy conj-dip, PF, (Prevnar 20, PF,) 0.5 mL syrg injection 0.5 mL by IntraMUSCular route PRIOR TO DISCHARGE for 1 dose. 1 Each 0    predniSONE (DELTASONE) 5 mg tablet Take 3 Tablets by mouth daily. Indications: liver inflammation resulting from an abnormal immune response 90 Tablet 3    multivit-min/iron/folic/lutein (CENTRUM SILVER WOMEN PO) Take  by mouth daily. azaTHIOprine (IMURAN) 50 mg tablet Take 1 Tablet by mouth daily. Indications: liver inflammation resulting from an abnormal immune response 90 Tablet 3    carbidopa-levodopa (SINEMET)  mg per tablet Take 1 Tablet by mouth three (3) times daily. calcium-cholecalciferol, D3, (CALTRATE 600+D) tablet Take 1 Tablet by mouth two (2) times a day. ascorbic acid, vitamin C, (VITAMIN C) 1,000 mg tablet Take 1 Tablet by mouth two (2) times a day. ELDERBERRY FRUIT PO Take  by mouth two (2) times a day.          Social History:  Social History     Socioeconomic History    Marital status:    Tobacco Use    Smoking status: Never    Smokeless tobacco: Never   Vaping Use    Vaping Use: Never used   Substance and Sexual Activity    Alcohol use: No    Drug use: No    Sexual activity: Not Currently     Partners: Male       Family History:  Family History   Problem Relation Age of Onset    Elevated Lipids Mother     Cancer Mother         breast    Hypertension Father     Diabetes Father     Macular Degen Father     Heart Disease Father        Immunizations:  Immunization History   Administered Date(s) Administered    Influenza Vaccine 10/26/2018    Influenza, FLUARIX, FLULAVAL, Sandra Arabia (age 10 mo+) AND AFLURIA, (age 1 y+), PF, 0.5mL 10/18/2016, 10/20/2017, 11/18/2019, 11/23/2020        Healthcare Maintenance:  Health Maintenance   Topic Date Due    COVID-19 Vaccine (1) Never done    Pneumococcal 65+ years (1 - PCV) Never done    Hepatitis B Vaccine (1 of 3 - Risk 3-dose series) Never done    Shingles Vaccine (1 of 2) Never done    DTaP/Tdap/Td series (1 - Tdap) Never done    Medicare Yearly Exam  05/11/2023    Flu Vaccine (Season Ended) 08/01/2023    A1C test (Diabetic or Prediabetic)  10/06/2023    Depression Screen  03/21/2024    Breast Cancer Screen Mammogram  11/30/2024    Lipid Screen  05/10/2027    Colorectal Cancer Screening Combo  08/19/2032    Hepatitis C Screening  Completed    Bone Densitometry (Dexa) Screening  Completed        Review of Systems:  ROS:  Review of Systems   Constitutional: Negative. HENT: Negative. Eyes: Negative. Respiratory: Negative. Cardiovascular:  Positive for leg swelling (trace). Gastrointestinal: Negative. Genitourinary: Negative. Musculoskeletal:  Positive for falls. Skin: Negative. Neurological: Negative. Endo/Heme/Allergies: Negative. Psychiatric/Behavioral: Negative. ROS otherwise negative      Objective:     Vital Signs:  Visit Vitals  BP (!) 150/84 (BP 1 Location: Right arm, BP Patient Position: Sitting, BP Cuff Size: Adult)   Pulse (!) 111   Resp 17   Ht 5' 3\" (1.6 m)   Wt 138 lb 3.2 oz (62.7 kg)   SpO2 99%   BMI 24.48 kg/m²       BMI:  Body mass index is 24.48 kg/m². Physical Examination:  Physical Exam  Constitutional:       Appearance: Normal appearance. She is normal weight. HENT:      Head: Normocephalic and atraumatic. Right Ear: External ear normal.      Left Ear: External ear normal.      Nose: Nose normal.      Mouth/Throat:      Mouth: Mucous membranes are moist.      Pharynx: Oropharynx is clear. No oropharyngeal exudate or posterior oropharyngeal erythema. Cardiovascular:      Rate and Rhythm: Normal rate and regular rhythm. Pulses: Normal pulses. Heart sounds: Normal heart sounds. No murmur heard. No friction rub. No gallop. Pulmonary:      Effort: Pulmonary effort is normal. No respiratory distress. Breath sounds: Normal breath sounds. No wheezing, rhonchi or rales. Abdominal:      General: Abdomen is flat. Bowel sounds are normal. There is no distension. Palpations: Abdomen is soft. Tenderness: There is no abdominal tenderness. There is no guarding. Musculoskeletal:         General: No swelling, tenderness or deformity. Normal range of motion. Cervical back: Normal range of motion and neck supple. No rigidity or tenderness. Right lower leg: Edema (trace) present. Left lower leg: Edema (trace) present. Skin:     General: Skin is warm and dry. Findings: No erythema, lesion or rash. Neurological:      General: No focal deficit present. Mental Status: She is alert and oriented to person, place, and time. Mental status is at baseline. Sensory: No sensory deficit. Motor: No weakness. Gait: Gait normal.      Deep Tendon Reflexes: Reflexes normal.   Psychiatric:         Mood and Affect: Mood normal.         Behavior: Behavior normal.         Judgment: Judgment normal.        Physical exam otherwise negative    Diagnostic Testing:    Laboratory Studies:  Orders Only on 03/09/2023   Component Date Value Ref Range Status    WBC 03/09/2023 10.9 (H)  3.4 - 10.8 x10E3/uL Final    RBC 03/09/2023 4.46  3.77 - 5.28 x10E6/uL Final    HGB 03/09/2023 14.2  11.1 - 15.9 g/dL Final    HCT 03/09/2023 40.5  34.0 - 46.6 % Final    MCV 03/09/2023 91  79 - 97 fL Final    MCH 03/09/2023 31.8  26.6 - 33.0 pg Final    MCHC 03/09/2023 35.1  31.5 - 35.7 g/dL Final    RDW 03/09/2023 12.7  11.7 - 15.4 % Final    PLATELET 85/70/2718 917  150 - 450 x10E3/uL Final    NEUTROPHILS 03/09/2023 89  Not Estab. % Final    Lymphocytes 03/09/2023 6  Not Estab. % Final    MONOCYTES 03/09/2023 2  Not Estab. % Final    EOSINOPHILS 03/09/2023 0  Not Estab. % Final    BASOPHILS 03/09/2023 1  Not Estab. % Final    ABS. NEUTROPHILS 03/09/2023 9.7 (H)  1.4 - 7.0 x10E3/uL Final    Abs Lymphocytes 03/09/2023 0.7  0.7 - 3.1 x10E3/uL Final    ABS. MONOCYTES 03/09/2023 0.2  0.1 - 0.9 x10E3/uL Final    ABS. EOSINOPHILS 03/09/2023 0.0  0.0 - 0.4 x10E3/uL Final    ABS.  BASOPHILS 03/09/2023 0.1  0.0 - 0.2 x10E3/uL Final    IMMATURE GRANULOCYTES 03/09/2023 2  Not Estab. % Final    ABS. IMM. GRANS. 03/09/2023 0.2 (H)  0.0 - 0.1 x10E3/uL Final    Comment: (An elevated percentage of Immature Granulocytes has not been found  to be clinically significant as a sole clinical predictor of disease. Does NOT include bands or blast cells. Pregnancy associated  physiological leukocytosis may also show increased immature  granulocytes without clinical significance.)      Glucose 03/09/2023 207 (H)  70 - 99 mg/dL Final    BUN 03/09/2023 17  8 - 27 mg/dL Final    Creatinine 03/09/2023 0.84  0.57 - 1.00 mg/dL Final    eGFR 03/09/2023 76  >59 mL/min/1.73 Final    BUN/Creatinine ratio 03/09/2023 20  12 - 28 Final    Sodium 03/09/2023 136  134 - 144 mmol/L Final    Potassium 03/09/2023 4.5  3.5 - 5.2 mmol/L Final    Chloride 03/09/2023 97  96 - 106 mmol/L Final    CO2 03/09/2023 22  20 - 29 mmol/L Final    Calcium 03/09/2023 9.7  8.7 - 10.3 mg/dL Final    Protein, total 03/09/2023 6.9  6.0 - 8.5 g/dL Final    Albumin 03/09/2023 4.4  3.8 - 4.8 g/dL Final    Bilirubin, total 03/09/2023 0.4  0.0 - 1.2 mg/dL Final    Bilirubin, direct 03/09/2023 0.15  0.00 - 0.40 mg/dL Final    Alk.  phosphatase 03/09/2023 100  44 - 121 IU/L Final    AST (SGOT) 03/09/2023 20  0 - 40 IU/L Final    ALT (SGPT) 03/09/2023 16  0 - 32 IU/L Final   Orders Only on 02/06/2023   Component Date Value Ref Range Status    WBC 02/06/2023 10.4  3.4 - 10.8 x10E3/uL Final    RBC 02/06/2023 4.54  3.77 - 5.28 x10E6/uL Final    HGB 02/06/2023 13.9  11.1 - 15.9 g/dL Final    HCT 02/06/2023 40.5  34.0 - 46.6 % Final    MCV 02/06/2023 89  79 - 97 fL Final    MCH 02/06/2023 30.6  26.6 - 33.0 pg Final    MCHC 02/06/2023 34.3  31.5 - 35.7 g/dL Final    RDW 02/06/2023 12.8  11.7 - 15.4 % Final    PLATELET 37/02/7434 465  150 - 450 x10E3/uL Final    NEUTROPHILS 02/06/2023 93  Not Estab. % Final    Lymphocytes 02/06/2023 5  Not Estab. % Final    MONOCYTES 02/06/2023 1  Not Estab. % Final    EOSINOPHILS 02/06/2023 0  Not Estab. % Final    BASOPHILS 02/06/2023 0  Not Estab. % Final    ABS. NEUTROPHILS 02/06/2023 9.7 (H)  1.4 - 7.0 x10E3/uL Final    Abs Lymphocytes 02/06/2023 0.5 (L)  0.7 - 3.1 x10E3/uL Final    ABS. MONOCYTES 02/06/2023 0.1  0.1 - 0.9 x10E3/uL Final    ABS. EOSINOPHILS 02/06/2023 0.0  0.0 - 0.4 x10E3/uL Final    ABS. BASOPHILS 02/06/2023 0.0  0.0 - 0.2 x10E3/uL Final    IMMATURE GRANULOCYTES 02/06/2023 1  Not Estab. % Final    ABS. IMM. GRANS. 02/06/2023 0.1  0.0 - 0.1 x10E3/uL Final    Glucose 02/06/2023 264 (H)  70 - 99 mg/dL Final    BUN 02/06/2023 15  8 - 27 mg/dL Final    Creatinine 02/06/2023 0.72  0.57 - 1.00 mg/dL Final    eGFR 02/06/2023 91  >59 mL/min/1.73 Final    BUN/Creatinine ratio 02/06/2023 21  12 - 28 Final    Sodium 02/06/2023 138  134 - 144 mmol/L Final    Potassium 02/06/2023 4.1  3.5 - 5.2 mmol/L Final    Chloride 02/06/2023 97  96 - 106 mmol/L Final    CO2 02/06/2023 24  20 - 29 mmol/L Final    Calcium 02/06/2023 10.0  8.7 - 10.3 mg/dL Final    Protein, total 02/06/2023 6.5  6.0 - 8.5 g/dL Final    Albumin 02/06/2023 4.3  3.8 - 4.8 g/dL Final    Bilirubin, total 02/06/2023 0.4  0.0 - 1.2 mg/dL Final    Bilirubin, direct 02/06/2023 0.16  0.00 - 0.40 mg/dL Final    Alk.  phosphatase 02/06/2023 112  44 - 121 IU/L Final    AST (SGOT) 02/06/2023 17  0 - 40 IU/L Final    ALT (SGPT) 02/06/2023 9  0 - 32 IU/L Final   Office Visit on 01/13/2023   Component Date Value Ref Range Status    WBC 01/13/2023 8.7  3.6 - 11.0 K/uL Final    RBC 01/13/2023 4.61  3.80 - 5.20 M/uL Final    HGB 01/13/2023 13.7  11.5 - 16.0 g/dL Final    HCT 01/13/2023 40.7  35.0 - 47.0 % Final    MCV 01/13/2023 88.3  80.0 - 99.0 FL Final    MCH 01/13/2023 29.7  26.0 - 34.0 PG Final    MCHC 01/13/2023 33.7  30.0 - 36.5 g/dL Final    RDW 01/13/2023 12.9  11.5 - 14.5 % Final    PLATELET 23/34/2920 539 (L)  150 - 400 K/uL Final    MPV 01/13/2023 11.3  8.9 - 12.9 FL Final    NRBC 01/13/2023 0.0  0 PER 100 WBC Final    ABSOLUTE NRBC 01/13/2023 0.00  0.00 - 0.01 K/uL Final    NEUTROPHILS 01/13/2023 86 (H)  32 - 75 % Final    LYMPHOCYTES 01/13/2023 8 (L)  12 - 49 % Final    MONOCYTES 01/13/2023 3 (L)  5 - 13 % Final    EOSINOPHILS 01/13/2023 1  0 - 7 % Final    BASOPHILS 01/13/2023 1  0 - 1 % Final    IMMATURE GRANULOCYTES 01/13/2023 1 (H)  0.0 - 0.5 % Final    ABS. NEUTROPHILS 01/13/2023 7.4  1.8 - 8.0 K/UL Final    ABS. LYMPHOCYTES 01/13/2023 0.7 (L)  0.8 - 3.5 K/UL Final    ABS. MONOCYTES 01/13/2023 0.3  0.0 - 1.0 K/UL Final    ABS. EOSINOPHILS 01/13/2023 0.1  0.0 - 0.4 K/UL Final    ABS. BASOPHILS 01/13/2023 0.1  0.0 - 0.1 K/UL Final    ABS. IMM. GRANS. 01/13/2023 0.1 (H)  0.00 - 0.04 K/UL Final    DF 01/13/2023 SMEAR SCANNED    Final    RBC COMMENTS 01/13/2023 NORMOCYTIC, NORMOCHROMIC    Final    Sodium 01/13/2023 135 (L)  136 - 145 mmol/L Final    Potassium 01/13/2023 4.2  3.5 - 5.1 mmol/L Final    Chloride 01/13/2023 99  97 - 108 mmol/L Final    CO2 01/13/2023 27  21 - 32 mmol/L Final    Anion gap 01/13/2023 9  5 - 15 mmol/L Final    Glucose 01/13/2023 193 (H)  65 - 100 mg/dL Final    BUN 01/13/2023 14  6 - 20 MG/DL Final    Creatinine 01/13/2023 0.79  0.55 - 1.02 MG/DL Final    BUN/Creatinine ratio 01/13/2023 18  12 - 20   Final    eGFR 01/13/2023 >60  >60 ml/min/1.73m2 Final    Comment:   Pediatric calculator link: [email or web address]_[email or web address]    These results are not intended for use in patients <25years of age. eGFR results are calculated without a race factor using  the 2021 CKD-EPI equation. Careful clinical correlation is recommended, particularly when comparing to results calculated using previous equations. The CKD-EPI equation is less accurate in patients with extremes of muscle mass, extra-renal metabolism of creatinine, excessive creatine ingestion, or following therapy that affects renal tubular secretion.       Calcium 01/13/2023 10.0  8.5 - 10.1 MG/DL Final Protein, total 01/13/2023 7.2  6.4 - 8.2 g/dL Final    Albumin 01/13/2023 3.7  3.5 - 5.0 g/dL Final    Globulin 01/13/2023 3.5  2.0 - 4.0 g/dL Final    A-G Ratio 01/13/2023 1.1  1.1 - 2.2   Final    Bilirubin, total 01/13/2023 0.6  0.2 - 1.0 MG/DL Final    Bilirubin, direct 01/13/2023 0.2  0.0 - 0.2 MG/DL Final    Alk. phosphatase 01/13/2023 123 (H)  45 - 117 U/L Final    AST (SGOT) 01/13/2023 22  15 - 37 U/L Final    ALT (SGPT) 01/13/2023 11 (L)  12 - 78 U/L Final   Office Visit on 12/09/2022   Component Date Value Ref Range Status    WBC 12/09/2022 5.5  3.6 - 11.0 K/uL Final    RBC 12/09/2022 4.66  3.80 - 5.20 M/uL Final    HGB 12/09/2022 14.2  11.5 - 16.0 g/dL Final    HCT 12/09/2022 42.2  35.0 - 47.0 % Final    MCV 12/09/2022 90.6  80.0 - 99.0 FL Final    MCH 12/09/2022 30.5  26.0 - 34.0 PG Final    MCHC 12/09/2022 33.6  30.0 - 36.5 g/dL Final    RDW 12/09/2022 13.3  11.5 - 14.5 % Final    PLATELET 92/09/7708 349  150 - 400 K/uL Final    MPV 12/09/2022 10.8  8.9 - 12.9 FL Final    NRBC 12/09/2022 0.0  0  WBC Final    ABSOLUTE NRBC 12/09/2022 0.00  0.00 - 0.01 K/uL Final    NEUTROPHILS 12/09/2022 67  32 - 75 % Final    LYMPHOCYTES 12/09/2022 22  12 - 49 % Final    MONOCYTES 12/09/2022 8  5 - 13 % Final    EOSINOPHILS 12/09/2022 2  0 - 7 % Final    BASOPHILS 12/09/2022 1  0 - 1 % Final    IMMATURE GRANULOCYTES 12/09/2022 0  0.0 - 0.5 % Final    ABS. NEUTROPHILS 12/09/2022 3.7  1.8 - 8.0 K/UL Final    ABS. LYMPHOCYTES 12/09/2022 1.2  0.8 - 3.5 K/UL Final    ABS. MONOCYTES 12/09/2022 0.5  0.0 - 1.0 K/UL Final    ABS. EOSINOPHILS 12/09/2022 0.1  0.0 - 0.4 K/UL Final    ABS. BASOPHILS 12/09/2022 0.1  0.0 - 0.1 K/UL Final    ABS. IMM. GRANS.  12/09/2022 0.0  0.00 - 0.04 K/UL Final    DF 12/09/2022 AUTOMATED    Final    Sodium 12/09/2022 140  136 - 145 mmol/L Final    Potassium 12/09/2022 4.1  3.5 - 5.1 mmol/L Final    Chloride 12/09/2022 105  97 - 108 mmol/L Final    CO2 12/09/2022 30  21 - 32 mmol/L Final Anion gap 12/09/2022 5  5 - 15 mmol/L Final    Glucose 12/09/2022 120 (H)  65 - 100 mg/dL Final    BUN 12/09/2022 13  6 - 20 MG/DL Final    Creatinine 12/09/2022 0.67  0.55 - 1.02 MG/DL Final    BUN/Creatinine ratio 12/09/2022 19  12 - 20   Final    eGFR 12/09/2022 >60  >60 ml/min/1.73m2 Final    Comment:   Pediatric calculator link: [email or web address]/gfr_[email or web address]    These results are not intended for use in patients <25years of age. eGFR results are calculated without a race factor using  the 2021 CKD-EPI equation. Careful clinical correlation is recommended, particularly when comparing to results calculated using previous equations. The CKD-EPI equation is less accurate in patients with extremes of muscle mass, extra-renal metabolism of creatinine, excessive creatine ingestion, or following therapy that affects renal tubular secretion. Calcium 12/09/2022 9.4  8.5 - 10.1 MG/DL Final    Protein, total 12/09/2022 7.8  6.4 - 8.2 g/dL Final    Albumin 12/09/2022 3.8  3.5 - 5.0 g/dL Final    Globulin 12/09/2022 4.0  2.0 - 4.0 g/dL Final    A-G Ratio 12/09/2022 1.0 (L)  1.1 - 2.2   Final    Bilirubin, total 12/09/2022 1.4 (H)  0.2 - 1.0 MG/DL Final    Bilirubin, direct 12/09/2022 0.8 (H)  0.0 - 0.2 MG/DL Final    Alk. phosphatase 12/09/2022 208 (H)  45 - 117 U/L Final    AST (SGOT) 12/09/2022 288 (H)  15 - 37 U/L Final    ALT (SGPT) 12/09/2022 356 (H)  12 - 78 U/L Final   Office Visit on 10/18/2022   Component Date Value Ref Range Status    Cytoplasmic (C-ANCA) Ab 10/18/2022 <1:20  Neg:<1:20 titer Final    Perinuclear (P-ANCA) 10/18/2022 <1:20  Neg:<1:20 titer Final    Comment: (NOTE)  The presence of positive fluorescence exhibiting P-ANCA or C-ANCA  patterns alone is not specific for the diagnosis of Wegener's  Granulomatosis (WG) or microscopic polyangiitis. Decisions about  treatment should not be based solely on ANCA IFA results.   The  International ANCA Group Consensus recommends follow up testing of  positive sera with both NE-3 and MPO-ANCA enzyme immunoassays. As  many as 5% serum samples are positive only by EIA. Ref. AM J Clin Pathol 1999;111:507-513. Atypical pANCA 10/18/2022 <1:20  Neg:<1:20 titer Final    Comment: (NOTE)  The atypical pANCA pattern has been observed in a significant  percentage of patients with ulcerative colitis, primary sclerosing  cholangitis and autoimmune hepatitis. Performed At: [facility] & [address] [id number]  Radha Swan MD [id number]      Antinuclear Abs, IFA 10/18/2022 Negative    Final    Comment: (NOTE)                                      Negative   <1:80                                      Borderline  1:80                                      Positive   >1:80  ICAP nomenclature: AC-0  For more information about Hep-2 cell patterns use  ANApatterns. org, the official website for the International  Consensus on Antinuclear Antibody (ARLEY) Patterns (ICAP). Performed At: [address] [id number]  Radha Swan MD [id number]      Alpha-1 Antitrypsin 10/18/2022 272 (H)  101 - 187 mg/dL Final    Comment: (NOTE)  Performed At: [facility] & [address] [id number]  Radha Swan MD [id number]      Iron 10/18/2022 33 (L)  35 - 150 ug/dL Final    TIBC 10/18/2022 316  250 - 450 ug/dL Final    Iron % saturation 10/18/2022 10 (L)  20 - 50 % Final    Ferritin 10/18/2022 436 (H)  26 - 388 NG/ML Final    INR 10/18/2022 1.0  0.9 - 1.1   Final    A single therapeutic range for Vit K antagonists may not be optimal for all indications - see June, 2008 issue of Chest, American College of Chest Physicians Evidence-Based Clinical Practice Guidelines, 8th Edition.     Prothrombin time 10/18/2022 10.9  9.0 - 11.1 sec Final    WBC 10/18/2022 12.7 (H)  3.6 - 11.0 K/uL Final    RBC 10/18/2022 4.51  3.80 - 5.20 M/uL Final    HGB 10/18/2022 13.4  11.5 - 16.0 g/dL Final    HCT 10/18/2022 40.5  35.0 - 47.0 % Final    MCV 10/18/2022 89.8  80.0 - 99.0 FL Final    MCH 10/18/2022 29.7  26.0 - 34.0 PG Final    MCHC 10/18/2022 33.1  30.0 - 36.5 g/dL Final    RDW 10/18/2022 13.2  11.5 - 14.5 % Final    PLATELET 01/16/9831 444  150 - 400 K/uL Final    MPV 10/18/2022 11.6  8.9 - 12.9 FL Final    NRBC 10/18/2022 0.0  0  WBC Final    ABSOLUTE NRBC 10/18/2022 0.00  0.00 - 0.01 K/uL Final    NEUTROPHILS 10/18/2022 79 (H)  32 - 75 % Final    LYMPHOCYTES 10/18/2022 11 (L)  12 - 49 % Final    MONOCYTES 10/18/2022 8  5 - 13 % Final    EOSINOPHILS 10/18/2022 0  0 - 7 % Final    BASOPHILS 10/18/2022 1  0 - 1 % Final    IMMATURE GRANULOCYTES 10/18/2022 1 (H)  0.0 - 0.5 % Final    ABS. NEUTROPHILS 10/18/2022 10.1 (H)  1.8 - 8.0 K/UL Final    ABS. LYMPHOCYTES 10/18/2022 1.4  0.8 - 3.5 K/UL Final    ABS. MONOCYTES 10/18/2022 1.0  0.0 - 1.0 K/UL Final    ABS. EOSINOPHILS 10/18/2022 0.1  0.0 - 0.4 K/UL Final    ABS. BASOPHILS 10/18/2022 0.1  0.0 - 0.1 K/UL Final    ABS. IMM. GRANS. 10/18/2022 0.1 (H)  0.00 - 0.04 K/UL Final    DF 10/18/2022 AUTOMATED    Final    Sodium 10/18/2022 135 (L)  136 - 145 mmol/L Final    Potassium 10/18/2022 4.2  3.5 - 5.1 mmol/L Final    Chloride 10/18/2022 102  97 - 108 mmol/L Final    CO2 10/18/2022 25  21 - 32 mmol/L Final    Anion gap 10/18/2022 8  5 - 15 mmol/L Final    Glucose 10/18/2022 132 (H)  65 - 100 mg/dL Final    BUN 10/18/2022 11  6 - 20 MG/DL Final    Creatinine 10/18/2022 0.63  0.55 - 1.02 MG/DL Final    BUN/Creatinine ratio 10/18/2022 17  12 - 20   Final    eGFR 10/18/2022 >60  >60 ml/min/1.73m2 Final    Comment:   Pediatric calculator link: [email or web address]    Effective Oct 3, 2022    These results are not intended for use in patients <25years of age. eGFR results are calculated without a race factor using  the 2021 CKD-EPI equation.  Careful clinical correlation is recommended, particularly when comparing to results calculated using previous equations. The CKD-EPI equation is less accurate in patients with extremes of muscle mass, extra-renal metabolism of creatinine, excessive creatine ingestion, or following therapy that affects renal tubular secretion. Calcium 10/18/2022 9.1  8.5 - 10.1 MG/DL Final    Protein, total 10/18/2022 7.4  6.4 - 8.2 g/dL Final    Albumin 10/18/2022 3.3 (L)  3.5 - 5.0 g/dL Final    Globulin 10/18/2022 4.1 (H)  2.0 - 4.0 g/dL Final    A-G Ratio 10/18/2022 0.8 (L)  1.1 - 2.2   Final    Bilirubin, total 10/18/2022 1.1 (H)  0.2 - 1.0 MG/DL Final    Bilirubin, direct 10/18/2022 0.6 (H)  0.0 - 0.2 MG/DL Final    Alk. phosphatase 10/18/2022 173 (H)  45 - 117 U/L Final    AST (SGOT) 10/18/2022 85 (H)  15 - 37 U/L Final    ALT (SGPT) 10/18/2022 157 (H)  12 - 78 U/L Final   Office Visit on 10/06/2022   Component Date Value Ref Range Status    Special Requests: 10/06/2022 NO SPECIAL REQUESTS    Final    Culture result: 10/06/2022 No significant growth, <10,000 CFU/mL    Final    Color 10/06/2022 DARK YELLOW    Final    Color Reference Range: Straw, Yellow or Dark Yellow    Appearance 10/06/2022 CLEAR  CLEAR   Final    Specific gravity 10/06/2022 1.020  1.003 - 1.030   Final    pH (UA) 10/06/2022 6.0  5.0 - 8.0   Final    Protein 10/06/2022 Negative  Negative mg/dL Final    Glucose 10/06/2022 Negative  Negative mg/dL Final    Ketone 10/06/2022 15 (A)  Negative mg/dL Final    Bilirubin 10/06/2022 Negative  Negative   Final    Blood 10/06/2022 Negative  Negative   Final    Urobilinogen 10/06/2022 1.0  0.2 - 1.0 EU/dL Final    Nitrites 10/06/2022 Negative  Negative   Final    Leukocyte Esterase 10/06/2022 MODERATE (A)  Negative   Final    WBC 10/06/2022 0-4  0 - 4 /hpf Final    RBC 10/06/2022 0-5  0 - 5 /hpf Final    Epithelial cells 10/06/2022 FEW  FEW /lpf Final    Epithelial cell category consists of squamous cells and /or transitional urothelial cells.  Renal tubular cells, if present, are separately identified as such. Bacteria 10/06/2022 Negative  Negative /hpf Final    Hyaline cast 10/06/2022 2-5  0 - 5 /lpf Final    Rheumatoid factor 10/06/2022 10.5  <14.0 IU/mL Final    Comment: (NOTE)  Performed At: [facility] & [address] [id number]  Steven Dougherty MD :[id number]      CCP Antibodies IgG/IgA 10/06/2022 14  0 - 19 units Final    Comment: (NOTE)                           Negative               <20                           Weak positive      20 - 39                           Moderate positive  40 - 59                           Strong positive        >59  Performed At: [facility] & [facility] [address], [place] [id number]  Steven Dougherty MD :[id number]      Hemoglobin A1c 10/06/2022 5.9 (H)  4.0 - 5.6 % Final    Comment: NEW METHOD  PLEASE NOTE NEW REFERENCE RANGE  (NOTE)  HbA1C Interpretive Ranges  <5.7              Normal  5.7 - 6.4         Consider Prediabetes  >6.5              Consider Diabetes      Est. average glucose 10/06/2022 123  mg/dL Final    C-Reactive protein 10/06/2022 6.59 (H)  0.00 - 0.60 mg/dL Final    Comment: CRP is a nonspecific acute phase reactant that shows rapid, marked increases with inflammation, infection, trauma, tissue necrosis, malignancies and autoimmune diseases. Sequential CRP levels are useful in monitoring response to antibacterial therapy. This assay is not equivalent to the hsCRP test since the presence of one or more of the foregoing disease processes obviates the risk stratification information available from hsCRP testing. Sed rate, automated 10/06/2022 13  0 - 30 mm/hr Final    Uric acid 10/06/2022 4.4  2.6 - 6.0 MG/DL Final    INR 10/06/2022 1.2 (H)  0.9 - 1.1   Final    A single therapeutic range for Vit K antagonists may not be optimal for all indications - see June, 2008 issue of Chest, American College of Chest Physicians Evidence-Based Clinical Practice Guidelines, 8th Edition. Prothrombin time 10/06/2022 12.2 (H)  9.0 - 11.1 sec Final    WBC 10/06/2022 15.9 (H)  3.6 - 11.0 K/uL Final    RBC 10/06/2022 4.60  3.80 - 5.20 M/uL Final    HGB 10/06/2022 13.6  11.5 - 16.0 g/dL Final    HCT 10/06/2022 40.7  35.0 - 47.0 % Final    MCV 10/06/2022 88.5  80.0 - 99.0 FL Final    MCH 10/06/2022 29.6  26.0 - 34.0 PG Final    MCHC 10/06/2022 33.4  30.0 - 36.5 g/dL Final    RDW 10/06/2022 13.7  11.5 - 14.5 % Final    PLATELET 77/17/4583 833  150 - 400 K/uL Final    MPV 10/06/2022 11.2  8.9 - 12.9 FL Final    NRBC 10/06/2022 0.0  0  WBC Final    ABSOLUTE NRBC 10/06/2022 0.00  0.00 - 0.01 K/uL Final    NEUTROPHILS 10/06/2022 87 (H)  32 - 75 % Final    LYMPHOCYTES 10/06/2022 6 (L)  12 - 49 % Final    MONOCYTES 10/06/2022 7  5 - 13 % Final    EOSINOPHILS 10/06/2022 0  0 - 7 % Final    BASOPHILS 10/06/2022 0  0 - 1 % Final    IMMATURE GRANULOCYTES 10/06/2022 0  0.0 - 0.5 % Final    ABS. NEUTROPHILS 10/06/2022 13.7 (H)  1.8 - 8.0 K/UL Final    ABS. LYMPHOCYTES 10/06/2022 1.0  0.8 - 3.5 K/UL Final    ABS. MONOCYTES 10/06/2022 1.2 (H)  0.0 - 1.0 K/UL Final    ABS. EOSINOPHILS 10/06/2022 0.0  0.0 - 0.4 K/UL Final    ABS. BASOPHILS 10/06/2022 0.0  0.0 - 0.1 K/UL Final    ABS. IMM. GRANS. 10/06/2022 0.1 (H)  0.00 - 0.04 K/UL Final    DF 10/06/2022 AUTOMATED    Final    Sodium 10/06/2022 134 (L)  136 - 145 mmol/L Final    Potassium 10/06/2022 4.1  3.5 - 5.1 mmol/L Final    Chloride 10/06/2022 102  97 - 108 mmol/L Final    CO2 10/06/2022 25  21 - 32 mmol/L Final    Anion gap 10/06/2022 7  5 - 15 mmol/L Final    Glucose 10/06/2022 236 (H)  65 - 100 mg/dL Final    BUN 10/06/2022 13  6 - 20 MG/DL Final    Creatinine 10/06/2022 0.75  0.55 - 1.02 MG/DL Final    BUN/Creatinine ratio 10/06/2022 17  12 - 20   Final    eGFR 10/06/2022 >60  >60 ml/min/1.73m2 Final    Comment:   Pediatric calculator link: [email or web address]    Effective Oct 3, 2022    These results are not intended for use in patients <25years of age. eGFR results are calculated without a race factor using  the 2021 CKD-EPI equation. Careful clinical correlation is recommended, particularly when comparing to results calculated using previous equations. The CKD-EPI equation is less accurate in patients with extremes of muscle mass, extra-renal metabolism of creatinine, excessive creatine ingestion, or following therapy that affects renal tubular secretion. Calcium 10/06/2022 9.2  8.5 - 10.1 MG/DL Final    Bilirubin, total 10/06/2022 1.7 (H)  0.2 - 1.0 MG/DL Final    ALT (SGPT) 10/06/2022 574 (H)  12 - 78 U/L Final    AST (SGOT) 10/06/2022 397 (H)  15 - 37 U/L Final    Alk. phosphatase 10/06/2022 210 (H)  45 - 117 U/L Final    Protein, total 10/06/2022 7.6  6.4 - 8.2 g/dL Final    Albumin 10/06/2022 3.6  3.5 - 5.0 g/dL Final    Globulin 10/06/2022 4.0  2.0 - 4.0 g/dL Final    A-G Ratio 10/06/2022 0.9 (L)  1.1 - 2.2   Final    SAMPLES BEING HELD 10/06/2022 1SST   Final    COMMENT 10/06/2022 Add-on orders for these samples will be processed based on acceptable specimen integrity and analyte stability, which may vary by analyte. Final         Radiographic Studies:  XR Results (most recent):  Results from Hospital Encounter encounter on 10/06/22    XR HAND RT MIN 3 V    Narrative  PROCEDURE: XR HAND RT MIN 3 V    COMPARISON: No comparisons    REASON FOR STUDY: Right hand pain    FINDINGS: 3 views of the right hand demonstrate no evidence of acute fracture. Severe osteoarthritis of the first [facility] joint is noted with degenerative changes  in the radiocarpal joint. Ultimately focal osteoarthritis is present. 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 MELIDA Results (most recent):  Results from Abstract encounter on 03/01/23    MELIDA 3D PATSY W MAMMO BI SCREENING INCL CAD     CT Results (most recent):  No results found for this or any previous visit.      DEXA Results (most recent):  No results found for this or any previous visit. MRI Results (most recent):  [facility]    MRI ABD W MRCP W WO CONT    Narrative  MRI ABDOMEN AND MRCP WITH AND WITHOUT CONTRAST. 12/1/2022 12:29 PM    INDICATION: Elevated liver enzymes, primary sclerosing cholangitis. COMPARISON: Ultrasound 5/20/2022. TECHNIQUE: Multisequence and multiplanar MRI of the abdomen was performed after  the administration of 12 mL of gadoteridol (ProHance) IV contrast. Images were  obtained without contrast; and in late arterial, portal venous, and delayed  phases after the administration of contrast.    Heavily T2-weighted thick slab and thin slice images were obtained in the  oblique coronal plane through the biliary tree (MRCP). FINDINGS:  MRCP: No beaded stricturing and dilation to suggest primary sclerosing  cholangitis (PSC). The proximal common bile duct is at the upper limits of  normal in caliber (6 mm), but tapers smoothly to normal caliber at the ampulla. No pancreatic duct dilation. Incidental note is made of a moderate gallstone and  numerous tiny gallstones. Abdomen: There is minimal, focal, right renal cortical scarring (801-46). A tiny  right renal cyst requires no follow-up. The distal esophagus, stomach, duodenum,  liver, pancreas, spleen, adrenals, kidneys, and visualized portions of the small  bowel, are otherwise normal. Sigmoid diverticulosis is mild. The redundant,  tortuous, transverse colon is a normal variant. There is a small cystocele (2-6,  1-32). 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 No free fluid and  no abdominal lymphadenopathy. Impression  No PSC. Assessment/Plan:       ICD-10-CM ICD-9-CM    1. Chronic active hepatitis ([address]  P94.6 799.07 METABOLIC PANEL, COMPREHENSIVE      CBC WITH AUTOMATED DIFF      2. Parkinson's disease ([address]  G20 332.0       3.  Transaminitis  R74.01 790.4 FERRITIN      IRON PROFILE 4. Ground-level fall  W18.30XA E888.9 XR TIB/FIB LT      XR TIB/FIB RT      5. Current chronic use of systemic steroids  Z79.52 V58.65       6. Steroid-induced hyperglycemia  R73.9 790.29 HEMOGLOBIN A1C WITH EAG    T38.0X5A E932.0       7. Encounter for immunization  Z23 V03.89 pneumococcal 20-brandy conj-dip, PF, (Prevnar 20, PF,) 0.5 mL syrg injection      DISCONTINUED: pneumococcal 20-brandy conj-dip, PF, (Prevnar 20, PF,) 0.5 mL syrg injection             Ground-level fall:  - As per history; no findings on examination today other than some mild tenderness of palpation along the bilateral tibias. Checking x-rays as noted given history of osteopenia but anticipate normal findings. She has no other significant joint pain, and no rib pain, suggestive of a rib fracture. We discussed the importance of safe equipment in the home and she will consider this. Continue follow-up with neurology. Autoimmune hepatitis:  - Previous LFTs had been normalizing after initiation of steroids as well as Imuran. She likely has side effects associate with ongoing steroid use, including hyperglycemia, hypertension, and edema. At this time, as her dosing of steroids continues to be reduced by gastroenterology/hepatology, deferring additional pharmacotherapy in this regard. However, if sustained hypertension she might benefit from amlodipine or other medications including hydrochlorothiazide. Holding off on this for now given the mild degree of elevation of blood pressure, and likely association with steroids which are being tapered. Note hyperglycemia as well; holding off on medications in this regard, but checking A1c today. Prevnar vaccination prescription provided. Parkinson's disease:  - As per history, with recent ground-level fall secondary loss of balance at home. We discussed the importance of safety equipment in the home. He will consider this. Continue follow-up with neurology.       I have reviewed the patient's medical history in detail and updated the computerized patient record. We had a prolonged discussion about these complex clinical issues and went over the various important aspects to consider. All questions were answered. Advised the patient to call back or return to office if symptoms do not improve, change in nature, or persist.     The patient was given an after visit summary or informed of [employer] [facility] which includes patient instructions, diagnoses, current medications, & vitals. she expressed understanding with the diagnosis and plan. Rosalinda Pisano MD    Please note that this dictation was completed with [employer], the computer voice recognition software. Quite often unanticipated grammatical, syntax, homophones, and other interpretive errors are inadvertently transcribed by the computer software. Please disregard these errors. Please excuse any errors that have escaped final proofreading.

## 2023-04-05 ENCOUNTER — OFFICE VISIT (OUTPATIENT)
Dept: INTERNAL MEDICINE CLINIC | Age: 68
End: 2023-04-05

## 2023-04-05 ENCOUNTER — TELEPHONE (OUTPATIENT)
Dept: HEMATOLOGY | Age: 68
End: 2023-04-05

## 2023-04-05 RX ORDER — PNEUMOCOCCAL 20-VALENT CONJUGATE VACCINE 2.2; 2.2; 2.2; 2.2; 2.2; 2.2; 2.2; 2.2; 2.2; 2.2; 2.2; 2.2; 2.2; 2.2; 2.2; 2.2; 4.4; 2.2; 2.2; 2.2 UG/.5ML; UG/.5ML; UG/.5ML; UG/.5ML; UG/.5ML; UG/.5ML; UG/.5ML; UG/.5ML; UG/.5ML; UG/.5ML; UG/.5ML; UG/.5ML; UG/.5ML; UG/.5ML; UG/.5ML; UG/.5ML; UG/.5ML; UG/.5ML; UG/.5ML; UG/.5ML
0.5 INJECTION, SUSPENSION INTRAMUSCULAR
Qty: 1 EACH | Refills: 0 | Status: SHIPPED
Start: 2023-04-05 | End: 2023-04-05 | Stop reason: SDUPTHER

## 2023-04-05 RX ORDER — PNEUMOCOCCAL 20-VALENT CONJUGATE VACCINE 2.2; 2.2; 2.2; 2.2; 2.2; 2.2; 2.2; 2.2; 2.2; 2.2; 2.2; 2.2; 2.2; 2.2; 2.2; 2.2; 4.4; 2.2; 2.2; 2.2 UG/.5ML; UG/.5ML; UG/.5ML; UG/.5ML; UG/.5ML; UG/.5ML; UG/.5ML; UG/.5ML; UG/.5ML; UG/.5ML; UG/.5ML; UG/.5ML; UG/.5ML; UG/.5ML; UG/.5ML; UG/.5ML; UG/.5ML; UG/.5ML; UG/.5ML; UG/.5ML
0.5 INJECTION, SUSPENSION INTRAMUSCULAR
Qty: 1 EACH | Refills: 0 | Status: SHIPPED
Start: 2023-04-05

## 2023-04-05 NOTE — PROGRESS NOTES
Chief Complaint   Patient presents with    Follow-up       Visit Vitals  BP (!) 150/84 (BP 1 Location: Right arm, BP Patient Position: Sitting, BP Cuff Size: Adult)   Pulse (!) 111   Resp 17   Ht 5' 3\" (1.6 m)   Wt 138 lb 3.2 oz (62.7 kg)   SpO2 99%   BMI 24.48 kg/m²       1. \"Have you been to the ER, urgent care clinic since your last visit? Hospitalized since your last visit? \" No    2. \"Have you seen or consulted any other health care providers outside of the [address] [place] [address] since your last visit? \" No     3. For patients aged 39-70: Has the patient had a colonoscopy / FIT/ Cologuard? No      If the patient is female:    4. For patients aged 41-77: Has the patient had a mammogram within the past 2 years? No      5. For patients aged 21-65: Has the patient had a pap smear?  No

## 2023-04-05 NOTE — TELEPHONE ENCOUNTER
Patient called stating she fell 2 weeks ago, and is having swelling, and wants to know if she should take Advil with the current medications she's taking? Would like a call back to discuss.

## 2023-04-05 NOTE — TELEPHONE ENCOUNTER
[email or web address] Spoke w/patient concerning message left about falling within the last week and now have swelling/redness bilateral ankle area. Patient did see PCP today in note in the system, labs was done and xrays Lt/Rt Tib--imaging/labs pending. PCP advise patient to give our office a call about taking Advil for pain at night after the fall. Advise patient to take Tylenol only the recommended dose on the bottle. Advil can cause bleeding. Patient is being weaned down on the Prednisone by Tariq Matthew and PCP thinks that what is causing the redness/bruising around the ankles. Will follow message to Tariq Matthew to review. (KF)

## 2023-04-28 ENCOUNTER — OFFICE VISIT (OUTPATIENT)
Dept: HEMATOLOGY | Age: 68
End: 2023-04-28

## 2023-04-28 VITALS
TEMPERATURE: 96.6 F | HEART RATE: 103 BPM | WEIGHT: 132 LBS | OXYGEN SATURATION: 96 % | BODY MASS INDEX: 23.39 KG/M2 | HEIGHT: 63 IN | SYSTOLIC BLOOD PRESSURE: 141 MMHG | DIASTOLIC BLOOD PRESSURE: 74 MMHG

## 2023-04-28 DIAGNOSIS — K75.4 AUTOIMMUNE HEPATITIS (HCC): Primary | ICD-10-CM

## 2023-04-28 RX ORDER — PREDNISONE 5 MG/1
10 TABLET ORAL DAILY
Qty: 90 TABLET | Refills: 3
Start: 2023-04-28

## 2023-04-28 NOTE — PROGRESS NOTES
[facility], [place], [address], [facility], [place]      Ameya Magana PATomC    Maddy Eid, [facility]-BC   Dylan Reynoso, [facility]-   Jose L Rice, FNP-C   Maryann Balderas, FNP-C    Cara Meade, [facility]-BC       Marti Deputado Luis De [address] 136    at [address], [facility] Christy Molina  22.    [phone]    FAX: [address] [place] [address]    at [address], [address]    [phone]    FAX: [phone]       Patient Care Team:  Sabi Elizalde MD as PCP - General (Internal Medicine Physician)  Sabi Elizalde MD as PCP - [facility]  Quentin Greenberg MD (Gastroenterology)      Problem List  Date Reviewed: 4/28/2023            Codes Class Noted    Osteopenia ICD-10-CM: M85.80  ICD-9-CM: 733.90  9/20/2017        Autoimmune hepatitis ([facility] ICD-10-CM: K75.4  ICD-9-CM: 571.42  12/24/2022        Vitamin D deficiency ICD-10-CM: E55.9  ICD-9-CM: 268.9  Unknown        Parkinson's disease ([facility] ICD-10-CM: G20  ICD-9-CM: 332.0  2019    Overview Signed 5/10/2022 12:04 PM by Sabi Elizalde MD     L handed tremor; no Rx as of 5/22             Eczema ICD-10-CM: L30.9  ICD-9-CM: 692.9  2019    Overview Signed 5/10/2022 12:05 PM by Sabi Elizalde MD     Prev numerous dermatology referrals; currently under care with homeopathy            Fatigue  Increased dose of Cinnemet    Art [facility] for management of autoimmune hepatitis. The active problem list, all pertinent past medical history, medications, liver histology, radiologic findings and laboratory findings related to the liver disorder were reviewed and discussed with the patient. The patient is a 75 y.o.   female who was found to have elevated liver transaminases and alkaline phosphatase in 5/2022. The liver enzymes came down with time and normalized by 9/2022, but then increased again in 10/2022. Liver enzymes improved mid- 10/2022 while patient was on a brief course of steroids for eczema but again elevated when steroids were completed. Serologic evaluation for markers of chronic liver disease was positive for ASMA. The most recent imaging of the liver was Ultrasound performed in 5/2022. Results suggest that the liver is normal.      A liver biopsy in 10/2022 demonstrates mild portal inflammation, and portal fibrosis that is greater than would be expected given the degree of inflammation. Fibroscan performed 12/2022 demonstrated 13.2 kPa which correlates with stage 3 fibrosis. MRCP performed 12/2022 was negative for bile duct disease. The patient was started on 20 mg prednisone daily in 12/2022 to treat autoimmune hepatitis and her liver enzymes have normalized. This was reduced to 10 mg 4/2023. Imuran 50 mg daily was added 2/2023. She is tolerating both medications well. The patient sustained a fall in 4/2023 with significant bruising to her L ribs and bruising and swelling of bilateral lower extremities. Neurology has since increased her dose of Sinemet. The patient has the following symptoms which could be attributed to the liver disorder:    fatigue,  swelling of the hands. The patient is not experiencing the following symptoms which are commonly seen in this liver disorder:   pain in the right side over the liver. The patient completes all daily activities without any functional limitations. ASSESSMENT AND PLAN:  Autoimmune Hepatitis   The diagnosis was based upon laboratory studies, serology that was positive for ASMA and liver biopsy.     A liver biopsy performed in 10/2022 demonstrates mild portal inflammation, mild PMN, no lobular inflammation, no steatosis, and stage 1 fibrosis portal that is greater than the degree of inflammation. Fibroscan in 12/2022 was 13.2 kPa with  consistent with stage 3 fibrosis and no hepatic steatosis. We will repeat Fibroscan 7/2023 to assess for progression/regression of cirrhosis. The patient was started on prednisone 20 mg daily in 12/2022. Reducing dose to 10 mg daily in clinic today. She is tolerating the medication well. Added Imuran 50 mg daily 2/2023. We will avoid use of Cellcept given its interaction with Sinemet resulting in potential CNS depression. Her glucose level remains elevated on recent labs but is trending down. We will continue to monitor for improvement with lower dose of prednisone. Liver transaminases are normal.  ALP is normal.  Liver function is normal.  The platelet count is elevated. Repeat labs ordered today. Elevation in Ferritin  Iron studies from 10/2022 demonstrate an elevation in ferritin with low iron saturation. The patient does not have hemochromatosis and is unlikely to be a carrier for an HFE gene. Suspect the elevation in ferritin reflects hepatic inflammation. Repeat iron studies performed 4/2023 continue to demonstrate an elevation in ferritin but it has trended down from 436 to 366 ng/mL. Iron saturation is now normal.     Screening for Hepatocellular Carcinoma  HCC screening is not necessary if the patient has no evidence of cirrhosis. Treatment of other medical problems in patients with chronic liver disease  There are no contraindications for the patient to take most medications that are necessary for treatment of other medical issues. Counseling for alcohol in patients with chronic liver disease  The patient was counseled regarding alcohol consumption and the effect of alcohol on chronic liver disease. The patient does not consume any significant amount of alcohol. Vaccinations   Vaccination for viral hepatitis B is not needed.   The patient has serologic evidence of prior exposure or vaccination with immunity. Routine vaccinations against other bacterial and viral agents can be performed as indicated. Annual flu vaccination should be administered if indicated. ALLERGIES  No Known Allergies    MEDICATIONS  Current Outpatient Medications   Medication Sig    predniSONE (DELTASONE) 5 mg tablet Take 2 Tablets by mouth daily. Indications: liver inflammation resulting from an abnormal immune response    multivit-min/iron/folic/lutein (CENTRUM SILVER WOMEN PO) Take  by mouth daily. azaTHIOprine (IMURAN) 50 mg tablet Take 1 Tablet by mouth daily. Indications: liver inflammation resulting from an abnormal immune response    calcium-cholecalciferol, D3, (CALTRATE 600+D) tablet Take 1 Tablet by mouth two (2) times a day. ascorbic acid, vitamin C, (VITAMIN C) 1,000 mg tablet Take 1 Tablet by mouth two (2) times a day. ELDERBERRY FRUIT PO Take  by mouth two (2) times a day. carbidopa-levodopa (SINEMET)  mg per tablet Take 1 Tablet by mouth three (3) times daily. No current facility-administered medications for this visit. SYSTEM REVIEW NOT RELATED TO LIVER DISEASE OR REVIEWED ABOVE:  Constitution systems: Negative for fever, chills, weight gain, weight loss. Eyes: Negative for visual changes. ENT: Negative for sore throat, painful swallowing. Respiratory: Negative for cough, hemoptysis, SOB. Cardiology: Negative for chest pain, palpitations. GI:  Negative for constipation or diarrhea. : Negative for urinary frequency, dysuria, hematuria, nocturia. Skin: Negative for rash. Hematology: Negative for easy bruising, blood clots. Musculo-skelatal: Negative for back pain, muscle pain, weakness. Neurologic: (+) Bilateral hand tremor. Negative for headaches, dizziness, vertigo, memory problems not related to HE. Psychology: Negative for anxiety, depression. FAMILY HISTORY:  The father  of heart disease.     The mother had the following chronic disease(s): DM, thyroid disease and  of old age. There is no family history of liver disease. SOCIAL HISTORY:  The patient is . The patient has 2 children, and 3 grandchildren. The patient has never used tobacco products. The patient has never consumed significant amounts of alcohol. The patient used to work as .      PHYSICAL EXAMINATION:  Visit Vitals  BP (!) 141/74 (BP 1 Location: Left upper arm, BP Patient Position: Sitting, BP Cuff Size: Adult)   Pulse (!) 103   Temp (!) 96.6 °F (35.9 °C) (Temporal)   Ht 5' 3\" (1.6 m)   Wt 132 lb (59.9 kg)   SpO2 96%   BMI 23.38 kg/m²       General: No acute distress. Eyes: Sclera anicteric. ENT: No oral lesions. Thyroid normal.  Nodes: No adenopathy. Skin: No spider angiomata. No jaundice. No palmar erythema. Respiratory: Lungs clear to auscultation. Cardiovascular: Regular heart rate. No murmurs. No JVD. Abdomen: Soft non-tender. Liver size normal to percussion/palpation. Spleen not palpable. No obvious ascites. Extremities: No edema. No muscle wasting. No gross arthritic changes. Neurologic: Alert and oriented. Cranial nerves grossly intact. No asterixis. LABORATORY STUDIES:  Additional lab values drawn at today's office visit are pending at the time of documentation.     Liver [place] of [address] [place] [address] Units 2023 3/9/2023 2023   WBC 3.6 - 11.0 K/uL 12.6 (H) 10.9 (H) 10.4   ANC 1.8 - 8.0 K/UL 11.1 (H) 9.7 (H) 9.7 (H)   HGB 11.5 - 16.0 g/dL 12.7 14.2 13.9    - 400 K/uL 420 (H) 235 223   INR 0.9 - 1.1        AST 15 - 37 U/L 19 20 17   ALT 12 - 78 U/L 9 (L) 16 9   Alk Phos 45 - 117 U/L 113 100 112   Bili, Total 0.2 - 1.0 MG/DL 0.6 0.4 0.4   Bili, Direct 0.00 - 0.40 mg/dL  0.15 0.16   Albumin 3.5 - 5.0 g/dL 3.2 (L) 4.4 4.3   BUN 6 - 20 MG/DL 16 17 15   Creat 0.55 - 1.02 MG/DL 0.70 0.84 0.72   Na 136 - 145 mmol/L 136 136 138   K 3.5 - 5.1 mmol/L 3.8 4.5 4.1   Cl 97 - 108 mmol/L 100 97 97   CO2 21 - 32 mmol/L 31 22 24   Glucose 65 - 100 mg/dL 146 (H) 207 (H) 264 (H)       SEROLOGIES:  Serologies Latest Ref Rng & Units 5/10/2022   Hep C Ab NONREACTIVE   NONREACTIVE     Serologies Latest Ref Rng & Units 4/5/2023 10/18/2022   Hep B Surface Ag Index     Hep B Surface Ag Interp Negative       Hep B Core Ab, Total Negative       Hep B Surface Ab mIU/mL     Hep B Surface Ab Interp NONREACTIVE       Hep C Ab NONREACTIVE       Ferritin 8 - 252 NG/ (H) 436 (H)   Iron % Saturation 20 - 50 % 20 10 (L)   ARLEY, IFA   Negative   C-ANCA Neg:<1:20 titer  <1:20   P-ANCA Neg:<1:20 titer  <1:20   ANCA Neg:<1:20 titer  <1:20   ASMCA 0 - 19 Units     M2 Ab 0.0 - 20.0 Units     Alpha-1 antitrypsin level 101 - 187 mg/dL  272 (H)     Serologies Latest Ref Rng & Units 5/18/2022   Hep B Surface Ag Index 0.38   Hep B Surface Ag Interp Negative   Negative   Hep B Core Ab, Total Negative   Negative   Hep B Surface Ab mIU/mL 11.40   Hep B Surface Ab Interp NONREACTIVE   REACTIVE (A)   Hep C Ab NONREACTIVE      Ferritin 8 - 252 NG/ML    Iron % Saturation 20 - 50 %    ARLEY, IFA     C-ANCA Neg:<1:20 titer    P-ANCA Neg:<1:20 titer    ANCA Neg:<1:20 titer    ASMCA 0 - 19 Units 22 (H)   M2 Ab 0.0 - 20.0 Units <20.0   Alpha-1 antitrypsin level 101 - 187 mg/dL      LIVER HISTOLOGY:  10/2022. Slides reviewed by MLS.mild portal inflammation, mild PMN, no lobular inflammation, no steatosis, and stage 1 fibrosis portal that is greater than the degree of inflammation. 12/2022. FibroScan performed at [facility] [place]. EkPa was 13.2. IQR/med 8%. . The results suggested a fibrosis level of F3. The CAP score suggests there is no significant hepatic steatosis. ENDOSCOPIC PROCEDURES:  Not available or performed    RADIOLOGY:  5/2022. Ultrasound of liver. Normal appearing liver. No liver mass lesions. 12/2022. MRI Abd/MRCP. Negative for PSC. Moderate gallstone, numerous tiny gallstones.      OTHER TESTING:  Not available or performed    FOLLOW-UP:  All of the issues listed above in the Assessment and Plan were discussed with the patient. All questions were answered. The patient expressed a clear understanding of the above. [address] [address] in 3 months for Fibroscan. Continue with monthly labs.      Delroy Garcia, [facility] of [address] [address] [address], [address] [place] [address] 22.  [phone]  [address] [place] [address]

## 2023-04-28 NOTE — PROGRESS NOTES
Identified pt with two pt identifiers(name and ). Reviewed record in preparation for visit and have obtained necessary documentation. Chief Complaint   Patient presents with    Follow-up      Vitals:    23 1317   BP: (!) 141/74   Pulse: (!) 103   Temp: (!) 96.6 °F (35.9 °C)   TempSrc: Temporal   SpO2: 96%   Weight: 132 lb (59.9 kg)   Height: 5' 3\" (1.6 m)   PainSc:   0 - No pain       Health Maintenance Review: Patient reminded of \"due or due soon\" health maintenance. I have asked the patient to contact his/her primary care provider (PCP) for follow-up on his/her health maintenance. Coordination of Care Questionnaire:  :   1) Have you been to an emergency room, urgent care, or hospitalized since your last visit? If yes, where when, and reason for visit? no       2. Have seen or consulted any other health care provider since your last visit? If yes, where when, and reason for visit? YES per pt for a check up and blood work      Patient is accompanied by self I have received verbal consent from [facility] to discuss any/all medical information while they are present in the room.

## 2023-04-29 LAB
ALBUMIN SERPL-MCNC: 3.7 G/DL (ref 3.5–5)
ALBUMIN/GLOB SERPL: 1.2 (ref 1.1–2.2)
ALP SERPL-CCNC: 180 U/L (ref 45–117)
ALT SERPL-CCNC: 23 U/L (ref 12–78)
ANION GAP SERPL CALC-SCNC: 7 MMOL/L (ref 5–15)
AST SERPL-CCNC: 25 U/L (ref 15–37)
BASOPHILS # BLD: 0.1 K/UL (ref 0–0.1)
BASOPHILS NFR BLD: 1 % (ref 0–1)
BILIRUB DIRECT SERPL-MCNC: 0.1 MG/DL (ref 0–0.2)
BILIRUB SERPL-MCNC: 0.3 MG/DL (ref 0.2–1)
BUN SERPL-MCNC: 16 MG/DL (ref 6–20)
BUN/CREAT SERPL: 22 (ref 12–20)
CALCIUM SERPL-MCNC: 9.3 MG/DL (ref 8.5–10.1)
CHLORIDE SERPL-SCNC: 104 MMOL/L (ref 97–108)
CO2 SERPL-SCNC: 25 MMOL/L (ref 21–32)
CREAT SERPL-MCNC: 0.73 MG/DL (ref 0.55–1.02)
DIFFERENTIAL METHOD BLD: ABNORMAL
EOSINOPHIL # BLD: 0 K/UL (ref 0–0.4)
EOSINOPHIL NFR BLD: 0 % (ref 0–7)
ERYTHROCYTE [DISTWIDTH] IN BLOOD BY AUTOMATED COUNT: 12.1 % (ref 11.5–14.5)
GLOBULIN SER CALC-MCNC: 3.2 G/DL (ref 2–4)
GLUCOSE SERPL-MCNC: 259 MG/DL (ref 65–100)
HCT VFR BLD AUTO: 40 % (ref 35–47)
HGB BLD-MCNC: 13 G/DL (ref 11.5–16)
IMM GRANULOCYTES # BLD AUTO: 0.1 K/UL (ref 0–0.04)
IMM GRANULOCYTES NFR BLD AUTO: 1 % (ref 0–0.5)
LYMPHOCYTES # BLD: 0.6 K/UL (ref 0.8–3.5)
LYMPHOCYTES NFR BLD: 5 % (ref 12–49)
MCH RBC QN AUTO: 30.6 PG (ref 26–34)
MCHC RBC AUTO-ENTMCNC: 32.5 G/DL (ref 30–36.5)
MCV RBC AUTO: 94.1 FL (ref 80–99)
MONOCYTES # BLD: 0.2 K/UL (ref 0–1)
MONOCYTES NFR BLD: 2 % (ref 5–13)
NEUTS SEG # BLD: 11.1 K/UL (ref 1.8–8)
NEUTS SEG NFR BLD: 91 % (ref 32–75)
NRBC # BLD: 0 K/UL (ref 0–0.01)
NRBC BLD-RTO: 0 PER 100 WBC
PLATELET # BLD AUTO: 314 K/UL (ref 150–400)
PMV BLD AUTO: 10.8 FL (ref 8.9–12.9)
POTASSIUM SERPL-SCNC: 4.3 MMOL/L (ref 3.5–5.1)
PROT SERPL-MCNC: 6.9 G/DL (ref 6.4–8.2)
RBC # BLD AUTO: 4.25 M/UL (ref 3.8–5.2)
RBC MORPH BLD: ABNORMAL
SODIUM SERPL-SCNC: 136 MMOL/L (ref 136–145)
WBC # BLD AUTO: 12.1 K/UL (ref 3.6–11)

## 2023-05-02 ENCOUNTER — TELEPHONE (OUTPATIENT)
Dept: INTERNAL MEDICINE CLINIC | Age: 68
End: 2023-05-02

## 2023-05-02 ENCOUNTER — TELEPHONE (OUTPATIENT)
Dept: HEMATOLOGY | Age: 68
End: 2023-05-02

## 2023-05-02 ENCOUNTER — APPOINTMENT (OUTPATIENT)
Dept: INTERNAL MEDICINE CLINIC | Age: 68
End: 2023-05-02

## 2023-05-02 DIAGNOSIS — R39.15 URINARY URGENCY: ICD-10-CM

## 2023-05-02 DIAGNOSIS — N39.0 URINARY TRACT INFECTION WITHOUT HEMATURIA, SITE UNSPECIFIED: ICD-10-CM

## 2023-05-02 DIAGNOSIS — R39.15 URINARY URGENCY: Primary | ICD-10-CM

## 2023-05-02 RX ORDER — UREA 10 %
2 LOTION (ML) TOPICAL 2 TIMES DAILY
Qty: 28 TABLET | Refills: 0 | Status: SHIPPED | OUTPATIENT
Start: 2023-05-02 | End: 2023-05-09

## 2023-05-02 RX ORDER — NITROFURANTOIN 25; 75 MG/1; MG/1
100 CAPSULE ORAL 2 TIMES DAILY
Qty: 10 CAPSULE | Refills: 0 | Status: SHIPPED | OUTPATIENT
Start: 2023-05-02 | End: 2023-05-05

## 2023-05-03 LAB
APPEARANCE UR: ABNORMAL
BACTERIA URNS QL MICRO: ABNORMAL /HPF
BILIRUB UR QL: NEGATIVE
COLOR UR: ABNORMAL
EPITH CASTS URNS QL MICRO: ABNORMAL /LPF
GLUCOSE UR STRIP.AUTO-MCNC: 100 MG/DL
HGB UR QL STRIP: ABNORMAL
KETONES UR QL STRIP.AUTO: ABNORMAL MG/DL
LEUKOCYTE ESTERASE UR QL STRIP.AUTO: ABNORMAL
NITRITE UR QL STRIP.AUTO: POSITIVE
PH UR STRIP: >8.5 [PH] (ref 5–8)
PROT UR STRIP-MCNC: 300 MG/DL
RBC #/AREA URNS HPF: ABNORMAL /HPF (ref 0–5)
SP GR UR REFRACTOMETRY: 1.02 (ref 1–1.03)
UROBILINOGEN UR QL STRIP.AUTO: 0.2 EU/DL (ref 0.2–1)
WBC URNS QL MICRO: ABNORMAL /HPF (ref 0–4)

## 2023-05-05 ENCOUNTER — TELEPHONE (OUTPATIENT)
Dept: HEMATOLOGY | Age: 68
End: 2023-05-05

## 2023-05-05 DIAGNOSIS — B96.4 URINARY TRACT INFECTION DUE TO PROTEUS: Primary | ICD-10-CM

## 2023-05-05 DIAGNOSIS — N39.0 URINARY TRACT INFECTION DUE TO PROTEUS: Primary | ICD-10-CM

## 2023-05-05 LAB
BACTERIA SPEC CULT: ABNORMAL
CC UR VC: ABNORMAL
SERVICE CMNT-IMP: ABNORMAL

## 2023-05-05 RX ORDER — SULFAMETHOXAZOLE AND TRIMETHOPRIM 800; 160 MG/1; MG/1
1 TABLET ORAL 2 TIMES DAILY
Qty: 10 TABLET | Refills: 0 | Status: SHIPPED | OUTPATIENT
Start: 2023-05-05 | End: 2023-05-10

## 2023-05-09 DIAGNOSIS — K75.4 AUTOIMMUNE HEPATITIS (HCC): Primary | ICD-10-CM

## 2023-06-06 LAB
ALBUMIN SERPL-MCNC: 4.3 G/DL (ref 3.8–4.8)
ALP SERPL-CCNC: 120 IU/L (ref 44–121)
ALT SERPL-CCNC: 9 IU/L (ref 0–32)
AST SERPL-CCNC: 22 IU/L (ref 0–40)
BASOPHILS # BLD AUTO: 0 X10E3/UL (ref 0–0.2)
BASOPHILS NFR BLD AUTO: 1 %
BILIRUB DIRECT SERPL-MCNC: <0.1 MG/DL (ref 0–0.4)
BILIRUB SERPL-MCNC: <0.2 MG/DL (ref 0–1.2)
BUN SERPL-MCNC: 18 MG/DL (ref 8–27)
BUN/CREAT SERPL: 28 (ref 12–28)
CALCIUM SERPL-MCNC: 9.6 MG/DL (ref 8.7–10.3)
CHLORIDE SERPL-SCNC: 101 MMOL/L (ref 96–106)
CO2 SERPL-SCNC: 22 MMOL/L (ref 20–29)
CREAT SERPL-MCNC: 0.64 MG/DL (ref 0.57–1)
EGFRCR SERPLBLD CKD-EPI 2021: 96 ML/MIN/1.73
EOSINOPHIL # BLD AUTO: 0 X10E3/UL (ref 0–0.4)
EOSINOPHIL NFR BLD AUTO: 0 %
ERYTHROCYTE [DISTWIDTH] IN BLOOD BY AUTOMATED COUNT: 12 % (ref 11.7–15.4)
GLUCOSE SERPL-MCNC: 268 MG/DL (ref 70–99)
HCT VFR BLD AUTO: 38.7 % (ref 34–46.6)
HGB BLD-MCNC: 13.2 G/DL (ref 11.1–15.9)
IMM GRANULOCYTES # BLD AUTO: 0.1 X10E3/UL (ref 0–0.1)
IMM GRANULOCYTES NFR BLD AUTO: 1 %
LYMPHOCYTES # BLD AUTO: 0.6 X10E3/UL (ref 0.7–3.1)
LYMPHOCYTES NFR BLD AUTO: 7 %
MCH RBC QN AUTO: 30.7 PG (ref 26.6–33)
MCHC RBC AUTO-ENTMCNC: 34.1 G/DL (ref 31.5–35.7)
MCV RBC AUTO: 90 FL (ref 79–97)
MONOCYTES # BLD AUTO: 0.2 X10E3/UL (ref 0.1–0.9)
MONOCYTES NFR BLD AUTO: 2 %
NEUTROPHILS # BLD AUTO: 7.8 X10E3/UL (ref 1.4–7)
NEUTROPHILS NFR BLD AUTO: 89 %
PLATELET # BLD AUTO: 267 X10E3/UL (ref 150–450)
POTASSIUM SERPL-SCNC: 4.5 MMOL/L (ref 3.5–5.2)
PROT SERPL-MCNC: 6.7 G/DL (ref 6–8.5)
RBC # BLD AUTO: 4.3 X10E6/UL (ref 3.77–5.28)
SODIUM SERPL-SCNC: 140 MMOL/L (ref 134–144)
WBC # BLD AUTO: 8.7 X10E3/UL (ref 3.4–10.8)

## 2023-07-11 LAB
ALBUMIN SERPL-MCNC: 4.5 G/DL (ref 3.9–4.9)
ALP SERPL-CCNC: 111 IU/L (ref 44–121)
ALT SERPL-CCNC: 11 IU/L (ref 0–32)
AST SERPL-CCNC: 19 IU/L (ref 0–40)
BASOPHILS # BLD AUTO: 0 X10E3/UL (ref 0–0.2)
BASOPHILS NFR BLD AUTO: 0 %
BILIRUB DIRECT SERPL-MCNC: <0.1 MG/DL (ref 0–0.4)
BILIRUB SERPL-MCNC: 0.2 MG/DL (ref 0–1.2)
BUN SERPL-MCNC: 18 MG/DL (ref 8–27)
BUN/CREAT SERPL: 23 (ref 12–28)
CALCIUM SERPL-MCNC: 9.7 MG/DL (ref 8.7–10.3)
CHLORIDE SERPL-SCNC: 100 MMOL/L (ref 96–106)
CO2 SERPL-SCNC: 22 MMOL/L (ref 20–29)
CREAT SERPL-MCNC: 0.8 MG/DL (ref 0.57–1)
EGFRCR SERPLBLD CKD-EPI 2021: 80 ML/MIN/1.73
EOSINOPHIL # BLD AUTO: 0 X10E3/UL (ref 0–0.4)
EOSINOPHIL NFR BLD AUTO: 0 %
ERYTHROCYTE [DISTWIDTH] IN BLOOD BY AUTOMATED COUNT: 12.2 % (ref 11.7–15.4)
GLUCOSE SERPL-MCNC: 286 MG/DL (ref 70–99)
HCT VFR BLD AUTO: 41.2 % (ref 34–46.6)
HGB BLD-MCNC: 14 G/DL (ref 11.1–15.9)
IMM GRANULOCYTES # BLD AUTO: 0.1 X10E3/UL (ref 0–0.1)
IMM GRANULOCYTES NFR BLD AUTO: 1 %
LYMPHOCYTES # BLD AUTO: 0.6 X10E3/UL (ref 0.7–3.1)
LYMPHOCYTES NFR BLD AUTO: 7 %
MCH RBC QN AUTO: 30.4 PG (ref 26.6–33)
MCHC RBC AUTO-ENTMCNC: 34 G/DL (ref 31.5–35.7)
MCV RBC AUTO: 90 FL (ref 79–97)
MONOCYTES # BLD AUTO: 0.3 X10E3/UL (ref 0.1–0.9)
MONOCYTES NFR BLD AUTO: 3 %
NEUTROPHILS # BLD AUTO: 8.1 X10E3/UL (ref 1.4–7)
NEUTROPHILS NFR BLD AUTO: 89 %
PLATELET # BLD AUTO: 267 X10E3/UL (ref 150–450)
POTASSIUM SERPL-SCNC: 4.3 MMOL/L (ref 3.5–5.2)
PROT SERPL-MCNC: 6.8 G/DL (ref 6–8.5)
RBC # BLD AUTO: 4.6 X10E6/UL (ref 3.77–5.28)
SODIUM SERPL-SCNC: 139 MMOL/L (ref 134–144)
WBC # BLD AUTO: 9.1 X10E3/UL (ref 3.4–10.8)

## 2023-07-25 ENCOUNTER — OFFICE VISIT (OUTPATIENT)
Facility: CLINIC | Age: 68
End: 2023-07-25
Payer: MEDICARE

## 2023-07-25 VITALS
SYSTOLIC BLOOD PRESSURE: 140 MMHG | BODY MASS INDEX: 24.98 KG/M2 | OXYGEN SATURATION: 96 % | WEIGHT: 141 LBS | DIASTOLIC BLOOD PRESSURE: 80 MMHG | HEIGHT: 63 IN | TEMPERATURE: 98.4 F | RESPIRATION RATE: 18 BRPM | HEART RATE: 94 BPM

## 2023-07-25 DIAGNOSIS — Z79.52 LONG TERM SYSTEMIC STEROID USER: ICD-10-CM

## 2023-07-25 DIAGNOSIS — G20 PARKINSON'S DISEASE (HCC): ICD-10-CM

## 2023-07-25 DIAGNOSIS — E55.9 VITAMIN D DEFICIENCY: ICD-10-CM

## 2023-07-25 DIAGNOSIS — Z00.00 MEDICARE ANNUAL WELLNESS VISIT, SUBSEQUENT: ICD-10-CM

## 2023-07-25 DIAGNOSIS — L30.9 ECZEMA, UNSPECIFIED TYPE: ICD-10-CM

## 2023-07-25 DIAGNOSIS — R73.09 ELEVATED GLUCOSE: ICD-10-CM

## 2023-07-25 DIAGNOSIS — K75.4 AUTOIMMUNE HEPATITIS (HCC): Primary | ICD-10-CM

## 2023-07-25 PROCEDURE — 1123F ACP DISCUSS/DSCN MKR DOCD: CPT | Performed by: NURSE PRACTITIONER

## 2023-07-25 PROCEDURE — G0439 PPPS, SUBSEQ VISIT: HCPCS | Performed by: NURSE PRACTITIONER

## 2023-07-25 ASSESSMENT — PATIENT HEALTH QUESTIONNAIRE - PHQ9
2. FEELING DOWN, DEPRESSED OR HOPELESS: 0
SUM OF ALL RESPONSES TO PHQ QUESTIONS 1-9: 0
SUM OF ALL RESPONSES TO PHQ9 QUESTIONS 1 & 2: 0
SUM OF ALL RESPONSES TO PHQ QUESTIONS 1-9: 0
1. LITTLE INTEREST OR PLEASURE IN DOING THINGS: 0

## 2023-07-25 ASSESSMENT — LIFESTYLE VARIABLES
HOW MANY STANDARD DRINKS CONTAINING ALCOHOL DO YOU HAVE ON A TYPICAL DAY: PATIENT DOES NOT DRINK
HOW OFTEN DO YOU HAVE A DRINK CONTAINING ALCOHOL: NEVER

## 2023-07-26 LAB
25(OH)D3 SERPL-MCNC: 28.9 NG/ML (ref 30–100)
APPEARANCE UR: CLEAR
BACTERIA URNS QL MICRO: NEGATIVE /HPF
BILIRUB UR QL: NEGATIVE
CHOLEST SERPL-MCNC: 256 MG/DL
COLOR UR: ABNORMAL
EPITH CASTS URNS QL MICRO: ABNORMAL /LPF
EST. AVERAGE GLUCOSE BLD GHB EST-MCNC: 140 MG/DL
GLUCOSE UR STRIP.AUTO-MCNC: NEGATIVE MG/DL
HBA1C MFR BLD: 6.5 % (ref 4–5.6)
HDLC SERPL-MCNC: 87 MG/DL
HDLC SERPL: 2.9 (ref 0–5)
HGB UR QL STRIP: NEGATIVE
HYALINE CASTS URNS QL MICRO: ABNORMAL /LPF (ref 0–5)
KETONES UR QL STRIP.AUTO: NEGATIVE MG/DL
LDLC SERPL CALC-MCNC: 124.8 MG/DL (ref 0–100)
LEUKOCYTE ESTERASE UR QL STRIP.AUTO: ABNORMAL
NITRITE UR QL STRIP.AUTO: NEGATIVE
PH UR STRIP: 6 (ref 5–8)
PROT UR STRIP-MCNC: NEGATIVE MG/DL
RBC #/AREA URNS HPF: ABNORMAL /HPF (ref 0–5)
SP GR UR REFRACTOMETRY: 1.01 (ref 1–1.03)
TRIGL SERPL-MCNC: 221 MG/DL
TSH SERPL DL<=0.05 MIU/L-ACNC: 0.88 UIU/ML (ref 0.36–3.74)
URINE CULTURE IF INDICATED: ABNORMAL
UROBILINOGEN UR QL STRIP.AUTO: 0.2 EU/DL (ref 0.2–1)
VLDLC SERPL CALC-MCNC: 44.2 MG/DL
WBC URNS QL MICRO: ABNORMAL /HPF (ref 0–4)

## 2023-07-28 ENCOUNTER — OFFICE VISIT (OUTPATIENT)
Age: 68
End: 2023-07-28
Payer: MEDICARE

## 2023-07-28 VITALS
TEMPERATURE: 97.8 F | DIASTOLIC BLOOD PRESSURE: 81 MMHG | HEIGHT: 63 IN | SYSTOLIC BLOOD PRESSURE: 148 MMHG | WEIGHT: 140.6 LBS | HEART RATE: 94 BPM | OXYGEN SATURATION: 97 % | BODY MASS INDEX: 24.91 KG/M2

## 2023-07-28 DIAGNOSIS — K75.4 AUTOIMMUNE HEPATITIS (HCC): Primary | ICD-10-CM

## 2023-07-28 PROCEDURE — 99214 OFFICE O/P EST MOD 30 MIN: CPT | Performed by: NURSE PRACTITIONER

## 2023-07-28 PROCEDURE — 1123F ACP DISCUSS/DSCN MKR DOCD: CPT | Performed by: NURSE PRACTITIONER

## 2023-07-28 PROCEDURE — 91200 LIVER ELASTOGRAPHY: CPT | Performed by: NURSE PRACTITIONER

## 2023-07-28 RX ORDER — TRIAMCINOLONE ACETONIDE 0.25 MG/G
OINTMENT TOPICAL
Qty: 454 G | Refills: 3 | Status: SHIPPED | OUTPATIENT
Start: 2023-07-28 | End: 2023-08-04

## 2023-07-28 ASSESSMENT — PATIENT HEALTH QUESTIONNAIRE - PHQ9
2. FEELING DOWN, DEPRESSED OR HOPELESS: 0
SUM OF ALL RESPONSES TO PHQ9 QUESTIONS 1 & 2: 0
1. LITTLE INTEREST OR PLEASURE IN DOING THINGS: 0
SUM OF ALL RESPONSES TO PHQ QUESTIONS 1-9: 0

## 2023-07-28 NOTE — PATIENT INSTRUCTIONS
Prednisone Taper:  7.5 mg for 10 days  5 mg for 10 days  2.5 mg for 10 days    Labs in 6 weeks. Then prior to your next office visit.

## 2023-07-31 ASSESSMENT — ENCOUNTER SYMPTOMS
SINUS PAIN: 0
RECTAL PAIN: 0
APNEA: 0
NAUSEA: 0
TROUBLE SWALLOWING: 0
WHEEZING: 0
FACIAL SWELLING: 0
DIARRHEA: 0
EYE REDNESS: 0
BACK PAIN: 0
COLOR CHANGE: 0
VOMITING: 0
EYE PAIN: 0
COUGH: 0
ANAL BLEEDING: 0
PHOTOPHOBIA: 0
ABDOMINAL PAIN: 0
EYE DISCHARGE: 0
BLOOD IN STOOL: 0
SINUS PRESSURE: 0
SHORTNESS OF BREATH: 0
SORE THROAT: 0
CHOKING: 0
CONSTIPATION: 0

## 2023-07-31 NOTE — PATIENT INSTRUCTIONS
Learning About Vision Tests  What are vision tests? The four most common vision tests are visual acuity tests, refraction, visual field tests, and color vision tests. Visual acuity (sharpness) tests  These tests are used: To see if you need glasses or contact lenses. To monitor an eye problem. To check an eye injury. Visual acuity tests are done as part of routine exams. You may also have this test when you get your 's license or apply for some types of jobs. Visual field tests  These tests are used: To check for vision loss in any area of your range of vision. To screen for certain eye diseases. To look for nerve damage after a stroke, head injury, or other problem that could reduce blood flow to the brain. Refraction and color tests  A refraction test is done to find the right prescription for glasses and contact lenses. A color vision test is done to check for color blindness. Color vision is often tested as part of a routine exam. You may also have this test when you apply for a job where recognizing different colors is important, such as , electronics, or the [employer]. How are vision tests done? Visual acuity test   You cover one eye at a time. You read aloud from a wall chart across the room. You read aloud from a small card that you hold in your hand. Refraction   You look into a special device. The device puts lenses of different strengths in front of each eye to see how strong your glasses or contact lenses need to be. Visual field tests   Your doctor may have you look through special machines. Or your doctor may simply have you stare straight ahead while they move a finger into and out of your field of vision. Color vision test   You look at pieces of printed test patterns in various colors. You say what number or symbol you see. Your doctor may have you trace the number or symbol using a pointer. How do these tests feel?   There is very little chance of

## 2023-09-01 ENCOUNTER — TELEPHONE (OUTPATIENT)
Age: 68
End: 2023-09-01

## 2023-09-01 NOTE — TELEPHONE ENCOUNTER
Patient called asking to speak with Rose Lora about starting back to taking her \"histamine\" for eczema? ?  She would like to discuss before COB today if possible

## 2023-09-01 NOTE — TELEPHONE ENCOUNTER
Pt is experiencing worsening eczema symptoms. She would like to resume taking histaeze. No known drug interactions with imuran. She is unable to be seen by Derm until 10/2023, encouraged her to check in with PCP for potential earlier intervention. She will have labs drawn for us in a week and a half. No additional questions at this time.

## 2023-09-06 ENCOUNTER — NURSE ONLY (OUTPATIENT)
Facility: CLINIC | Age: 68
End: 2023-09-06

## 2023-09-06 DIAGNOSIS — R39.9 UTI SYMPTOMS: Primary | ICD-10-CM

## 2023-09-07 LAB
APPEARANCE UR: ABNORMAL
BACTERIA URNS QL MICRO: ABNORMAL /HPF
BILIRUB UR QL: NEGATIVE
COLOR UR: ABNORMAL
EPITH CASTS URNS QL MICRO: ABNORMAL /LPF
GLUCOSE UR STRIP.AUTO-MCNC: NEGATIVE MG/DL
HGB UR QL STRIP: ABNORMAL
KETONES UR QL STRIP.AUTO: NEGATIVE MG/DL
LEUKOCYTE ESTERASE UR QL STRIP.AUTO: ABNORMAL
NITRITE UR QL STRIP.AUTO: POSITIVE
PH UR STRIP: 7 (ref 5–8)
PROT UR STRIP-MCNC: 100 MG/DL
RBC #/AREA URNS HPF: ABNORMAL /HPF (ref 0–5)
SP GR UR REFRACTOMETRY: 1.02 (ref 1–1.03)
UROBILINOGEN UR QL STRIP.AUTO: 0.2 EU/DL (ref 0.2–1)
WBC URNS QL MICRO: >100 /HPF (ref 0–4)

## 2023-09-08 ENCOUNTER — TELEPHONE (OUTPATIENT)
Facility: CLINIC | Age: 68
End: 2023-09-08

## 2023-09-08 DIAGNOSIS — R39.9 UTI SYMPTOMS: Primary | ICD-10-CM

## 2023-09-08 RX ORDER — NITROFURANTOIN 25; 75 MG/1; MG/1
100 CAPSULE ORAL 2 TIMES DAILY
Qty: 20 CAPSULE | Refills: 0 | Status: SHIPPED | OUTPATIENT
Start: 2023-09-08 | End: 2023-09-18

## 2023-09-08 NOTE — TELEPHONE ENCOUNTER
Patient called requesting her U/A results. Would like some kind of medication/antibiotic prescribed to her before the weekend because she is experiencing discomfort when urinating.     Would like Rx sent to: [facility] [address]     CB: [phone]

## 2023-09-09 LAB
BACTERIA SPEC CULT: ABNORMAL
CC UR VC: ABNORMAL
SERVICE CMNT-IMP: ABNORMAL

## 2023-09-11 ENCOUNTER — TELEPHONE (OUTPATIENT)
Facility: CLINIC | Age: 68
End: 2023-09-11

## 2023-09-11 NOTE — TELEPHONE ENCOUNTER
Patient was prescribed macrobid for uti. The lab results have come back and she would like the results. Please call.      [phone]

## 2023-09-12 LAB
ALBUMIN SERPL-MCNC: 4.2 G/DL (ref 3.9–4.9)
ALP SERPL-CCNC: 124 IU/L (ref 44–121)
ALT SERPL-CCNC: 9 IU/L (ref 0–32)
AST SERPL-CCNC: 19 IU/L (ref 0–40)
BASOPHILS # BLD AUTO: 0.1 X10E3/UL (ref 0–0.2)
BASOPHILS NFR BLD AUTO: 1 %
BILIRUB DIRECT SERPL-MCNC: <0.1 MG/DL (ref 0–0.4)
BILIRUB SERPL-MCNC: 0.2 MG/DL (ref 0–1.2)
BUN SERPL-MCNC: 13 MG/DL (ref 8–27)
BUN/CREAT SERPL: 21 (ref 12–28)
CALCIUM SERPL-MCNC: 9.3 MG/DL (ref 8.7–10.3)
CHLORIDE SERPL-SCNC: 100 MMOL/L (ref 96–106)
CO2 SERPL-SCNC: 24 MMOL/L (ref 20–29)
CREAT SERPL-MCNC: 0.61 MG/DL (ref 0.57–1)
EGFRCR SERPLBLD CKD-EPI 2021: 97 ML/MIN/1.73
EOSINOPHIL # BLD AUTO: 0.2 X10E3/UL (ref 0–0.4)
EOSINOPHIL NFR BLD AUTO: 3 %
ERYTHROCYTE [DISTWIDTH] IN BLOOD BY AUTOMATED COUNT: 11.8 % (ref 11.7–15.4)
GLUCOSE SERPL-MCNC: 165 MG/DL (ref 70–99)
HCT VFR BLD AUTO: 37.2 % (ref 34–46.6)
HGB BLD-MCNC: 12.4 G/DL (ref 11.1–15.9)
IMM GRANULOCYTES # BLD AUTO: 0 X10E3/UL (ref 0–0.1)
IMM GRANULOCYTES NFR BLD AUTO: 1 %
LYMPHOCYTES # BLD AUTO: 1.1 X10E3/UL (ref 0.7–3.1)
LYMPHOCYTES NFR BLD AUTO: 17 %
MCH RBC QN AUTO: 30.2 PG (ref 26.6–33)
MCHC RBC AUTO-ENTMCNC: 33.3 G/DL (ref 31.5–35.7)
MCV RBC AUTO: 91 FL (ref 79–97)
MONOCYTES # BLD AUTO: 0.4 X10E3/UL (ref 0.1–0.9)
MONOCYTES NFR BLD AUTO: 6 %
NEUTROPHILS # BLD AUTO: 4.7 X10E3/UL (ref 1.4–7)
NEUTROPHILS NFR BLD AUTO: 72 %
PLATELET # BLD AUTO: 315 X10E3/UL (ref 150–450)
POTASSIUM SERPL-SCNC: 4.1 MMOL/L (ref 3.5–5.2)
PROT SERPL-MCNC: 6.6 G/DL (ref 6–8.5)
RBC # BLD AUTO: 4.11 X10E6/UL (ref 3.77–5.28)
SODIUM SERPL-SCNC: 138 MMOL/L (ref 134–144)
WBC # BLD AUTO: 6.5 X10E3/UL (ref 3.4–10.8)

## 2023-09-13 NOTE — TELEPHONE ENCOUNTER
Spoke with patient about lab results, two patient identifiers verified. Patient verbalized understanding.

## 2023-09-20 NOTE — PROGRESS NOTES
This note will not be viewable in [address] Subjective:     Mrs. Stephens Patella to the office today in follow-up of urinary tract infection. Patient states that she got ill last week and 3 [facility] which time she was diagnosed as having a urinary tract infection. It is not clear to the patient whether she had a urine culture done. She was given Bactrim DS to be taken for 3 days. The patient notes her dysuria has improved but she continues to feel poorly. She denies any chills nausea vomiting back pain or abdominal pain. There is been no rash. She denies any joint pain. There is been no blood in her urine. Past Medical History:   Diagnosis Date    Allergic rhinitis 9/20/2017    Osteopenia 9/20/2017     History reviewed. No pertinent surgical history. No Known Allergies  Current Outpatient Prescriptions   Medication Sig Dispense Refill    levoFLOXacin (LEVAQUIN) 250 mg tablet Take 1 Tab by mouth daily for 5 days. 5 Tab 0    calcium-cholecalciferol, D3, (CALTRATE 600+D) tablet Take 1 Tab by mouth daily.  cranberry extract 450 mg tab tablet Take 450 mg by mouth.  ascorbic acid, vitamin C, (VITAMIN C) 500 mg tablet Take 1,000 mg by mouth daily.  multivitamin (ONE A DAY) tablet Take 1 Tab by mouth daily.        Social History     Social History    Marital status:      Spouse name: N/A    Number of children: N/A    Years of education: N/A     Social History Main Topics    Smoking status: Never Smoker    Smokeless tobacco: Never Used    Alcohol use No    Drug use: No    Sexual activity: Not Asked     Other Topics Concern    None     Social History Narrative     Family History   Problem Relation Age of Onset    Elevated Lipids Mother     Cancer Mother      breast    Hypertension Father     Diabetes Father     Macular Degen Father     Heart Disease Father        Review of Systems:  GEN: no weight loss, weight gain, fatigue or night sweats  CV: no PND, orthopnea, or palpitations  Resp: no dyspnea on exertion, no cough  Abd: no nausea, vomiting or diarrhea  EXT: denies edema, claudication  : No frequency or dysuria  Neurological ROS: no TIA or stroke symptoms  ROS otherwise negative      Objective:     Visit Vitals    /72 (BP 1 Location: Left arm, BP Patient Position: Sitting)    Temp 100.2 °F (37.9 °C) (Oral)    Ht 5' 5\" (1.651 m)    Wt 147 lb 3.2 oz (66.8 kg)    BMI 24.5 kg/m2     Body mass index is 24.5 kg/(m^2). General:   alert, cooperative and no distress   Eyes: conjunctivae/sclerae clear. PERRL, EOM's intact   Mouth:  No oral lesions, no pharyngeal erythema, no exudates   Neck: Trachea midline, no thyromegaly, no bruits   Heart: S1 and S2 normal,no murmurs noted    Lungs: Clear to auscultation bilaterally, no increased work of breathing   Abdomen: Soft, nontender. Normal bowel sounds   Extremities: No edema or cyanosis   Neuro: ..alert, oriented x3,speech normal in context and clarity, cranial nerves II-XII intact,motor strength: full proximally and distally,gait: normal  reflexes: full and symmetric     Physical exam otherwise negative         Assessment/Plan:     Diagnoses and all orders for this visit:    Urinary tract infection without hematuria, site unspecified  -     levoFLOXacin (LEVAQUIN) 250 mg tablet; Take 1 Tab by mouth daily for 5 days. , Normal, Disp-5 Tab, R-0        Other instructions: The patient is still running a low-grade fever and I will extend her course of antibiotics with Levaquin 250 mg daily. The patient did have a urine culture done on 9/21 at which time she grew E. coli sensitive to all antibiotics. I have asked her to increase her p.o. fluids. She will [facility] with [facility] as to whether a culture was sent. Follow-up if there is worsening. Follow-up Disposition:  Return if symptoms worsen or fail to improve.     Pedro Brasher MD Composite Graft Text: The defect edges were debeveled with a #15 scalpel blade.  Given the location of the defect, shape of the defect, the proximity to free margins and the fact the defect was full thickness a composite graft was deemed most appropriate.  The defect was outline and then transferred to the donor site.  A full thickness graft was then excised from the donor site. The graft was then placed in the primary defect, oriented appropriately and then sutured into place.  The secondary defect was then repaired using a primary closure.

## 2023-10-25 LAB
ALBUMIN SERPL-MCNC: 4.3 G/DL (ref 3.9–4.9)
ALP SERPL-CCNC: 116 IU/L (ref 44–121)
ALT SERPL-CCNC: 10 IU/L (ref 0–32)
AST SERPL-CCNC: 18 IU/L (ref 0–40)
BASOPHILS # BLD AUTO: 0 X10E3/UL (ref 0–0.2)
BASOPHILS NFR BLD AUTO: 1 %
BILIRUB DIRECT SERPL-MCNC: <0.1 MG/DL (ref 0–0.4)
BILIRUB SERPL-MCNC: 0.3 MG/DL (ref 0–1.2)
BUN SERPL-MCNC: 15 MG/DL (ref 8–27)
BUN/CREAT SERPL: 21 (ref 12–28)
CALCIUM SERPL-MCNC: 10.1 MG/DL (ref 8.7–10.3)
CHLORIDE SERPL-SCNC: 101 MMOL/L (ref 96–106)
CO2 SERPL-SCNC: 23 MMOL/L (ref 20–29)
CREAT SERPL-MCNC: 0.73 MG/DL (ref 0.57–1)
EGFRCR SERPLBLD CKD-EPI 2021: 89 ML/MIN/1.73
EOSINOPHIL # BLD AUTO: 0.2 X10E3/UL (ref 0–0.4)
EOSINOPHIL NFR BLD AUTO: 3 %
ERYTHROCYTE [DISTWIDTH] IN BLOOD BY AUTOMATED COUNT: 11.3 % (ref 11.7–15.4)
GLUCOSE SERPL-MCNC: 150 MG/DL (ref 70–99)
HCT VFR BLD AUTO: 38.2 % (ref 34–46.6)
HGB BLD-MCNC: 12.8 G/DL (ref 11.1–15.9)
IMM GRANULOCYTES # BLD AUTO: 0 X10E3/UL (ref 0–0.1)
IMM GRANULOCYTES NFR BLD AUTO: 0 %
LYMPHOCYTES # BLD AUTO: 1.2 X10E3/UL (ref 0.7–3.1)
LYMPHOCYTES NFR BLD AUTO: 20 %
MCH RBC QN AUTO: 29.7 PG (ref 26.6–33)
MCHC RBC AUTO-ENTMCNC: 33.5 G/DL (ref 31.5–35.7)
MCV RBC AUTO: 89 FL (ref 79–97)
MONOCYTES # BLD AUTO: 0.4 X10E3/UL (ref 0.1–0.9)
MONOCYTES NFR BLD AUTO: 6 %
NEUTROPHILS # BLD AUTO: 4 X10E3/UL (ref 1.4–7)
NEUTROPHILS NFR BLD AUTO: 70 %
PLATELET # BLD AUTO: 263 X10E3/UL (ref 150–450)
POTASSIUM SERPL-SCNC: 4.1 MMOL/L (ref 3.5–5.2)
PROT SERPL-MCNC: 6.9 G/DL (ref 6–8.5)
RBC # BLD AUTO: 4.31 X10E6/UL (ref 3.77–5.28)
SODIUM SERPL-SCNC: 140 MMOL/L (ref 134–144)
WBC # BLD AUTO: 5.8 X10E3/UL (ref 3.4–10.8)

## 2023-11-01 ENCOUNTER — OFFICE VISIT (OUTPATIENT)
Age: 68
End: 2023-11-01
Payer: MEDICARE

## 2023-11-01 VITALS
TEMPERATURE: 97.6 F | HEART RATE: 92 BPM | DIASTOLIC BLOOD PRESSURE: 68 MMHG | OXYGEN SATURATION: 98 % | WEIGHT: 145.6 LBS | RESPIRATION RATE: 16 BRPM | HEIGHT: 63 IN | SYSTOLIC BLOOD PRESSURE: 133 MMHG | BODY MASS INDEX: 25.8 KG/M2

## 2023-11-01 DIAGNOSIS — K75.4 AUTOIMMUNE HEPATITIS (HCC): Primary | ICD-10-CM

## 2023-11-01 PROCEDURE — 99214 OFFICE O/P EST MOD 30 MIN: CPT | Performed by: NURSE PRACTITIONER

## 2023-11-01 PROCEDURE — 1123F ACP DISCUSS/DSCN MKR DOCD: CPT | Performed by: NURSE PRACTITIONER

## 2023-11-01 RX ORDER — TACROLIMUS 1 MG/G
OINTMENT TOPICAL
COMMUNITY
Start: 2023-10-17

## 2023-11-01 RX ORDER — CLOBETASOL PROPIONATE 0.5 MG/G
OINTMENT TOPICAL
COMMUNITY
Start: 2023-10-17

## 2023-11-01 ASSESSMENT — PATIENT HEALTH QUESTIONNAIRE - PHQ9
SUM OF ALL RESPONSES TO PHQ QUESTIONS 1-9: 0
2. FEELING DOWN, DEPRESSED OR HOPELESS: 0
SUM OF ALL RESPONSES TO PHQ QUESTIONS 1-9: 0
SUM OF ALL RESPONSES TO PHQ9 QUESTIONS 1 & 2: 0
1. LITTLE INTEREST OR PLEASURE IN DOING THINGS: 0

## 2023-12-04 ENCOUNTER — TELEPHONE (OUTPATIENT)
Facility: CLINIC | Age: 68
End: 2023-12-04

## 2023-12-04 ENCOUNTER — OFFICE VISIT (OUTPATIENT)
Facility: CLINIC | Age: 68
End: 2023-12-04
Payer: MEDICARE

## 2023-12-04 VITALS
BODY MASS INDEX: 25.5 KG/M2 | HEART RATE: 92 BPM | TEMPERATURE: 98.4 F | HEIGHT: 63 IN | OXYGEN SATURATION: 98 % | WEIGHT: 143.9 LBS | RESPIRATION RATE: 18 BRPM | DIASTOLIC BLOOD PRESSURE: 70 MMHG | SYSTOLIC BLOOD PRESSURE: 130 MMHG

## 2023-12-04 DIAGNOSIS — Z79.52 LONG TERM SYSTEMIC STEROID USER: ICD-10-CM

## 2023-12-04 DIAGNOSIS — L30.9 ECZEMA, UNSPECIFIED TYPE: ICD-10-CM

## 2023-12-04 DIAGNOSIS — E55.9 VITAMIN D DEFICIENCY: ICD-10-CM

## 2023-12-04 DIAGNOSIS — R73.09 ELEVATED GLUCOSE: ICD-10-CM

## 2023-12-04 DIAGNOSIS — K75.4 AUTOIMMUNE HEPATITIS (HCC): Primary | ICD-10-CM

## 2023-12-04 DIAGNOSIS — E78.5 HYPERLIPIDEMIA, UNSPECIFIED HYPERLIPIDEMIA TYPE: ICD-10-CM

## 2023-12-04 DIAGNOSIS — M85.89 OSTEOPENIA OF MULTIPLE SITES: ICD-10-CM

## 2023-12-04 PROCEDURE — 1123F ACP DISCUSS/DSCN MKR DOCD: CPT | Performed by: NURSE PRACTITIONER

## 2023-12-04 PROCEDURE — 99213 OFFICE O/P EST LOW 20 MIN: CPT | Performed by: NURSE PRACTITIONER

## 2023-12-04 ASSESSMENT — ENCOUNTER SYMPTOMS
EYE DISCHARGE: 0
ANAL BLEEDING: 0
DIARRHEA: 0
EYE REDNESS: 1
BACK PAIN: 0
VOMITING: 0
COUGH: 0
SHORTNESS OF BREATH: 0
FACIAL SWELLING: 0
BLOOD IN STOOL: 0
PHOTOPHOBIA: 0
APNEA: 0
RECTAL PAIN: 0
SORE THROAT: 0
COLOR CHANGE: 0
SINUS PRESSURE: 0
TROUBLE SWALLOWING: 0
WHEEZING: 0
EYE PAIN: 0
CONSTIPATION: 0
NAUSEA: 0
SINUS PAIN: 0
CHOKING: 0
ABDOMINAL PAIN: 0

## 2023-12-04 NOTE — PROGRESS NOTES
Rubin Pryor (: 1955) is a 70 y.o. female here for evaluation of the following chief complaint(s):  Eczema (4 month f/up) and Hyperglycemia         SUBJECTIVE/OBJECTIVE:  HPI    Ms. Cornejo, 69 yo female, here today for general follow-up. Since last office visit she has been weaned from prednisone for autoimmune hepatitis. She reports stopping in August.  She also had UTI. Since stopping prednisone her eczema has greatly flared. Also approximately one week ago-  left eye has been \"blood shot\". This is improving and has an appt tomorrow with ophthalmology. Past medical history includes autoimmune hepatitis, Parkinson's disease, eczema, vitamin D deficiency, long-term steroid use. She has no new concerns today. Hepatology-  Dr. Sabino Tong-  Dr. Kash El   Neurology-  Dr. Baudilio Rick  Ophthalmologist:  Dr. Aiyana Nunn    Diagnosed in 2019 with Parkinson's     No Known Allergies    Current Outpatient Medications   Medication Sig Dispense Refill    clobetasol (TEMOVATE) 0.05 % ointment PLEASE SEE ATTACHED FOR DETAILED DIRECTIONS      tacrolimus (PROTOPIC) 0.1 % ointment PLEASE SEE ATTACHED FOR DETAILED DIRECTIONS      ascorbic acid (VITAMIN C) 1000 MG tablet Take 1 tablet by mouth 2 times daily      MULTIPLE MINERALS PO Take by mouth      azaTHIOprine (IMURAN) 50 MG tablet Take 1 tablet by mouth daily      calcium carb-cholecalciferol 600-10 MG-MCG TABS per tab Take 1 tablet by mouth 2 times daily      carbidopa-levodopa (SINEMET)  MG per tablet Take 1.5 tablets by mouth 3 times daily       No current facility-administered medications for this visit.         Past Medical History:   Diagnosis Date    Allergic rhinitis 2017    Autoimmune hepatitis ([address]) 10/2022    10/22 - Biopsy proven -->  - Imuran, Prednisone; follows with Dr. Waldo Travis    Diverticulosis 2022    CSP finding    Eczema     Prev numerous dermatology referrals; currently under care with

## 2023-12-05 LAB
25(OH)D3 SERPL-MCNC: 49.1 NG/ML (ref 30–100)
ALBUMIN SERPL-MCNC: 4.2 G/DL (ref 3.5–5)
ALBUMIN/GLOB SERPL: 1.3 (ref 1.1–2.2)
ALP SERPL-CCNC: 118 U/L (ref 45–117)
ALT SERPL-CCNC: 7 U/L (ref 12–78)
ANION GAP SERPL CALC-SCNC: 7 MMOL/L (ref 5–15)
AST SERPL-CCNC: 18 U/L (ref 15–37)
BILIRUB DIRECT SERPL-MCNC: 0.2 MG/DL (ref 0–0.2)
BILIRUB SERPL-MCNC: 0.5 MG/DL (ref 0.2–1)
BUN SERPL-MCNC: 12 MG/DL (ref 6–20)
BUN/CREAT SERPL: 18 (ref 12–20)
CALCIUM SERPL-MCNC: 9.9 MG/DL (ref 8.5–10.1)
CHLORIDE SERPL-SCNC: 106 MMOL/L (ref 97–108)
CHOLEST SERPL-MCNC: 205 MG/DL
CO2 SERPL-SCNC: 27 MMOL/L (ref 21–32)
CREAT SERPL-MCNC: 0.67 MG/DL (ref 0.55–1.02)
EST. AVERAGE GLUCOSE BLD GHB EST-MCNC: 111 MG/DL
GLOBULIN SER CALC-MCNC: 3.3 G/DL (ref 2–4)
GLUCOSE SERPL-MCNC: 108 MG/DL (ref 65–100)
HBA1C MFR BLD: 5.5 % (ref 4–5.6)
HDLC SERPL-MCNC: 82 MG/DL
HDLC SERPL: 2.5 (ref 0–5)
LDLC SERPL CALC-MCNC: 106.2 MG/DL (ref 0–100)
POTASSIUM SERPL-SCNC: 4.1 MMOL/L (ref 3.5–5.1)
PROT SERPL-MCNC: 7.5 G/DL (ref 6.4–8.2)
SODIUM SERPL-SCNC: 140 MMOL/L (ref 136–145)
TRIGL SERPL-MCNC: 84 MG/DL
VLDLC SERPL CALC-MCNC: 16.8 MG/DL

## 2024-01-09 RX ORDER — AZATHIOPRINE 50 MG/1
50 TABLET ORAL DAILY
Qty: 90 TABLET | Refills: 3 | Status: SHIPPED | OUTPATIENT
Start: 2024-02-15

## 2024-04-25 LAB
BASOPHILS # BLD AUTO: 0.1 X10E3/UL (ref 0–0.2)
BASOPHILS NFR BLD AUTO: 1 %
EOSINOPHIL # BLD AUTO: 0.1 X10E3/UL (ref 0–0.4)
EOSINOPHIL NFR BLD AUTO: 1 %
ERYTHROCYTE [DISTWIDTH] IN BLOOD BY AUTOMATED COUNT: 12.1 % (ref 11.7–15.4)
HCT VFR BLD AUTO: 39.4 % (ref 34–46.6)
HGB BLD-MCNC: 13.3 G/DL (ref 11.1–15.9)
IMM GRANULOCYTES # BLD AUTO: 0 X10E3/UL (ref 0–0.1)
IMM GRANULOCYTES NFR BLD AUTO: 0 %
LYMPHOCYTES # BLD AUTO: 1 X10E3/UL (ref 0.7–3.1)
LYMPHOCYTES NFR BLD AUTO: 10 %
MCH RBC QN AUTO: 30 PG (ref 26.6–33)
MCHC RBC AUTO-ENTMCNC: 33.8 G/DL (ref 31.5–35.7)
MCV RBC AUTO: 89 FL (ref 79–97)
MONOCYTES # BLD AUTO: 0.6 X10E3/UL (ref 0.1–0.9)
MONOCYTES NFR BLD AUTO: 6 %
NEUTROPHILS # BLD AUTO: 8.1 X10E3/UL (ref 1.4–7)
NEUTROPHILS NFR BLD AUTO: 82 %
PLATELET # BLD AUTO: 258 X10E3/UL (ref 150–450)
RBC # BLD AUTO: 4.44 X10E6/UL (ref 3.77–5.28)
WBC # BLD AUTO: 9.9 X10E3/UL (ref 3.4–10.8)

## 2024-04-26 LAB
ALBUMIN SERPL-MCNC: 4.3 G/DL (ref 3.9–4.9)
ALP SERPL-CCNC: 125 IU/L (ref 44–121)
ALT SERPL-CCNC: 7 IU/L (ref 0–32)
AST SERPL-CCNC: 18 IU/L (ref 0–40)
BILIRUB DIRECT SERPL-MCNC: 0.15 MG/DL (ref 0–0.4)
BILIRUB SERPL-MCNC: 0.5 MG/DL (ref 0–1.2)
BUN SERPL-MCNC: 12 MG/DL (ref 8–27)
BUN/CREAT SERPL: 17 (ref 12–28)
CALCIUM SERPL-MCNC: 9.6 MG/DL (ref 8.7–10.3)
CHLORIDE SERPL-SCNC: 100 MMOL/L (ref 96–106)
CO2 SERPL-SCNC: 24 MMOL/L (ref 20–29)
CREAT SERPL-MCNC: 0.69 MG/DL (ref 0.57–1)
EGFRCR SERPLBLD CKD-EPI 2021: 94 ML/MIN/1.73
GLUCOSE SERPL-MCNC: 159 MG/DL (ref 70–99)
POTASSIUM SERPL-SCNC: 4.4 MMOL/L (ref 3.5–5.2)
PROT SERPL-MCNC: 6.9 G/DL (ref 6–8.5)
SODIUM SERPL-SCNC: 139 MMOL/L (ref 134–144)

## 2024-05-03 ENCOUNTER — TELEPHONE (OUTPATIENT)
Age: 69
End: 2024-05-03

## 2024-05-03 ENCOUNTER — OFFICE VISIT (OUTPATIENT)
Age: 69
End: 2024-05-03
Payer: MEDICARE

## 2024-05-03 VITALS
HEIGHT: 63 IN | RESPIRATION RATE: 16 BRPM | TEMPERATURE: 96.6 F | SYSTOLIC BLOOD PRESSURE: 139 MMHG | DIASTOLIC BLOOD PRESSURE: 80 MMHG | WEIGHT: 139.6 LBS | OXYGEN SATURATION: 95 % | HEART RATE: 114 BPM | BODY MASS INDEX: 24.73 KG/M2

## 2024-05-03 DIAGNOSIS — K75.4 AUTOIMMUNE HEPATITIS (HCC): ICD-10-CM

## 2024-05-03 DIAGNOSIS — Z76.89 ENCOUNTER TO ESTABLISH CARE: Primary | ICD-10-CM

## 2024-05-03 PROCEDURE — 99214 OFFICE O/P EST MOD 30 MIN: CPT | Performed by: NURSE PRACTITIONER

## 2024-05-03 PROCEDURE — 1123F ACP DISCUSS/DSCN MKR DOCD: CPT | Performed by: NURSE PRACTITIONER

## 2024-05-03 RX ORDER — TRAZODONE HYDROCHLORIDE 50 MG/1
50 TABLET ORAL NIGHTLY
Qty: 30 TABLET | Refills: 0 | Status: SHIPPED | OUTPATIENT
Start: 2024-05-03

## 2024-05-03 RX ORDER — FLUOXETINE 10 MG/1
10 CAPSULE ORAL DAILY
Qty: 30 CAPSULE | Refills: 3 | Status: SHIPPED | OUTPATIENT
Start: 2024-05-03 | End: 2024-05-03

## 2024-05-03 RX ORDER — FLUOXETINE 10 MG/1
10 CAPSULE ORAL EVERY MORNING
Qty: 30 CAPSULE | Refills: 3 | Status: SHIPPED | OUTPATIENT
Start: 2024-05-03

## 2024-05-03 ASSESSMENT — PATIENT HEALTH QUESTIONNAIRE - PHQ9
SUM OF ALL RESPONSES TO PHQ QUESTIONS 1-9: 0
2. FEELING DOWN, DEPRESSED OR HOPELESS: NOT AT ALL
SUM OF ALL RESPONSES TO PHQ QUESTIONS 1-9: 0
1. LITTLE INTEREST OR PLEASURE IN DOING THINGS: NOT AT ALL
SUM OF ALL RESPONSES TO PHQ QUESTIONS 1-9: 0
SUM OF ALL RESPONSES TO PHQ QUESTIONS 1-9: 0
SUM OF ALL RESPONSES TO PHQ9 QUESTIONS 1 & 2: 0
DEPRESSION UNABLE TO ASSESS: FUNCTIONAL CAPACITY MOTIVATION LIMITS ACCURACY

## 2024-05-03 NOTE — TELEPHONE ENCOUNTER
We will trial 10 mg fluoxetine in the morning to help with anxiety.  The patient will also try 50 mg trazodone nightly to help with sleep.  She is aware to space this dosing with the fluoxetine.  Rx sent into the pharmacy.

## 2024-05-03 NOTE — PROGRESS NOTES
[facility]      Karel Avina MD, FACP, FACG, FAASLD      ISRRAEL Torrez-RALPH Simmons, [facility]-BC   Annie Westfall, [facility]   Dyana Jones, FNP-C   Bro Madden, FNP-C    Rhea Will, [facility]    at [facility]    [address], [address]    [place]  [address] [phone]    FAX: [phone]   [facility]    at [facility]    [address], [address]    [place]  [address] [phone]    FAX: [phone]       Patient Care Team:  No, Pcp as PCP - Francisco Chapman MD (Neurology)      Patient Active Problem List   Diagnosis    Osteopenia    Parkinson's disease (HCC)    Eczema    Vitamin D deficiency    Autoimmune hepatitis (HCC)    Elevated glucose    Long term systemic steroid user       Sanjuana Cornejo is being seen at [facility] for management of autoimmune hepatitis.  The active problem list, all pertinent past medical history, medications, liver histology, radiologic findings and laboratory findings related to the liver disorder were reviewed and discussed with the patient.      The patient is a 68 y.o. female who was found to have elevated liver transaminases and alkaline phosphatase in 5/2022.    Serologic evaluation for markers of chronic liver disease was positive for ASMA.     The most recent imaging of the liver was Ultrasound performed in 5/2022.  Results suggest that the liver is normal.      A liver biopsy in 10/2022 demonstrated mild portal inflammation, and portal fibrosis that is greater than would be expected given the degree of inflammation.    Fibroscan performed 12/2022 demonstrated 13.2 kPa which correlates with stage 3 fibrosis.    MRCP performed 12/2022 was negative for bile duct disease.     The patient was started on 20 mg

## 2024-05-03 NOTE — PROGRESS NOTES
Identified pt with two pt identifiers(name and ). Reviewed record in preparation for visit and have obtained necessary documentation.  Vitals:    24 1303   BP: 139/80   Site: Left Upper Arm   Position: Sitting   Cuff Size: Medium Adult   Pulse: (!) 114   Resp: 16   Temp: (!) 96.6 °F (35.9 °C)   TempSrc: Temporal   SpO2: 95%   Weight: 63.3 kg (139 lb 9.6 oz)   Height: 1.6 m (5' 3\")        Health Maintenance Review: Patient reminded of \"due or due soon\" health maintenance. I have asked the patient to contact his/her primary care provider (PCP) for follow-up on his/her health maintenance.    Coordination of Care Questionnaire:  :   1) Have you been to an emergency room, urgent care, or hospitalized since your last visit?  If yes, where when, and reason for visit? no       2. Have seen or consulted any other health care provider since your last visit?   If yes, where when, and reason for visit?  No      Patient is accompanied by daughter I have received verbal consent from Sanjuana Cornejo to discuss any/all medical information while they are present in the room.

## 2024-05-03 NOTE — TELEPHONE ENCOUNTER
Patient called requesting to speak with Annie regarding medication. States she changed her mind on a sleep medication she spoke with Annie about. Would like a call back.

## 2024-05-03 NOTE — PROGRESS NOTES
1) Have you been to an emergency room, urgent care, or hospitalized since your last visit?  If yes, where when, and reason for visit? no         2. Have seen or consulted any other health care provider since your last visit?   If yes, where when, and reason for visit?  No        Patient is accompanied by daughter I have received verbal consent from Sanjuana Cornejo to discuss any/all medical information while they are present in the room.     Health Maintenance Due   Topic Date Due    COVID-19 Vaccine (1) Never done    Pneumococcal 65+ years Vaccine (1 of 2 - PCV) Never done    DTaP/Tdap/Td vaccine (1 - Tdap) Never done    Shingles vaccine (1 of 2) Never done    Respiratory Syncytial Virus (RSV) Pregnant or age 60 yrs+ (1 - 1-dose 60+ series) Never done    Annual Wellness Visit (Medicare Advantage)  01/01/2024             No data to display                 Failed to redirect to the Timeline version of the [email or web address] SmartLink.    Failed to redirect to the Timeline version of the [email or web address] SmartLink.

## 2024-05-28 RX ORDER — TRAZODONE HYDROCHLORIDE 50 MG/1
50 TABLET ORAL NIGHTLY
Qty: 90 TABLET | Refills: 1 | Status: SHIPPED | OUTPATIENT
Start: 2024-05-28

## 2024-11-06 LAB
ALBUMIN SERPL-MCNC: 4.3 G/DL (ref 3.9–4.9)
ALP SERPL-CCNC: 124 IU/L (ref 44–121)
ALT SERPL-CCNC: 5 IU/L (ref 0–32)
AST SERPL-CCNC: 19 IU/L (ref 0–40)
BASOPHILS # BLD AUTO: 0.1 X10E3/UL (ref 0–0.2)
BASOPHILS NFR BLD AUTO: 1 %
BILIRUB SERPL-MCNC: 0.3 MG/DL (ref 0–1.2)
BUN SERPL-MCNC: 13 MG/DL (ref 8–27)
BUN/CREAT SERPL: 20 (ref 12–28)
CALCIUM SERPL-MCNC: 9.8 MG/DL (ref 8.7–10.3)
CHLORIDE SERPL-SCNC: 99 MMOL/L (ref 96–106)
CO2 SERPL-SCNC: 27 MMOL/L (ref 20–29)
CREAT SERPL-MCNC: 0.66 MG/DL (ref 0.57–1)
EGFRCR SERPLBLD CKD-EPI 2021: 94 ML/MIN/1.73
EOSINOPHIL # BLD AUTO: 0.2 X10E3/UL (ref 0–0.4)
EOSINOPHIL NFR BLD AUTO: 2 %
ERYTHROCYTE [DISTWIDTH] IN BLOOD BY AUTOMATED COUNT: 12 % (ref 11.7–15.4)
GLOBULIN SER CALC-MCNC: 2.2 G/DL (ref 1.5–4.5)
GLUCOSE SERPL-MCNC: 149 MG/DL (ref 70–99)
HCT VFR BLD AUTO: 38.3 % (ref 34–46.6)
HGB BLD-MCNC: 13.2 G/DL (ref 11.1–15.9)
IMM GRANULOCYTES # BLD AUTO: 0 X10E3/UL (ref 0–0.1)
IMM GRANULOCYTES NFR BLD AUTO: 0 %
LYMPHOCYTES # BLD AUTO: 1.2 X10E3/UL (ref 0.7–3.1)
LYMPHOCYTES NFR BLD AUTO: 17 %
MCH RBC QN AUTO: 30.6 PG (ref 26.6–33)
MCHC RBC AUTO-ENTMCNC: 34.5 G/DL (ref 31.5–35.7)
MCV RBC AUTO: 89 FL (ref 79–97)
MONOCYTES # BLD AUTO: 0.3 X10E3/UL (ref 0.1–0.9)
MONOCYTES NFR BLD AUTO: 5 %
NEUTROPHILS # BLD AUTO: 5.3 X10E3/UL (ref 1.4–7)
NEUTROPHILS NFR BLD AUTO: 75 %
PLATELET # BLD AUTO: 256 X10E3/UL (ref 150–450)
POTASSIUM SERPL-SCNC: 4.3 MMOL/L (ref 3.5–5.2)
PROT SERPL-MCNC: 6.5 G/DL (ref 6–8.5)
RBC # BLD AUTO: 4.31 X10E6/UL (ref 3.77–5.28)
SODIUM SERPL-SCNC: 139 MMOL/L (ref 134–144)
WBC # BLD AUTO: 7.1 X10E3/UL (ref 3.4–10.8)

## 2024-11-11 ENCOUNTER — OFFICE VISIT (OUTPATIENT)
Age: 69
End: 2024-11-11
Payer: MEDICARE

## 2024-11-11 VITALS
HEART RATE: 83 BPM | WEIGHT: 143.8 LBS | RESPIRATION RATE: 16 BRPM | BODY MASS INDEX: 25.48 KG/M2 | DIASTOLIC BLOOD PRESSURE: 76 MMHG | TEMPERATURE: 97.7 F | SYSTOLIC BLOOD PRESSURE: 135 MMHG | HEIGHT: 63 IN | OXYGEN SATURATION: 98 %

## 2024-11-11 DIAGNOSIS — K75.4 AUTOIMMUNE HEPATITIS (HCC): Primary | ICD-10-CM

## 2024-11-11 PROCEDURE — 1090F PRES/ABSN URINE INCON ASSESS: CPT | Performed by: NURSE PRACTITIONER

## 2024-11-11 PROCEDURE — 3017F COLORECTAL CA SCREEN DOC REV: CPT | Performed by: NURSE PRACTITIONER

## 2024-11-11 PROCEDURE — 1159F MED LIST DOCD IN RCRD: CPT | Performed by: NURSE PRACTITIONER

## 2024-11-11 PROCEDURE — 99214 OFFICE O/P EST MOD 30 MIN: CPT | Performed by: NURSE PRACTITIONER

## 2024-11-11 PROCEDURE — 1123F ACP DISCUSS/DSCN MKR DOCD: CPT | Performed by: NURSE PRACTITIONER

## 2024-11-11 PROCEDURE — 1126F AMNT PAIN NOTED NONE PRSNT: CPT | Performed by: NURSE PRACTITIONER

## 2024-11-11 PROCEDURE — G8484 FLU IMMUNIZE NO ADMIN: HCPCS | Performed by: NURSE PRACTITIONER

## 2024-11-11 PROCEDURE — G8419 CALC BMI OUT NRM PARAM NOF/U: HCPCS | Performed by: NURSE PRACTITIONER

## 2024-11-11 PROCEDURE — 91200 LIVER ELASTOGRAPHY: CPT | Performed by: NURSE PRACTITIONER

## 2024-11-11 PROCEDURE — 1036F TOBACCO NON-USER: CPT | Performed by: NURSE PRACTITIONER

## 2024-11-11 PROCEDURE — G8427 DOCREV CUR MEDS BY ELIG CLIN: HCPCS | Performed by: NURSE PRACTITIONER

## 2024-11-11 PROCEDURE — 1160F RVW MEDS BY RX/DR IN RCRD: CPT | Performed by: NURSE PRACTITIONER

## 2024-11-11 PROCEDURE — G8400 PT W/DXA NO RESULTS DOC: HCPCS | Performed by: NURSE PRACTITIONER

## 2024-11-11 NOTE — PROGRESS NOTES
Sanjuana Cornejo is a 69 y.o. female    Chief Complaint   Patient presents with    6 Month Follow-Up     Fibroscan      Vitals:    11/11/24 1306   BP: 135/76   Pulse: 83   Resp: 16   Temp: 97.7 °F (36.5 °C)   SpO2: 98%         Health Maintenance Due   Topic Date Due    COVID-19 Vaccine (1) Never done    Pneumococcal 65+ years Vaccine (1 of 2 - PCV) Never done    DTaP/Tdap/Td vaccine (1 - Tdap) Never done    Shingles vaccine (1 of 2) Never done    Respiratory Syncytial Virus (RSV) Pregnant or age 60 yrs+ (1 - 1-dose 60+ series) Never done    Annual Wellness Visit (Medicare Advantage)  01/01/2024    Flu vaccine (1) 08/01/2024    Breast cancer screen  11/30/2024    A1C test (Diabetic or Prediabetic)  12/04/2024         \"Have you been to the ER, urgent care clinic since your last visit?  Hospitalized since your last visit?\"    No     “Have you seen or consulted any other health care providers outside of [facility] since your last visit?”    No              
significant hepatic steatosis.  7/2023.  FibroScan performed at [facility]. EkPa was 5.4.  IQR/med 27%.  . The results suggested a fibrosis level of F0.  The CAP score suggests there is no significant hepatic steatosis.  11/2024.  FibroScan performed at [facility]. EkPa was 4.2.  IQR/med 15%.  .   The results suggested a fibrosis level of F0. The CAP score suggests there is no significant hepatic steatosis.    ENDOSCOPIC PROCEDURES:  Not available or performed    RADIOLOGY:  5/2022.  Ultrasound of liver.  Normal appearing liver.  No liver mass lesions.  12/2022. MRI Abd/MRCP. Negative for PSC. Moderate gallstone, numerous tiny gallstones.     OTHER TESTING:  Not available or performed    FOLLOW-UP:  All of the issues listed above in the Assessment and Plan were discussed with the patient.  All questions were answered.  The patient expressed a clear understanding of the above.     Follow-up [facility] in 6 months for routine monitoring.  Labs to be drawn before time of next visit.      JOSUE Ching-PAULIE  [address], [address]  [place]  [address] [phone]  [facility]

## 2025-01-02 RX ORDER — AZATHIOPRINE 50 MG/1
50 TABLET ORAL DAILY
Qty: 90 TABLET | Refills: 3 | Status: SHIPPED | OUTPATIENT
Start: 2025-01-02

## 2025-05-07 ENCOUNTER — RESULTS FOLLOW-UP (OUTPATIENT)
Age: 70
End: 2025-05-07

## 2025-05-07 LAB
ALBUMIN SERPL-MCNC: 4.3 G/DL (ref 3.9–4.9)
ALP SERPL-CCNC: 137 IU/L (ref 44–121)
ALT SERPL-CCNC: 6 IU/L (ref 0–32)
AST SERPL-CCNC: 19 IU/L (ref 0–40)
BASOPHILS # BLD AUTO: 0 X10E3/UL (ref 0–0.2)
BASOPHILS NFR BLD AUTO: 1 %
BILIRUB SERPL-MCNC: <0.2 MG/DL (ref 0–1.2)
BUN SERPL-MCNC: 14 MG/DL (ref 8–27)
BUN/CREAT SERPL: 20 (ref 12–28)
CALCIUM SERPL-MCNC: 9.9 MG/DL (ref 8.7–10.3)
CHLORIDE SERPL-SCNC: 101 MMOL/L (ref 96–106)
CO2 SERPL-SCNC: 24 MMOL/L (ref 20–29)
CREAT SERPL-MCNC: 0.7 MG/DL (ref 0.57–1)
EGFRCR SERPLBLD CKD-EPI 2021: 93 ML/MIN/1.73
EOSINOPHIL # BLD AUTO: 0.1 X10E3/UL (ref 0–0.4)
EOSINOPHIL NFR BLD AUTO: 2 %
ERYTHROCYTE [DISTWIDTH] IN BLOOD BY AUTOMATED COUNT: 12.1 % (ref 11.7–15.4)
GLOBULIN SER CALC-MCNC: 2.7 G/DL (ref 1.5–4.5)
GLUCOSE SERPL-MCNC: 164 MG/DL (ref 70–99)
HCT VFR BLD AUTO: 39.3 % (ref 34–46.6)
HGB BLD-MCNC: 12.9 G/DL (ref 11.1–15.9)
IMM GRANULOCYTES # BLD AUTO: 0 X10E3/UL (ref 0–0.1)
IMM GRANULOCYTES NFR BLD AUTO: 0 %
LYMPHOCYTES # BLD AUTO: 1.3 X10E3/UL (ref 0.7–3.1)
LYMPHOCYTES NFR BLD AUTO: 18 %
MCH RBC QN AUTO: 29.7 PG (ref 26.6–33)
MCHC RBC AUTO-ENTMCNC: 32.8 G/DL (ref 31.5–35.7)
MCV RBC AUTO: 90 FL (ref 79–97)
MONOCYTES # BLD AUTO: 0.4 X10E3/UL (ref 0.1–0.9)
MONOCYTES NFR BLD AUTO: 6 %
NEUTROPHILS # BLD AUTO: 5.1 X10E3/UL (ref 1.4–7)
NEUTROPHILS NFR BLD AUTO: 73 %
PLATELET # BLD AUTO: 264 X10E3/UL (ref 150–450)
POTASSIUM SERPL-SCNC: 4.1 MMOL/L (ref 3.5–5.2)
PROT SERPL-MCNC: 7 G/DL (ref 6–8.5)
RBC # BLD AUTO: 4.35 X10E6/UL (ref 3.77–5.28)
SODIUM SERPL-SCNC: 140 MMOL/L (ref 134–144)
WBC # BLD AUTO: 7 X10E3/UL (ref 3.4–10.8)

## 2025-05-12 ENCOUNTER — OFFICE VISIT (OUTPATIENT)
Age: 70
End: 2025-05-12
Payer: MEDICARE

## 2025-05-12 VITALS
HEIGHT: 63 IN | SYSTOLIC BLOOD PRESSURE: 143 MMHG | HEART RATE: 87 BPM | TEMPERATURE: 98.6 F | WEIGHT: 149.2 LBS | DIASTOLIC BLOOD PRESSURE: 65 MMHG | OXYGEN SATURATION: 96 % | BODY MASS INDEX: 26.44 KG/M2

## 2025-05-12 DIAGNOSIS — R74.8 ELEVATED ALKALINE PHOSPHATASE LEVEL: Primary | ICD-10-CM

## 2025-05-12 DIAGNOSIS — K75.4 AUTOIMMUNE HEPATITIS (HCC): ICD-10-CM

## 2025-05-12 DIAGNOSIS — R79.9 ABNORMAL FINDING OF BLOOD CHEMISTRY, UNSPECIFIED: ICD-10-CM

## 2025-05-12 PROCEDURE — 3017F COLORECTAL CA SCREEN DOC REV: CPT | Performed by: NURSE PRACTITIONER

## 2025-05-12 PROCEDURE — G8400 PT W/DXA NO RESULTS DOC: HCPCS | Performed by: NURSE PRACTITIONER

## 2025-05-12 PROCEDURE — 1159F MED LIST DOCD IN RCRD: CPT | Performed by: NURSE PRACTITIONER

## 2025-05-12 PROCEDURE — 1123F ACP DISCUSS/DSCN MKR DOCD: CPT | Performed by: NURSE PRACTITIONER

## 2025-05-12 PROCEDURE — 1036F TOBACCO NON-USER: CPT | Performed by: NURSE PRACTITIONER

## 2025-05-12 PROCEDURE — 99214 OFFICE O/P EST MOD 30 MIN: CPT | Performed by: NURSE PRACTITIONER

## 2025-05-12 PROCEDURE — 1090F PRES/ABSN URINE INCON ASSESS: CPT | Performed by: NURSE PRACTITIONER

## 2025-05-12 PROCEDURE — G8419 CALC BMI OUT NRM PARAM NOF/U: HCPCS | Performed by: NURSE PRACTITIONER

## 2025-05-12 PROCEDURE — 1160F RVW MEDS BY RX/DR IN RCRD: CPT | Performed by: NURSE PRACTITIONER

## 2025-05-12 PROCEDURE — G8427 DOCREV CUR MEDS BY ELIG CLIN: HCPCS | Performed by: NURSE PRACTITIONER

## 2025-05-12 ASSESSMENT — PATIENT HEALTH QUESTIONNAIRE - PHQ9
SUM OF ALL RESPONSES TO PHQ QUESTIONS 1-9: 0
SUM OF ALL RESPONSES TO PHQ QUESTIONS 1-9: 0
1. LITTLE INTEREST OR PLEASURE IN DOING THINGS: NOT AT ALL
2. FEELING DOWN, DEPRESSED OR HOPELESS: NOT AT ALL
SUM OF ALL RESPONSES TO PHQ QUESTIONS 1-9: 0
DEPRESSION UNABLE TO ASSESS: FUNCTIONAL CAPACITY MOTIVATION LIMITS ACCURACY
SUM OF ALL RESPONSES TO PHQ QUESTIONS 1-9: 0

## 2025-05-12 ASSESSMENT — ANXIETY QUESTIONNAIRES
2. NOT BEING ABLE TO STOP OR CONTROL WORRYING: NOT AT ALL
3. WORRYING TOO MUCH ABOUT DIFFERENT THINGS: NOT AT ALL
6. BECOMING EASILY ANNOYED OR IRRITABLE: NOT AT ALL
GAD7 TOTAL SCORE: 0
1. FEELING NERVOUS, ANXIOUS, OR ON EDGE: NOT AT ALL
4. TROUBLE RELAXING: NOT AT ALL
IF YOU CHECKED OFF ANY PROBLEMS ON THIS QUESTIONNAIRE, HOW DIFFICULT HAVE THESE PROBLEMS MADE IT FOR YOU TO DO YOUR WORK, TAKE CARE OF THINGS AT HOME, OR GET ALONG WITH OTHER PEOPLE: NOT DIFFICULT AT ALL
7. FEELING AFRAID AS IF SOMETHING AWFUL MIGHT HAPPEN: NOT AT ALL
5. BEING SO RESTLESS THAT IT IS HARD TO SIT STILL: NOT AT ALL

## 2025-05-12 NOTE — PROGRESS NOTES
[facility]     Karel Avina MD, FACP, ANUG, FAASLD   MD Crystal Krueger, MARTHA Simmons, [facility]   Annie Blairsabrinakelsey, [facility]  Bro Madden, FNP-C   Rhea Suman, [facility]         Liver [facility]   [address], [address]   [place]  [address] [phone]   FAX: [phone]  [facility]   [address], [address]   [place]  [address] [phone]   FAX: [phone]           Patient Care Team:  Chandu Boothe MD as PCP - General (Family Medicine)  Francisco Millan MD (Neurology)      Patient Active Problem List   Diagnosis    Osteopenia    Parkinson's disease (HCC)    Eczema    Vitamin D deficiency    Autoimmune hepatitis (HCC)    Elevated glucose    Long term systemic steroid user       Sanjuana Cornejo is being seen at [facility] for management of autoimmune hepatitis.  The active problem list, all pertinent past medical history, medications, liver histology, radiologic findings and laboratory findings related to the liver disorder were reviewed and discussed with the patient.      The patient is a 69 y.o. female who was found to have elevated liver transaminases and alkaline phosphatase in 5/2022.    Serologic evaluation for markers of chronic liver disease was positive for ASMA.     The most recent imaging of the liver was Ultrasound performed in 5/2022.  Results suggest that the liver is normal.      A liver biopsy in 10/2022 demonstrated mild portal inflammation, and portal fibrosis that is greater than would be expected given the degree of inflammation.    Fibroscan performed 12/2022 demonstrated 13.2 kPa which correlates with stage 3 fibrosis. Repeat Fibroscan in 7/2023 demonstrated 5.4 kPa consistent with no fibrosis.     MRCP performed

## 2025-05-12 NOTE — PROGRESS NOTES
Chief Complaint   Patient presents with    Follow-up     N/A     Vitals:    05/12/25 1306   BP: (!) 143/65   BP Site: Right Upper Arm   Patient Position: Sitting   Pulse: 87   Temp: 98.6 °F (37 °C)   TempSrc: Temporal   SpO2: 96%   Weight: 67.7 kg (149 lb 3.2 oz)   Height: 1.6 m (5' 3\")     .  \"Have you been to the ER, urgent care clinic since your last visit?  Hospitalized since your last visit?\"    NO    “Have you seen or consulted any other health care providers outside of [facility] since your last visit?”    NO    Have you had a mammogram?”   YES - Where: last year in dec Nurse/CMA to request most recent records if not in the chart    Date of last Mammogram: 11/30/2022             Click Here for Release of Records Request

## (undated) LAB
ALBUMIN SERPL-MCNC: 3.2 G/DL (ref 3.5–5)
ALBUMIN/GLOB SERPL: 1 (ref 1.1–2.2)
ALP SERPL-CCNC: 113 U/L (ref 45–117)
ALT SERPL-CCNC: 9 U/L (ref 12–78)
ANION GAP SERPL CALC-SCNC: 5 MMOL/L (ref 5–15)
AST SERPL-CCNC: 19 U/L (ref 15–37)
BASOPHILS # BLD: 0.1 K/UL (ref 0–0.1)
BASOPHILS NFR BLD: 1 % (ref 0–1)
BILIRUB SERPL-MCNC: 0.6 MG/DL (ref 0.2–1)
BUN SERPL-MCNC: 16 MG/DL (ref 6–20)
BUN/CREAT SERPL: 23 (ref 12–20)
CALCIUM SERPL-MCNC: 9.4 MG/DL (ref 8.5–10.1)
CHLORIDE SERPL-SCNC: 100 MMOL/L (ref 97–108)
CO2 SERPL-SCNC: 31 MMOL/L (ref 21–32)
CREAT SERPL-MCNC: 0.7 MG/DL (ref 0.55–1.02)
DIFFERENTIAL METHOD BLD: ABNORMAL
EOSINOPHIL # BLD: 0.1 K/UL (ref 0–0.4)
EOSINOPHIL NFR BLD: 1 % (ref 0–7)
ERYTHROCYTE [DISTWIDTH] IN BLOOD BY AUTOMATED COUNT: 12.2 % (ref 11.5–14.5)
EST. AVERAGE GLUCOSE BLD GHB EST-MCNC: 157 MG/DL
FERRITIN SERPL-MCNC: 367 NG/ML (ref 8–252)
GLOBULIN SER CALC-MCNC: 3.2 G/DL (ref 2–4)
GLUCOSE SERPL-MCNC: 146 MG/DL (ref 65–100)
HBA1C MFR BLD: 7.1 % (ref 4–5.6)
HCT VFR BLD AUTO: 39.2 % (ref 35–47)
HGB BLD-MCNC: 12.7 G/DL (ref 11.5–16)
IMM GRANULOCYTES # BLD AUTO: 0 K/UL
IMM GRANULOCYTES NFR BLD AUTO: 0 %
IRON SATN MFR SERPL: 20 % (ref 20–50)
IRON SERPL-MCNC: 52 UG/DL (ref 35–150)
LYMPHOCYTES # BLD: 0.8 K/UL (ref 0.8–3.5)
LYMPHOCYTES NFR BLD: 6 % (ref 12–49)
MCH RBC QN AUTO: 31 PG (ref 26–34)
MCHC RBC AUTO-ENTMCNC: 32.4 G/DL (ref 30–36.5)
MCV RBC AUTO: 95.6 FL (ref 80–99)
MONOCYTES # BLD: 0.5 K/UL (ref 0–1)
MONOCYTES NFR BLD: 4 % (ref 5–13)
NEUTS SEG # BLD: 11.1 K/UL (ref 1.8–8)
NEUTS SEG NFR BLD: 88 % (ref 32–75)
NRBC # BLD: 0 K/UL (ref 0–0.01)
NRBC BLD-RTO: 0 PER 100 WBC
PLATELET # BLD AUTO: 420 K/UL (ref 150–400)
PMV BLD AUTO: 9.8 FL (ref 8.9–12.9)
POTASSIUM SERPL-SCNC: 3.8 MMOL/L (ref 3.5–5.1)
PROT SERPL-MCNC: 6.4 G/DL (ref 6.4–8.2)
RBC # BLD AUTO: 4.1 M/UL (ref 3.8–5.2)
RBC MORPH BLD: ABNORMAL
SODIUM SERPL-SCNC: 136 MMOL/L (ref 136–145)
TIBC SERPL-MCNC: 256 UG/DL (ref 250–450)
WBC # BLD AUTO: 12.6 K/UL (ref 3.6–11)

## (undated) DEVICE — ELECTRODE,RADIOTRANSLUCENT,FOAM,5PK: Brand: MEDLINE

## (undated) DEVICE — TRAY BX SFT TISS W/ RUL ALC PREP PD FEN DRP TWL LUERLOCK

## (undated) DEVICE — HYPODERMIC SAFETY NEEDLE: Brand: MAGELLAN

## (undated) DEVICE — MAX-CORE® DISPOSABLE CORE BIOPSY INSTRUMENT, 16G X 16CM: Brand: MAX-CORE

## (undated) DEVICE — SYR 3ML LL TIP 1/10ML GRAD --

## (undated) DEVICE — Device

## (undated) DEVICE — SET ADMIN 16ML TBNG L100IN 2 Y INJ SITE IV PIGGY BK DISP

## (undated) DEVICE — SOLIDIFIER FLD 2OZ 1500CC N DISINF IN BTL DISP SAFESORB

## (undated) DEVICE — SYR 10ML LUER LOK 1/5ML GRAD --

## (undated) DEVICE — 1200 GUARD II KIT W/5MM TUBE W/O VAC TUBE: Brand: GUARDIAN

## (undated) DEVICE — NEONATAL-ADULT SPO2 SENSOR: Brand: NELLCOR

## (undated) DEVICE — BASIN EMSIS 16OZ GRAPHITE PLAS KID SHP MOLD GRAD FOR ORAL

## (undated) DEVICE — CATH IV AUTOGRD BC PNK 20GA 25 -- INSYTE

## (undated) DEVICE — Z DISCONTINUED PER MEDLINE LINE GAS SAMPLING O2/CO2 LNG AD 13 FT NSL W/ TBNG FILTERLINE

## (undated) DEVICE — TOWEL 4 PLY TISS 19X30 SUE WHT